# Patient Record
Sex: MALE | Race: ASIAN | Employment: OTHER | URBAN - METROPOLITAN AREA
[De-identification: names, ages, dates, MRNs, and addresses within clinical notes are randomized per-mention and may not be internally consistent; named-entity substitution may affect disease eponyms.]

---

## 2017-02-08 ENCOUNTER — HOSPITAL ENCOUNTER (OUTPATIENT)
Dept: LAB | Age: 67
Discharge: HOME | End: 2017-02-08
Payer: MEDICARE

## 2017-02-08 ENCOUNTER — OFFICE VISIT (OUTPATIENT)
Dept: INTERNAL MEDICINE CLINIC | Age: 67
End: 2017-02-08

## 2017-02-08 VITALS
WEIGHT: 207 LBS | HEART RATE: 75 BPM | DIASTOLIC BLOOD PRESSURE: 73 MMHG | TEMPERATURE: 98.4 F | OXYGEN SATURATION: 98 % | SYSTOLIC BLOOD PRESSURE: 132 MMHG | BODY MASS INDEX: 30.66 KG/M2 | RESPIRATION RATE: 18 BRPM | HEIGHT: 69 IN

## 2017-02-08 DIAGNOSIS — Z91.09 ENVIRONMENTAL ALLERGIES: ICD-10-CM

## 2017-02-08 DIAGNOSIS — E11.9 CONTROLLED TYPE 2 DIABETES MELLITUS WITHOUT COMPLICATION, WITHOUT LONG-TERM CURRENT USE OF INSULIN (HCC): Primary | ICD-10-CM

## 2017-02-08 DIAGNOSIS — G62.9 NEUROPATHY: ICD-10-CM

## 2017-02-08 DIAGNOSIS — R60.9 EDEMA, UNSPECIFIED TYPE: ICD-10-CM

## 2017-02-08 DIAGNOSIS — E11.9 CONTROLLED TYPE 2 DIABETES MELLITUS WITHOUT COMPLICATION, WITHOUT LONG-TERM CURRENT USE OF INSULIN (HCC): ICD-10-CM

## 2017-02-08 DIAGNOSIS — R23.8 SKIN IRRITATION: ICD-10-CM

## 2017-02-08 DIAGNOSIS — I10 ESSENTIAL HYPERTENSION: ICD-10-CM

## 2017-02-08 LAB
ALBUMIN SERPL BCP-MCNC: 3.9 G/DL (ref 3.4–5)
ALBUMIN/GLOB SERPL: 1.3 {RATIO} (ref 0.8–1.7)
ALP SERPL-CCNC: 87 U/L (ref 45–117)
ALT SERPL-CCNC: 31 U/L (ref 16–61)
ANION GAP BLD CALC-SCNC: 10 MMOL/L (ref 3–18)
AST SERPL W P-5'-P-CCNC: 18 U/L (ref 15–37)
BILIRUB SERPL-MCNC: 0.4 MG/DL (ref 0.2–1)
BUN SERPL-MCNC: 18 MG/DL (ref 7–18)
BUN/CREAT SERPL: 25 (ref 12–20)
CALCIUM SERPL-MCNC: 8.6 MG/DL (ref 8.5–10.1)
CHLORIDE SERPL-SCNC: 105 MMOL/L (ref 100–108)
CHOLEST SERPL-MCNC: 177 MG/DL
CO2 SERPL-SCNC: 26 MMOL/L (ref 21–32)
CREAT SERPL-MCNC: 0.72 MG/DL (ref 0.6–1.3)
EST. AVERAGE GLUCOSE BLD GHB EST-MCNC: 134 MG/DL
GLOBULIN SER CALC-MCNC: 3 G/DL (ref 2–4)
GLUCOSE SERPL-MCNC: 81 MG/DL (ref 74–99)
HBA1C MFR BLD: 6.3 % (ref 4.2–5.6)
HDLC SERPL-MCNC: 53 MG/DL (ref 40–60)
HDLC SERPL: 3.3 {RATIO} (ref 0–5)
LDLC SERPL CALC-MCNC: 92.6 MG/DL (ref 0–100)
LIPID PROFILE,FLP: ABNORMAL
POTASSIUM SERPL-SCNC: 3.6 MMOL/L (ref 3.5–5.5)
PROT SERPL-MCNC: 6.9 G/DL (ref 6.4–8.2)
SODIUM SERPL-SCNC: 141 MMOL/L (ref 136–145)
TRIGL SERPL-MCNC: 157 MG/DL (ref ?–150)
VLDLC SERPL CALC-MCNC: 31.4 MG/DL

## 2017-02-08 PROCEDURE — 80061 LIPID PANEL: CPT | Performed by: PHYSICIAN ASSISTANT

## 2017-02-08 PROCEDURE — 36415 COLL VENOUS BLD VENIPUNCTURE: CPT | Performed by: PHYSICIAN ASSISTANT

## 2017-02-08 PROCEDURE — 80053 COMPREHEN METABOLIC PANEL: CPT | Performed by: PHYSICIAN ASSISTANT

## 2017-02-08 PROCEDURE — 83036 HEMOGLOBIN GLYCOSYLATED A1C: CPT | Performed by: PHYSICIAN ASSISTANT

## 2017-02-08 RX ORDER — MINOXIDIL 50 MG/G
AEROSOL, FOAM TOPICAL
Qty: 3 CAN | Refills: 5 | Status: SHIPPED | OUTPATIENT
Start: 2017-02-08 | End: 2017-07-12

## 2017-02-08 RX ORDER — HYDROCHLOROTHIAZIDE 25 MG/1
25 TABLET ORAL DAILY
Qty: 90 TAB | Refills: 1 | Status: SHIPPED | OUTPATIENT
Start: 2017-02-08 | End: 2017-07-12

## 2017-02-08 RX ORDER — HYDROCORTISONE VALERATE 2 MG/G
CREAM TOPICAL 3 TIMES DAILY
Qty: 45 G | Refills: 2 | Status: SHIPPED | OUTPATIENT
Start: 2017-02-08 | End: 2018-01-17 | Stop reason: SDUPTHER

## 2017-02-08 RX ORDER — GABAPENTIN 100 MG/1
100 CAPSULE ORAL 3 TIMES DAILY
Qty: 270 CAP | Refills: 1 | Status: SHIPPED | OUTPATIENT
Start: 2017-02-08 | End: 2018-01-17 | Stop reason: SDUPTHER

## 2017-02-08 RX ORDER — METFORMIN HYDROCHLORIDE 750 MG/1
750 TABLET, EXTENDED RELEASE ORAL DAILY
Qty: 90 TAB | Refills: 1 | Status: SHIPPED | OUTPATIENT
Start: 2017-02-08 | End: 2017-10-12 | Stop reason: SDUPTHER

## 2017-02-08 RX ORDER — CETIRIZINE HCL 10 MG
10 TABLET ORAL DAILY
Qty: 90 TAB | Refills: 1 | Status: SHIPPED | OUTPATIENT
Start: 2017-02-08 | End: 2017-07-10 | Stop reason: SDUPTHER

## 2017-02-08 RX ORDER — NIFEDIPINE 60 MG/1
60 TABLET, EXTENDED RELEASE ORAL DAILY
Qty: 90 TAB | Refills: 1 | Status: SHIPPED | OUTPATIENT
Start: 2017-02-08 | End: 2017-10-12 | Stop reason: SDUPTHER

## 2017-02-08 NOTE — PATIENT INSTRUCTIONS
Noninsulin Medicines for Type 2 Diabetes: Care Instructions  Your Care Instructions  There are different types of noninsulin medicines for diabetes. Each works in a different way to help you control your blood sugar. Some types help your body make insulin to lower your blood sugar. Others lower how much insulin your body needs. Some can slow how quickly your body digests sugars. And some can remove extra glucose through your urine. Some of these medicines are:  · Alpha-glucosidase inhibitors. These keep starches from breaking down. This means that they lower the amount of glucose absorbed when you eat. They do not help your body make more insulin. So they will not cause low blood sugar unless they are used with other medicines for diabetes. They include acarbose and miglitol. · DPP-4 inhibitors. These help your body increase the level of insulin after eating. They also help your body make less of a hormone that raises blood sugar. They include linagliptin, saxagliptin, and sitagliptin. · Incretin hormones (GLP-1 receptor agonists). Your body makes a protein that can raise your insulin level, lower your blood sugar, and make you less hungry. You can inject hormone medicines that work the same way as this protein. They include exenatide and liraglutide. · Meglitinides. These help your body release insulin. They also help slow how your body digests sugars. In this way, they prevent your blood sugar from rising too fast after you eat. They include nateglinide and repaglinide. · Metformin. This lowers how much glucose your liver makes. And it helps you respond better to insulin. It also lowers the amount of stored sugar that your liver releases when you are not eating. · Sodium glucose co-transporter 2 inhibitors (SGLT2 inhibitors). These help to remove extra glucose through your urine. They may also help some people lose weight. They include canagliflozin, dapagliflozin, and empagliflozin. · Sulfonylureas. These help your body release more insulin. Some work for many hours and can cause low blood sugar if you don't eat as you expected. They include glipizide and glyburide. · Thiazolidinediones. These reduce the amount of blood glucose. They also help you respond better to insulin. They include pioglitazone and rosiglitazone. You may need to take more than one medicine for diabetes. Two or more medicines may work better to lower your blood sugar level than just one medicine. Follow-up care is a key part of your treatment and safety. Be sure to make and go to all appointments, and call your doctor if you are having problems. It's also a good idea to know your test results and keep a list of the medicines you take. How can you care for yourself at home? · Eat a healthy diet. Get some exercise each day. This may help you to reduce how much medicine you need. · Do not take other prescription or over-the-counter medicines, vitamins, herbal products, or supplements without talking to your doctor first. Some medicines for type 2 diabetes can cause problems with other medicines or supplements. · Tell your doctor if you plan to get pregnant. Some of these drugs are not safe for pregnant women. · Be safe with medicines. Take your medicines exactly as prescribed. Meglitinides or sulfonylureas can cause your blood sugar to drop very low. Call your doctor if you think you are having a problem with your medicine. · Check your blood sugar levels often. You can use a glucose monitor. Keeping track can help you know how certain foods, activities, and medicines affect your blood sugar. And it can help you keep your blood sugar from getting so low that it's not safe. When should you call for help? Call 911 anytime you think you may need emergency care. For example, call if:  · You passed out (lost consciousness), or you suddenly become very sleepy or confused.  (You may have very low blood sugar.)  · You have symptoms of high blood sugar, such as:  ¨ Blurred vision. ¨ Trouble staying awake or being woken up. ¨ Fast, deep breathing. ¨ Breath that smells fruity. ¨ Belly pain, not feeling hungry, and vomiting. ¨ Feeling confused. Call your doctor now or seek immediate medical care if:  · You are sick and cannot control your blood sugar. · You have been vomiting or have had diarrhea for more than 6 hours. · Your blood sugar stays higher than the level your doctor has set for you. · You have symptoms of low blood sugar, such as:  ¨ Sweating. ¨ Feeling nervous, shaky, and weak. ¨ Extreme hunger and slight nausea. ¨ Dizziness and headache. ¨ Blurred vision. ¨ Confusion. Watch closely for changes in your health, and be sure to contact your doctor if:  · You have a hard time knowing when your blood sugar is low. · You have trouble keeping your blood sugar in the target range. · You often have problems controlling your blood sugar. · You have symptoms of long-term diabetes problems, such as:  ¨ New vision changes. ¨ New pain, numbness, or tingling in your hands or feet. ¨ Skin problems. Where can you learn more? Go to http://shivani-taylor.info/. Enter H153 in the search box to learn more about \"Noninsulin Medicines for Type 2 Diabetes: Care Instructions. \"  Current as of: August 3, 2016  Content Version: 11.1  © 5511-3489 [employer]. Care instructions adapted under license by [employer] (which disclaims liability or warranty for this information). If you have questions about a medical condition or this instruction, always ask your healthcare professional. [place] [address] any warranty or liability for your use of this information.

## 2017-02-08 NOTE — PROGRESS NOTES
HISTORY OF PRESENT ILLNESS  Gretchen Avila is a 73 y.o. male. HPI Mr. Lee Morales is here for follow up on Diabetes and HTN. He feels well. His weight is up a few pounds. He is requesting a [place] for ro[place] He is noticing hair thinning on the top of his head. Current Outpatient Prescriptions   Medication Sig    gabapentin (NEURONTIN) 100 mg capsule Take 1 Cap by mouth three (3) times daily.  metFORMIN ER (GLUCOPHAGE XR) 750 mg tablet Take 1 Tab by mouth daily.  hydrochlorothiazide (HYDRODIURIL) 25 mg tablet Take 1 Tab by mouth daily. Prn swellling    cetirizine (ZYRTEC) 10 mg tablet Take 1 Tab by mouth daily.  tamsulosin (FLOMAX) 0.4 mg capsule Take 1 Cap by mouth daily.  NIFEdipine ER (ADALAT CC) 60 mg ER tablet Take 1 Tab by mouth daily. No current facility-administered medications for this visit. Review of Systems   Constitutional: Negative. HENT: Negative. Eyes: Negative. Respiratory: Negative. Cardiovascular: Positive for leg swelling (occasional). Negative for chest pain. Gastrointestinal: Negative. Neurological: Positive for tingling (secondary to back problems. Takes neurontin with control of sx. ). Negative for dizziness. Psychiatric/Behavioral: Negative. Physical Exam   Constitutional: He is oriented to person, place, and time. He appears well-developed and well-nourished. No distress. HENT:   Head: Normocephalic and atraumatic. Cardiovascular: Normal rate and regular rhythm. No murmur heard. Pulmonary/Chest: Effort normal and breath sounds normal.   Musculoskeletal: He exhibits no edema. Neurological: He is alert and oriented to person, place, and time. Psychiatric: He has a normal mood and affect.  His behavior is normal. Judgment and thought content normal.     Visit Vitals    /73 (BP 1 Location: Left arm, BP Patient Position: Sitting)    Pulse 75    Temp 98.4 °F (36.9 °C) (Oral)  Resp 18    Ht 5' 9\" (1.753 m)    Wt 207 lb (93.9 kg)    SpO2 98%    BMI 30.57 kg/m2        Wt Readings from Last 3 Encounters:   02/08/17 207 lb (93.9 kg)   09/29/16 204 lb (92.5 kg)   08/24/16 204 lb (92.5 kg)       ASSESSMENT and PLAN    ICD-10-CM ICD-9-CM    1. Controlled type 2 diabetes mellitus without complication, without long-term current use of insulin ([facility]) X77.7 417.91 METABOLIC PANEL, COMPREHENSIVE      LIPID PANEL      HEMOGLOBIN A1C WITH EAG      metFORMIN ER (GLUCOPHAGE XR) 750 mg tablet   2. Environmental allergies Z91.09 V15.09 cetirizine (ZYRTEC) 10 mg tablet   3. Skin irritation R23.8 709.9 hydrocortisone valerate (WESTCORT) 0.2 % topical cream   4. Edema, unspecified type R60.9 782.3 hydroCHLOROthiazide (HYDRODIURIL) 25 mg tablet   5. Neuropathy G62.9 355.9 gabapentin (NEURONTIN) 100 mg capsule   6. Essential hypertension I10 401.9 NIFEdipine ER (ADALAT CC) 60 mg ER tablet      hydroCHLOROthiazide (HYDRODIURIL) 25 mg tablet     Pt verbalized understanding of their condition and diagnoses, treatment plan,  as well as side effects of any new medications prescribed.

## 2017-02-08 NOTE — PROGRESS NOTES
Chief Complaint   Patient presents with    Diabetes    Hypertension     1. Have you been to the ER, urgent care clinic since your last visit? Hospitalized since your last visit? No    2. Have you seen or consulted any other health care providers outside of the [address] [place] [address] since your last visit? Include any pap smears or colon screening.  No

## 2017-02-16 RX ORDER — TAMSULOSIN HYDROCHLORIDE 0.4 MG/1
0.4 CAPSULE ORAL DAILY
Qty: 90 CAP | Refills: 1 | Status: SHIPPED | OUTPATIENT
Start: 2017-02-16 | End: 2017-07-10 | Stop reason: SDUPTHER

## 2017-07-12 ENCOUNTER — LAB ONLY (OUTPATIENT)
Dept: INTERNAL MEDICINE CLINIC | Age: 67
End: 2017-07-12

## 2017-07-12 ENCOUNTER — HOSPITAL ENCOUNTER (OUTPATIENT)
Dept: LAB | Age: 67
Discharge: HOME | End: 2017-07-12
Payer: MEDICARE

## 2017-07-12 ENCOUNTER — OFFICE VISIT (OUTPATIENT)
Dept: INTERNAL MEDICINE CLINIC | Age: 67
End: 2017-07-12

## 2017-07-12 VITALS
DIASTOLIC BLOOD PRESSURE: 66 MMHG | RESPIRATION RATE: 18 BRPM | OXYGEN SATURATION: 95 % | HEIGHT: 69 IN | BODY MASS INDEX: 30.81 KG/M2 | WEIGHT: 208 LBS | HEART RATE: 83 BPM | SYSTOLIC BLOOD PRESSURE: 134 MMHG | TEMPERATURE: 98.2 F

## 2017-07-12 DIAGNOSIS — E11.9 TYPE 2 DIABETES MELLITUS WITHOUT COMPLICATION, WITHOUT LONG-TERM CURRENT USE OF INSULIN (HCC): Primary | ICD-10-CM

## 2017-07-12 DIAGNOSIS — Z23 NEED FOR PNEUMOCOCCAL VACCINE: ICD-10-CM

## 2017-07-12 DIAGNOSIS — Z00.00 MEDICARE ANNUAL WELLNESS VISIT, INITIAL: ICD-10-CM

## 2017-07-12 DIAGNOSIS — M25.50 MULTIPLE JOINT PAIN: ICD-10-CM

## 2017-07-12 DIAGNOSIS — I10 ESSENTIAL HYPERTENSION: ICD-10-CM

## 2017-07-12 DIAGNOSIS — Z12.5 SCREENING FOR MALIGNANT NEOPLASM OF PROSTATE: ICD-10-CM

## 2017-07-12 DIAGNOSIS — Z12.5 SCREENING FOR MALIGNANT NEOPLASM OF PROSTATE: Primary | ICD-10-CM

## 2017-07-12 DIAGNOSIS — R60.9 EDEMA, UNSPECIFIED TYPE: ICD-10-CM

## 2017-07-12 DIAGNOSIS — E11.9 TYPE 2 DIABETES MELLITUS WITHOUT COMPLICATION, WITHOUT LONG-TERM CURRENT USE OF INSULIN (HCC): ICD-10-CM

## 2017-07-12 LAB
ALBUMIN SERPL BCP-MCNC: 3.8 G/DL (ref 3.4–5)
ALBUMIN/GLOB SERPL: 1.2 {RATIO} (ref 0.8–1.7)
ALP SERPL-CCNC: 88 U/L (ref 45–117)
ALT SERPL-CCNC: 44 U/L (ref 16–61)
ANION GAP BLD CALC-SCNC: 9 MMOL/L (ref 3–18)
AST SERPL W P-5'-P-CCNC: 25 U/L (ref 15–37)
BILIRUB SERPL-MCNC: 0.6 MG/DL (ref 0.2–1)
BUN SERPL-MCNC: 16 MG/DL (ref 7–18)
BUN/CREAT SERPL: 21 (ref 12–20)
CALCIUM SERPL-MCNC: 8.4 MG/DL (ref 8.5–10.1)
CHLORIDE SERPL-SCNC: 104 MMOL/L (ref 100–108)
CHOLEST SERPL-MCNC: 200 MG/DL
CO2 SERPL-SCNC: 27 MMOL/L (ref 21–32)
CREAT SERPL-MCNC: 0.77 MG/DL (ref 0.6–1.3)
EST. AVERAGE GLUCOSE BLD GHB EST-MCNC: 143 MG/DL
GLOBULIN SER CALC-MCNC: 3.3 G/DL (ref 2–4)
GLUCOSE SERPL-MCNC: 115 MG/DL (ref 74–99)
HBA1C MFR BLD: 6.6 % (ref 4.2–5.6)
HDLC SERPL-MCNC: 58 MG/DL (ref 40–60)
HDLC SERPL: 3.4 {RATIO} (ref 0–5)
LDLC SERPL CALC-MCNC: 120.4 MG/DL (ref 0–100)
LIPID PROFILE,FLP: ABNORMAL
POTASSIUM SERPL-SCNC: 4.1 MMOL/L (ref 3.5–5.5)
PROT SERPL-MCNC: 7.1 G/DL (ref 6.4–8.2)
SODIUM SERPL-SCNC: 140 MMOL/L (ref 136–145)
TRIGL SERPL-MCNC: 108 MG/DL (ref ?–150)
VLDLC SERPL CALC-MCNC: 21.6 MG/DL

## 2017-07-12 PROCEDURE — 83036 HEMOGLOBIN GLYCOSYLATED A1C: CPT | Performed by: PHYSICIAN ASSISTANT

## 2017-07-12 PROCEDURE — 80053 COMPREHEN METABOLIC PANEL: CPT | Performed by: PHYSICIAN ASSISTANT

## 2017-07-12 PROCEDURE — 84153 ASSAY OF PSA TOTAL: CPT | Performed by: PHYSICIAN ASSISTANT

## 2017-07-12 PROCEDURE — 80061 LIPID PANEL: CPT | Performed by: PHYSICIAN ASSISTANT

## 2017-07-12 RX ORDER — CELECOXIB 200 MG/1
200 CAPSULE ORAL DAILY
Qty: 90 CAP | Refills: 3 | Status: SHIPPED | OUTPATIENT
Start: 2017-07-12 | End: 2018-01-18

## 2017-07-12 RX ORDER — CELECOXIB 200 MG/1
200 CAPSULE ORAL DAILY
COMMUNITY
End: 2017-07-12 | Stop reason: SDUPTHER

## 2017-07-12 RX ORDER — HYDROCHLOROTHIAZIDE 25 MG/1
25 TABLET ORAL DAILY
Qty: 90 TAB | Refills: 1 | Status: SHIPPED | OUTPATIENT
Start: 2017-07-12 | End: 2018-01-18

## 2017-07-12 NOTE — PROGRESS NOTES
Chief Complaint   Patient presents with    Diabetes    Hypertension    Annual Wellness Visit     1. Have you been to the ER, urgent care clinic since your last visit? Hospitalized since your last visit? No    2. Have you seen or consulted any other health care providers outside of the [address] [place] [address] [place] since your last visit? Include any pap smears or colon screening.  No    ADL Assessment 7/12/2017   Feeding yourself No Help Needed   Getting from bed to chair No Help Needed   Getting dressed No Help Needed   Bathing or showering No Help Needed   Walk across the room (includes cane/walker) No Help Needed   Using the telphone No Help Needed   Taking your medications No Help Needed   Preparing meals No Help Needed   Managing money (expenses/bills) No Help Needed   Moderately strenuous housework (laundry) No Help Needed   Shopping for personal items (toiletries/medicines) No Help Needed   Shopping for groceries No Help Needed   Driving No Help Needed   Climbing a flight of stairs No Help Needed   Getting to places beyond walking distances No Help Needed

## 2017-07-12 NOTE — PROGRESS NOTES
HISTORY OF PRESENT ILLNESS  Ming eLmus is a 78 y.o. male. HPI Ming Lemus is here for follow up on diabetes, HTN and hypercholesterolemia. He has no new complaints, although he states his back and knees hurt him. He admits he doesn't check his sugar. He denies any sx of low or high blood sugar. He was in earlier and had labs drawn. Review of Systems   Constitutional: Negative. Eyes: Negative for blurred vision. Respiratory: Negative. Cardiovascular: Positive for leg swelling (has not been taking HCTZ). Neurological: Positive for tingling (neuropathy secondary to back issues). Negative for dizziness and headaches. Psychiatric/Behavioral: Negative. Physical Exam   Constitutional: He is oriented to person, place, and time. He appears well-developed and well-nourished. No distress. Cardiovascular: Normal rate and regular rhythm. No murmur heard. Pulmonary/Chest: Effort normal and breath sounds normal. He has no wheezes. Musculoskeletal: Normal range of motion. He exhibits edema (encouraged elevation of feet, compression stockings, use of HCTZ with proper hydration). Neurological: He is alert and oriented to person, place, and time. Psychiatric: He has a normal mood and affect. His behavior is normal. Judgment and thought content normal.     Visit Vitals    /66 (BP 1 Location: Left arm, BP Patient Position: Sitting)    Pulse 83    Temp 98.2 °F (36.8 °C) (Oral)    Resp 18    Ht 5' 9\" (1.753 m)    Wt 208 lb (94.3 kg)    SpO2 95%    BMI 30.72 kg/m2       Marvin was seen today for diabetes, hypertension and annual wellness visit. Diagnoses and all orders for this visit:    Type 2 diabetes mellitus without complication, without long-term current use of insulin (HCC)    Multiple joint pain  -     celecoxib (CELEBREX) 200 mg capsule; Take 1 Cap by mouth daily. Edema, unspecified type  -     hydroCHLOROthiazide (HYDRODIURIL) 25 mg tablet;  Take 1 Tab by mouth daily. Prn swellling   Some swelling may be due to the calcium channel blocker and neurontin also. Essential hypertension  -     hydroCHLOROthiazide (HYDRODIURIL) 25 mg tablet; Take 1 Tab by mouth daily. Prn swellling    Need for pneumococcal vaccine  -     pneumococcal 23-valent (PNEUMOVAX 23) 25 mcg/0.5 mL injection; 0.5 mL by IntraMUSCular route once for 1 dose. [place] Pt verbalized understanding of their condition and diagnoses, treatment plan,  as well as side effects of any new medications prescribed.

## 2017-07-12 NOTE — PATIENT INSTRUCTIONS
Schedule of Personalized Health Plan  (Provide Copy to Patient)  The best way to stay healthy is to live a healthy lifestyle. A healthy lifestyle includes regular exercise, eating a well-balanced diet, keeping a healthy weight and not smoking. Regular physical exams and screening tests are another important way to take care of yourself. Preventive exams provided by health care providers can find health problems early when treatment works best and can keep you from getting certain diseases or illnesses. Preventive services include exams, lab tests, screenings, shots, monitoring and information to help you take care of your own health. All people over 65 should have a pneumonia shot. Pneumonia shots are usually only needed once in a lifetime unless your doctor decides differently. All people over 65 should have a yearly flu shot. People over 65 are at medium to high risk for Hepatitis B. Three shots are needed for complete protection. In addition to your physical exam, some screening tests are recommended:    Bone mass measurement (dexa scan) is recommended every two years if you have certain risk factors, such as personal history of vertebral fracture or chronic steroid medication use    Diabetes Mellitus screening is recommended every year. Glaucoma is an eye disease caused by high pressure in the eye. An eye exam is recommended every year. Cardiovascular screening tests that check your cholesterol and other blood fat (lipid) levels are recommended every five years. Colorectal Cancer screening tests help to find pre-cancerous polyps (growths in the colon) so they can be removed before they turn into cancer. Tests ordered for screening depend on your personal and family history risk factors.     Screening for Prostate Cancer is recommended yearly with a digital rectal exam and/or a PSA test    Here is a list of your current Health Maintenance items with a due date:  Health Maintenance Topic Date Due    EYE EXAM RETINAL OR DILATED Q1  08/17/2017 (Originally 6/6/1960)    INFLUENZA AGE 9 TO ADULT  08/01/2017    Pneumococcal 65+ Low/Medium Risk (2 of 2 - PPSV23) 08/24/2017    MICROALBUMIN Q1  08/24/2017    HEMOGLOBIN A1C Q6M  01/12/2018    FOOT EXAM Q1  02/08/2018    LIPID PANEL Q1  02/08/2018    GLAUCOMA SCREENING Q2Y  07/12/2018    MEDICARE YEARLY EXAM  07/13/2018    COLONOSCOPY  08/27/2025    DTaP/Tdap/Td series (3 - Td) 08/24/2026    Hepatitis C Screening  Completed    ZOSTER VACCINE AGE 60>  Completed

## 2017-07-12 NOTE — PROGRESS NOTES
Ernie Avalos is a 78 y.o. male and presents for annual Medicare Wellness Visit. Problem List: Reviewed with patient and discussed risk factors. Patient Active Problem List   Diagnosis Code    Advance directive discussed with patient Z70.80    Type 2 diabetes mellitus without complication, without long-term current use of insulin ([facility] [address] E11.9       Current medical providers:  Patient Care Team:  Laurina Alpers, PA-C as PCP - General (Family Practice)  Adriano Navarrete MD as Physician (Colon and Rectal Surgery)    PSH: Reviewed with patient  Past Surgical History:   Procedure Laterality Date    COLONOSCOPY  10/13/2015 Return 10/14/2025    Dr. Simeon Blair; screening    HX KNEE ARTHROSCOPY      right knee        SH: Reviewed with patient  Social History   Substance Use Topics    Smoking status: Never Smoker    Smokeless tobacco: Never Used    Alcohol use 1.0 oz/week     2 Cans of beer per week       FH: Reviewed with patient  Family History   Problem Relation Age of Onset    No Known Problems Mother     No Known Problems Father        Medications/Allergies: Reviewed with patient  Current Outpatient Prescriptions on File Prior to Visit   Medication Sig Dispense Refill    cetirizine (ZYRTEC) 10 mg tablet Take 1 Tab by mouth daily. 90 Tab 1    tamsulosin (FLOMAX) 0.4 mg capsule Take 1 Cap by mouth daily. 90 Cap 1    NIFEdipine ER (ADALAT CC) 60 mg ER tablet Take 1 Tab by mouth daily. 90 Tab 1    hydrocortisone valerate (WESTCORT) 0.2 % topical cream Apply  to affected area three (3) times daily. use thin layer 45 g 2    gabapentin (NEURONTIN) 100 mg capsule Take 1 Cap by mouth three (3) times daily. 270 Cap 1    metFORMIN ER (GLUCOPHAGE XR) 750 mg tablet Take 1 Tab by mouth daily. 90 Tab 1     No current facility-administered medications on file prior to visit.        No Known Allergies    Objective:  Visit Vitals    /66 (BP 1 Location: Left arm, BP Patient Position: Sitting)    Pulse 83    Temp 98.2 °F (36.8 °C) (Oral)    Resp 18    Ht 5' 9\" (1.753 m)    Wt 208 lb (94.3 kg)    SpO2 95%    BMI 30.72 kg/m2    Body mass index is 30.72 kg/(m^2). Assessment of cognitive impairment: Alert and oriented x 3    Depression Screen:   PHQ over the last two weeks 7/12/2017   Little interest or pleasure in doing things Not at all   Feeling down, depressed or hopeless Not at all   Total Score PHQ 2 0       Fall Risk Assessment:    Fall Risk Assessment, last 12 mths 7/12/2017   Able to walk? Yes   Fall in past 12 months? No       Functional Ability:   Does the patient exhibit a steady gait? yes   How long did it take the patient to get up and walk from a sitting position? <10 seconds   Is the patient self reliant?  (ie can do own laundry, meals, household chores)  yes     Does the patient handle his/her own medications? yes     Does the patient handle his/her own money? yes     Is the patients home safe (ie good lighting, handrails on stairs and bath, etc.)? yes     Did you notice or did patient express any hearing difficulties? no     Did you notice or did patient express any vision difficulties?   no     Were distance and reading eye charts used? yes       Advance Care Planning:   Patient was offered the opportunity to discuss advance care planning:  yes     Does patient have an Advance Directive:  no   If no, did you provide information on Caring Connections?   yes       Plan:      Orders Placed This Encounter    DISCONTD: celecoxib (CELEBREX) 200 mg capsule    celecoxib (CELEBREX) 200 mg capsule    hydroCHLOROthiazide (HYDRODIURIL) 25 mg tablet    pneumococcal 23-valent (PNEUMOVAX 23) 25 mcg/0.5 mL injection       Health Maintenance   Topic Date Due    EYE EXAM RETINAL OR DILATED Q1  08/17/2017 (Originally 6/6/1960)    INFLUENZA AGE 9 TO ADULT  08/01/2017    Pneumococcal 65+ Low/Medium Risk (2 of 2 - PPSV23) 08/24/2017    MICROALBUMIN Q1  08/24/2017    HEMOGLOBIN A1C Q6M  01/12/2018    FOOT EXAM Q1  02/08/2018    LIPID PANEL Q1  02/08/2018    GLAUCOMA SCREENING Q2Y  07/12/2018    MEDICARE YEARLY EXAM  07/13/2018    COLONOSCOPY  08/27/2025    DTaP/Tdap/Td series (3 - Td) 08/24/2026    Hepatitis C Screening  Completed    ZOSTER VACCINE AGE 60>  Completed       *Patient verbalized understanding and agreement with the plan. A copy of the After Visit Summary with personalized health plan was given to the patient today.

## 2017-07-13 LAB — PSA SERPL-MCNC: 1 NG/ML (ref 0–4)

## 2017-08-17 ENCOUNTER — OFFICE VISIT (OUTPATIENT)
Dept: INTERNAL MEDICINE CLINIC | Age: 67
End: 2017-08-17

## 2017-08-17 ENCOUNTER — TELEPHONE (OUTPATIENT)
Dept: INTERNAL MEDICINE CLINIC | Age: 67
End: 2017-08-17

## 2017-08-17 VITALS
OXYGEN SATURATION: 95 % | TEMPERATURE: 98.7 F | RESPIRATION RATE: 18 BRPM | SYSTOLIC BLOOD PRESSURE: 127 MMHG | HEIGHT: 69 IN | WEIGHT: 201 LBS | HEART RATE: 72 BPM | BODY MASS INDEX: 29.77 KG/M2 | DIASTOLIC BLOOD PRESSURE: 80 MMHG

## 2017-08-17 DIAGNOSIS — L21.9 SEBORRHEIC DERMATITIS OF SCALP: Primary | ICD-10-CM

## 2017-08-17 DIAGNOSIS — R05.9 COUGH: ICD-10-CM

## 2017-08-17 RX ORDER — ALBUTEROL SULFATE 90 UG/1
2 AEROSOL, METERED RESPIRATORY (INHALATION)
Qty: 3 INHALER | Refills: 2 | Status: SHIPPED | OUTPATIENT
Start: 2017-08-17 | End: 2018-01-17 | Stop reason: SDUPTHER

## 2017-08-17 RX ORDER — HYDROCODONE POLISTIREX AND CHLORPHENIRAMINE POLISTIREX 10; 8 MG/5ML; MG/5ML
1 SUSPENSION, EXTENDED RELEASE ORAL
Qty: 115 ML | Refills: 0 | Status: SHIPPED | OUTPATIENT
Start: 2017-08-17 | End: 2017-09-05

## 2017-08-17 RX ORDER — LEVOFLOXACIN 500 MG/1
500 TABLET, FILM COATED ORAL DAILY
Qty: 10 TAB | Refills: 0 | Status: SHIPPED | OUTPATIENT
Start: 2017-08-17 | End: 2017-08-27

## 2017-08-17 RX ORDER — PREDNISONE 20 MG/1
20 TABLET ORAL 2 TIMES DAILY
Qty: 10 TAB | Refills: 0 | Status: SHIPPED | OUTPATIENT
Start: 2017-08-17 | End: 2017-08-22

## 2017-08-17 RX ORDER — KETOCONAZOLE 20 MG/ML
SHAMPOO TOPICAL
Qty: 120 ML | Refills: 2 | Status: SHIPPED | OUTPATIENT
Start: 2017-08-17 | End: 2018-01-17 | Stop reason: SDUPTHER

## 2017-08-17 NOTE — TELEPHONE ENCOUNTER
His cxr shows possibility of reactive airway disease, bronchitis or viral infection. The medication I gave today, particularly the steroids should help him. He should follow up if he doesn't get better.

## 2017-08-17 NOTE — PATIENT INSTRUCTIONS
Cough: Care Instructions  Your Care Instructions  A cough is your body's response to something that bothers your throat or airways. Many things can cause a cough. You might cough because of a cold or the flu, bronchitis, or asthma. Smoking, postnasal drip, allergies, and stomach acid that backs up into your throat also can cause coughs. A cough is a symptom, not a disease. Most coughs stop when the cause, such as a cold, goes away. You can take a few steps at home to cough less and feel better. Follow-up care is a key part of your treatment and safety. Be sure to make and go to all appointments, and call your doctor if you are having problems. It's also a good idea to know your test results and keep a list of the medicines you take. How can you care for yourself at home? · Drink lots of water and other fluids. This helps thin the mucus and soothes a dry or sore throat. Honey or lemon juice in hot water or tea may ease a dry cough. · Take cough medicine as directed by your doctor. · Prop up your head on pillows to help you breathe and ease a dry cough. · Try cough drops to soothe a dry or sore throat. Cough drops don't stop a cough. Medicine-flavored cough drops are no better than candy-flavored drops or hard candy. · Do not smoke. Avoid secondhand smoke. If you need help quitting, talk to your doctor about stop-smoking programs and medicines. These can increase your chances of quitting for good. When should you call for help? Call 911 anytime you think you may need emergency care. For example, call if:  · You have severe trouble breathing. Call your doctor now or seek immediate medical care if:  · You cough up blood. · You have new or worse trouble breathing. · You have a new or higher fever. · You have a new rash.   Watch closely for changes in your health, and be sure to contact your doctor if:  · You cough more deeply or more often, especially if you notice more mucus or a change in the color of your mucus. · You have new symptoms, such as a sore throat, an earache, or sinus pain. · You do not get better as expected. Where can you learn more? Go to http://shivani-taylor.info/. Enter D279 in the search box to learn more about \"Cough: Care Instructions. \"  Current as of: March 25, 2017  Content Version: 11.3  © 8665-2029 [employer]. Care instructions adapted under license by [employer] (which disclaims liability or warranty for this information). If you have questions about a medical condition or this instruction, always ask your healthcare professional. [address] any warranty or liability for your use of this information.

## 2017-08-17 NOTE — PROGRESS NOTES
HISTORY OF PRESENT ILLNESS  Jayant Marrero is a 78 y.o. male. HPI  Mr. Marilia Butler is here after completing a course of zithromax from an urgent care 5 days ago. He c/o wheezing and cough. He was also given albuterol inhaler and tessalon. He did not feel any improvement with the antibiotics. He denies fever, body aches, headache, nausea or vomiting. Review of Systems   HENT: Positive for congestion. Negative for sore throat. Respiratory: Positive for cough, sputum production and wheezing. Neurological: Negative for dizziness. Physical Exam   Constitutional: He is oriented to person, place, and time. He appears well-developed and well-nourished. No distress. HENT:   Head: Normocephalic and atraumatic. Mouth/Throat: No posterior oropharyngeal erythema. Eyes: Conjunctivae are normal.   Cardiovascular: Normal rate and regular rhythm. Pulmonary/Chest: Effort normal. He has wheezes (few). Neurological: He is alert and oriented to person, place, and time. Visit Vitals    /80 (BP 1 Location: Left arm, BP Patient Position: Sitting)    Pulse 72    Temp 98.7 °F (37.1 °C) (Oral)    Resp 18    Ht 5' 9\" (1.753 m)    Wt 201 lb (91.2 kg)    SpO2 95%    BMI 29.68 kg/m2       ASSESSMENT and PLAN    ICD-10-CM ICD-9-CM    1. Seborrheic dermatitis of scalp L21.9 690.18 predniSONE (DELTASONE) 20 mg tablet   2. Cough R05 786.2 XR CHEST PA LAT      levoFLOXacin (LEVAQUIN) 500 mg tablet      chlorpheniramine-HYDROcodone (TUSSIONEX) 10-8 mg/5 mL suspension      albuterol (PROVENTIL HFA, VENTOLIN HFA, PROAIR HFA) 90 mcg/actuation inhaler     Pt verbalized understanding of their condition and diagnoses, treatment plan,  as well as side effects of any new medications prescribed.

## 2017-08-17 NOTE — PROGRESS NOTES
Chief Complaint   Patient presents with    Cough     Pt has also been to [facility] and was prescribed anx which has not worked. Completed zpack, inhaler and cough meds x 4 days. 1. Have you been to the ER, urgent care clinic since your last visit? Hospitalized since your last visit? Yes Where: [facility]    2. Have you seen or consulted any other health care providers outside of the [address] [place] [address] since your last visit? Include any pap smears or colon screening.  No

## 2017-08-21 NOTE — TELEPHONE ENCOUNTER
Pt aware of below message aware to call and schedule another appt if he is not feeling any better or the cough does not go away.         Closing encounter

## 2017-09-05 ENCOUNTER — OFFICE VISIT (OUTPATIENT)
Dept: INTERNAL MEDICINE CLINIC | Age: 67
End: 2017-09-05

## 2017-09-05 ENCOUNTER — TELEPHONE (OUTPATIENT)
Dept: INTERNAL MEDICINE CLINIC | Age: 67
End: 2017-09-05

## 2017-09-05 VITALS
TEMPERATURE: 97.7 F | HEIGHT: 69 IN | SYSTOLIC BLOOD PRESSURE: 123 MMHG | BODY MASS INDEX: 29.62 KG/M2 | DIASTOLIC BLOOD PRESSURE: 75 MMHG | RESPIRATION RATE: 18 BRPM | WEIGHT: 200 LBS | HEART RATE: 77 BPM | OXYGEN SATURATION: 95 %

## 2017-09-05 DIAGNOSIS — R05.9 COUGH: Primary | ICD-10-CM

## 2017-09-05 DIAGNOSIS — R07.9 CHEST PAIN, UNSPECIFIED TYPE: ICD-10-CM

## 2017-09-05 DIAGNOSIS — J45.909 REACTIVE AIRWAY DISEASE, UNSPECIFIED ASTHMA SEVERITY, UNCOMPLICATED: ICD-10-CM

## 2017-09-05 DIAGNOSIS — R06.09 DOE (DYSPNEA ON EXERTION): ICD-10-CM

## 2017-09-05 DIAGNOSIS — Z23 ENCOUNTER FOR IMMUNIZATION: ICD-10-CM

## 2017-09-05 DIAGNOSIS — J40 BRONCHITIS: ICD-10-CM

## 2017-09-05 RX ORDER — FLUTICASONE PROPIONATE AND SALMETEROL 250; 50 UG/1; UG/1
1 POWDER RESPIRATORY (INHALATION) EVERY 12 HOURS
Qty: 1 INHALER | Refills: 2 | Status: SHIPPED | OUTPATIENT
Start: 2017-09-05 | End: 2018-04-12 | Stop reason: SDUPTHER

## 2017-09-05 RX ORDER — PREDNISONE 20 MG/1
TABLET ORAL
Qty: 10 TAB | Refills: 0 | Status: SHIPPED | OUTPATIENT
Start: 2017-09-05 | End: 2018-01-18

## 2017-09-05 RX ORDER — AMOXICILLIN AND CLAVULANATE POTASSIUM 875; 125 MG/1; MG/1
1 TABLET, FILM COATED ORAL 2 TIMES DAILY
Qty: 20 TAB | Refills: 0 | Status: SHIPPED | OUTPATIENT
Start: 2017-09-05 | End: 2017-09-15

## 2017-09-05 NOTE — MR AVS SNAPSHOT
Visit Information Date & Time Provider Department Dept. Phone Encounter #  
 9/5/2017 11:15 AM Luis Alberto Powell PA-C [facility] [phone] Upcoming Health Maintenance Date Due  
 EYE EXAM RETINAL OR DILATED Q1 2/28/2018* HEMOGLOBIN A1C Q6M 1/12/2018 FOOT EXAM Q1 2/8/2018 GLAUCOMA SCREENING Q2Y 7/12/2018 LIPID PANEL Q1 7/12/2018 MEDICARE YEARLY EXAM 7/13/2018 MICROALBUMIN Q1 9/5/2018 COLONOSCOPY 8/27/2025 DTaP/Tdap/Td series (3 - Td) 8/24/2026 *Topic was postponed. The date shown is not the original due date. Allergies as of 9/5/2017  Review Complete On: 9/5/2017 By: Palak Barker LPN No Known Allergies Current Immunizations  Never Reviewed Name Date Influenza High Dose Vaccine PF  Incomplete Influenza Vaccine (Quad) PF 9/14/2016 Pneumococcal Conjugate (PCV-13) 8/24/2016 Tdap 2/18/2013 Not reviewed this visit You Were Diagnosed With   
  
 Codes Comments Cough    -  Primary ICD-10-CM: K49 ICD-9-CM: 786.2 REARDON (dyspnea on exertion)     ICD-10-CM: R06.09 
ICD-9-CM: 786.09 Encounter for immunization     ICD-10-CM: J26 ICD-9-CM: V03.89 Chest pain, unspecified type     ICD-10-CM: R07.9 ICD-9-CM: 786.50 Bronchitis     ICD-10-CM: J40 ICD-9-CM: 187 Reactive airway disease, unspecified asthma severity, uncomplicated     H-73-ZX: J45.909 ICD-9-CM: 493.90 Vitals BP Pulse Temp Resp Height(growth percentile) Weight(growth percentile) 123/75 (BP 1 Location: Left arm, BP Patient Position: Sitting) 77 97.7 °F (36.5 °C) (Oral) 18 5' 9\" (1.753 m) 200 lb (90.7 kg) SpO2 BMI Smoking Status 95% 29.53 kg/m2 Never Smoker BMI and BSA Data Body Mass Index Body Surface Area  
 29.53 kg/m 2 2.1 m 2 Preferred Pharmacy Pharmacy Name Phone [address], [place] [phone] Your Updated Medication List  
  
   
This list is accurate as of: 9/5/17  1:00 PM.  Always use your most recent med list.  
  
  
  
  
 albuterol 90 mcg/actuation inhaler Commonly known as:  PROVENTIL HFA, VENTOLIN HFA, PROAIR HFA Take 2 Puffs by inhalation every six (6) hours as needed for Wheezing. amoxicillin-clavulanate 875-125 mg per tablet Commonly known as:  AUGMENTIN Take 1 Tab by mouth two (2) times a day for 10 days. celecoxib 200 mg capsule Commonly known as:  CeleBREX Take 1 Cap by mouth daily. cetirizine 10 mg tablet Commonly known as:  ZYRTEC Take 1 Tab by mouth daily. chlorpheniramine-HYDROcodone 10-8 mg/5 mL suspension Commonly known as:  Marijane Shady Take 5 mL by mouth every twelve (12) hours as needed for Cough. Max Daily Amount: 10 mL. fluticasone-salmeterol 250-50 mcg/dose diskus inhaler Commonly known as:  ADVAIR Take 1 Puff by inhalation every twelve (12) hours. gabapentin 100 mg capsule Commonly known as:  NEURONTIN Take 1 Cap by mouth three (3) times daily. hydroCHLOROthiazide 25 mg tablet Commonly known as:  HYDRODIURIL Take 1 Tab by mouth daily. Prn swellling  
  
 hydrocortisone valerate 0.2 % topical cream  
Commonly known as:  [employer] Apply  to affected area three (3) times daily. use thin layer  
  
 ketoconazole 2 % shampoo Commonly known as:  NIZORAL Shampoo daily, lather and let sit for 5 minutes, then rinse  
  
 metFORMIN  mg tablet Commonly known as:  GLUCOPHAGE XR Take 1 Tab by mouth daily. NIFEdipine ER 60 mg ER tablet Commonly known as:  ADALAT CC Take 1 Tab by mouth daily. predniSONE 20 mg tablet Commonly known as:  Fany Look Take 2 tabs by mouth once daily  
  
 tamsulosin 0.4 mg capsule Commonly known as:  FLOMAX Take 1 Cap by mouth daily. Prescriptions Sent to Pharmacy  Refills  
 amoxicillin-clavulanate (AUGMENTIN) 875-125 mg per tablet 0  
 Sig: Take 1 Tab by mouth two (2) times a day for 10 days. Class: Normal  
 Pharmacy: [address], [place] Ph #: [phone] Route: Oral  
 predniSONE (DELTASONE) 20 mg tablet 0 Sig: Take 2 tabs by mouth once daily Class: Normal  
 Pharmacy: [facility] [address] [address], [place] Ph #: [phone]  
 fluticasone-salmeterol (ADVAIR) 250-50 mcg/dose diskus inhaler 2 Sig: Take 1 Puff by inhalation every twelve (12) hours. Class: Normal  
 Pharmacy: [address], [place] Ph #: [phone] Route: Inhalation We Performed the Following ADMIN INFLUENZA VIRUS VAC [ HCPCS] AMB POC EKG ROUTINE W/ 12 LEADS, INTER & REP [48048 CPT(R)] INFLUENZA VIRUS VACCINE, HIGH DOSE SEASONAL, PRESERVATIVE FREE [31077 CPT(R)] To-Do List   
 09/05/2017 ECHO:  ECHO TTE STRESS EXERCISE TREADMILL COMP   
  
 09/05/2017 ECHO:  ECHO TTE STRESS EXRCSE COMP W OR WO CONTR   
  
 09/05/2017 Imaging:  XR CHEST PA LAT Patient Instructions Influenza Virus Vaccine (By injection) Influenza Virus Vaccine (in-floo-EN-za VYE-bushra VAX-een) Helps prevent infection with influenza (flu) virus. Brand Name(s): Afluria 4772-2801 Formula, Valerie Paige 7732-0713 Formula, FluLaval Quadrivalent 1234-7088 Formula, Fluad 5676-2385 Formula, Fluarix Quadrivalent 5238-5698 Formula, Flublok 9173-1804 Formula, Flucelvax Quadrivalent 9789-4452 Formula, Fluvirin 0405-8426 Formula, Fluzone High-Dose 0432-8587 Formula, Fluzone Intradermal Quadrivalent 0794-3240 Formula, Fluzone Quadrivalent 6625-8460 Formula, Medical Provider Single Use EZ Flu Shot 8692-7903, Medical Provider Single Use EZ Flu Shot 2442-3253 There may be other brand names for this medicine. When This Medicine Should Not Be Used: This vaccine is not right for everyone. You should not receive it if you had an allergic reaction to flu vaccine. If you are allergic to eggs, tell the caregiver who is going to give you the injection. Some brands of this vaccine contain egg proteins and could cause an allergic reaction. How to Use This Medicine:  
Injectable · The vaccine is given as a shot into a muscle or into your skin, usually in the shoulder area. The shot could be given in the thigh for babies and young children. · A nurse or other health provider will give you this medicine. · Read and follow the patient instructions that come with this medicine. Talk to your doctor or pharmacist if you have any questions. · A child who is younger than 5years old and who has not had a flu shot before may need 2 shots. The second shot should be given about 1 month after the first. 
· Missed dose: Most people need only 1 dose of the vaccine. If your child needs a second dose, it is important for the vaccine to be given on schedule. If you must cancel an appointment, make a new one right away. Drugs and Foods to Avoid: Ask your doctor or pharmacist before using any other medicine, including over-the-counter medicines, vitamins, and herbal products. · Tell your doctor if you are using a medicine or treatment that weakens your immune system, such as a steroid, radiation, or cancer treatment. This vaccine may not work as well if you are also using these medicines. However, your doctor may still want you to get the vaccine because it can give you some protection. Warnings While Using This Medicine: · Tell your doctor if you are pregnant or breastfeeding or if you have a weak immune system. · Tell your doctor if you ever had an unusual reaction to a flu shot, such as Guillain-Barré syndrome, or if you are allergic to latex. · The flu vaccine may not protect everyone who receives it.  This vaccine will not treat flu symptoms if you have already been infected with the virus. Possible Side Effects While Using This Medicine:  
Call your doctor right away if you notice any of these side effects: · Allergic reaction: Itching or hives, swelling in your face or hands, swelling or tingling in your mouth or throat, chest tightness, trouble breathing · Fainting, dizziness, or lightheadedness · Fever over 103 degrees F 
· Seizures · Severe muscle weakness If you notice these less serious side effects, talk with your doctor:  
· Headache, muscle pain, tiredness · Irritability or crying (in a child) · Redness, pain, swelling, soreness, or a lump where the shot was given If you notice other side effects that you think are caused by this medicine, tell your doctor. Call your doctor for medical advice about side effects. You may report side effects to FDA at 2-606-FDA-5857 © 2017 [facility] Information is for End User's use only and may not be sold, redistributed or otherwise used for commercial purposes. The above information is an  only. It is not intended as medical advice for individual conditions or treatments. Talk to your doctor, nurse or pharmacist before following any medical regimen to see if it is safe and effective for you. Introducing [facility]! Dear Talon Engle: Thank you for requesting a [employer] account. Our records indicate that you already have an active [employer] account. You can access your account anytime at [email or web address] Did you know that you can access your hospital and ER discharge instructions at any time in [employer]? You can also review all of your test results from your hospital stay or ER visit. Additional Information If you have questions, please visit the Frequently Asked Questions section of the [employer] website at [email or web address] Remember, MyChart is NOT to be used for urgent needs. For medical emergencies, dial 911. Now available from your iPhone and Android! Please provide this summary of care documentation to your next provider. Your primary care clinician is listed as Clari Walter. If you have any questions after today's visit, please call [phone].

## 2017-09-05 NOTE — PATIENT INSTRUCTIONS
Influenza Virus Vaccine (By injection)   Influenza Virus Vaccine (in-floo-EN-za VYE-bushra VAX-een)  Helps prevent infection with influenza (flu) virus. Brand Name(s): Afluria 7660-1560 Formula, Elvin Toribio 8856-3172 Formula, FluLaval Quadrivalent 7305-2453 Formula, Fluad 3712-5769 Formula, Fluarix Quadrivalent 0915-9881 Formula, Flublok 0934-8270 Formula, Flucelvax Quadrivalent 7616-5633 Formula, Fluvirin 2135-0774 Formula, Fluzone High-Dose 7141-1727 Formula, Fluzone Intradermal Quadrivalent 3198-4471 Formula, Fluzone Quadrivalent 8900-0076 Formula, Medical Provider Single Use EZ Flu Shot 8762-1025, Medical Provider Single Use EZ Flu Shot 7394-3578   There may be other brand names for this medicine. When This Medicine Should Not Be Used: This vaccine is not right for everyone. You should not receive it if you had an allergic reaction to flu vaccine. If you are allergic to eggs, tell the caregiver who is going to give you the injection. Some brands of this vaccine contain egg proteins and could cause an allergic reaction. How to Use This Medicine:   Injectable  · The vaccine is given as a shot into a muscle or into your skin, usually in the shoulder area. The shot could be given in the thigh for babies and young children. · A nurse or other health provider will give you this medicine. · Read and follow the patient instructions that come with this medicine. Talk to your doctor or pharmacist if you have any questions. · A child who is younger than 5years old and who has not had a flu shot before may need 2 shots. The second shot should be given about 1 month after the first.  · Missed dose: Most people need only 1 dose of the vaccine. If your child needs a second dose, it is important for the vaccine to be given on schedule. If you must cancel an appointment, make a new one right away.   Drugs and Foods to Avoid:   Ask your doctor or pharmacist before using any other medicine, including over-the-counter medicines, vitamins, and herbal products. · Tell your doctor if you are using a medicine or treatment that weakens your immune system, such as a steroid, radiation, or cancer treatment. This vaccine may not work as well if you are also using these medicines. However, your doctor may still want you to get the vaccine because it can give you some protection. Warnings While Using This Medicine:   · Tell your doctor if you are pregnant or breastfeeding or if you have a weak immune system. · Tell your doctor if you ever had an unusual reaction to a flu shot, such as Guillain-Barré syndrome, or if you are allergic to latex. · The flu vaccine may not protect everyone who receives it. This vaccine will not treat flu symptoms if you have already been infected with the virus. Possible Side Effects While Using This Medicine:   Call your doctor right away if you notice any of these side effects:  · Allergic reaction: Itching or hives, swelling in your face or hands, swelling or tingling in your mouth or throat, chest tightness, trouble breathing  · Fainting, dizziness, or lightheadedness  · Fever over 103 degrees F  · Seizures  · Severe muscle weakness  If you notice these less serious side effects, talk with your doctor:   · Headache, muscle pain, tiredness  · Irritability or crying (in a child)  · Redness, pain, swelling, soreness, or a lump where the shot was given  If you notice other side effects that you think are caused by this medicine, tell your doctor. Call your doctor for medical advice about side effects. You may report side effects to FDA at 3-603-FDA-1545  © 2017 [employer] [employer] Information is for End User's use only and may not be sold, redistributed or otherwise used for commercial purposes. The above information is an  only. It is not intended as medical advice for individual conditions or treatments.  Talk to your doctor, nurse or pharmacist before following any medical regimen to see if it is safe and effective for you.

## 2017-09-05 NOTE — PROGRESS NOTES
HISTORY OF PRESENT ILLNESS  Ursula Mendez is a 78 y.o. male. HPI   Pt is here for cough, SOB, and wheezing. Pt was seen last month for similar symptoms and was given levaquin and prednisone. Pt states that when he lays down his chest feels heavy and he hurts. He feels SOB nicole w exertion. Denies fever, chills, ST, edema, ST, otalgia, dizziness, N/V/D, palpitations, and  HA. Pt also would like his flu shot today. No Known Allergies    Current Outpatient Prescriptions   Medication Sig    amoxicillin-clavulanate (AUGMENTIN) 875-125 mg per tablet Take 1 Tab by mouth two (2) times a day for 10 days.  predniSONE (DELTASONE) 20 mg tablet Take 2 tabs by mouth once daily    fluticasone-salmeterol (ADVAIR) 250-50 mcg/dose diskus inhaler Take 1 Puff by inhalation every twelve (12) hours.  ketoconazole (NIZORAL) 2 % shampoo Shampoo daily, lather and let sit for 5 minutes, then rinse    albuterol (PROVENTIL HFA, VENTOLIN HFA, PROAIR HFA) 90 mcg/actuation inhaler Take 2 Puffs by inhalation every six (6) hours as needed for Wheezing.  cetirizine (ZYRTEC) 10 mg tablet Take 1 Tab by mouth daily.  tamsulosin (FLOMAX) 0.4 mg capsule Take 1 Cap by mouth daily.  celecoxib (CELEBREX) 200 mg capsule Take 1 Cap by mouth daily.  hydroCHLOROthiazide (HYDRODIURIL) 25 mg tablet Take 1 Tab by mouth daily. Prn swellling    NIFEdipine ER (ADALAT CC) 60 mg ER tablet Take 1 Tab by mouth daily.  hydrocortisone valerate (WESTCORT) 0.2 % topical cream Apply  to affected area three (3) times daily. use thin layer    gabapentin (NEURONTIN) 100 mg capsule Take 1 Cap by mouth three (3) times daily.  metFORMIN ER (GLUCOPHAGE XR) 750 mg tablet Take 1 Tab by mouth daily. No current facility-administered medications for this visit. Review of Systems   Constitutional: Positive for malaise/fatigue. Negative for chills and fever. HENT: Negative.   Negative for congestion, ear pain, sinus pain, sore throat and tinnitus. Eyes: Negative. Negative for blurred vision, double vision and photophobia. Respiratory: Positive for cough, sputum production, shortness of breath and wheezing. Negative for hemoptysis. Cardiovascular: Positive for chest pain (when lying down at night). Negative for palpitations and leg swelling. Gastrointestinal: Negative. Negative for abdominal pain, heartburn, nausea and vomiting. Genitourinary: Negative. Negative for dysuria, frequency, hematuria and urgency. Musculoskeletal: Negative. Negative for back pain, joint pain, myalgias and neck pain. Skin: Negative. Negative for itching and rash. Neurological: Negative. Negative for dizziness, tingling, tremors and headaches. Psychiatric/Behavioral: Negative. Negative for depression and memory loss. The patient is not nervous/anxious and does not have insomnia. Visit Vitals    /75 (BP 1 Location: Left arm, BP Patient Position: Sitting)    Pulse 77    Temp 97.7 °F (36.5 °C) (Oral)    Resp 18    Ht 5' 9\" (1.753 m)    Wt 200 lb (90.7 kg)    SpO2 95%    BMI 29.53 kg/m2       Physical Exam   Constitutional: He is oriented to person, place, and time. He appears well-developed and well-nourished. No distress. HENT:   Head: Normocephalic and atraumatic. Right Ear: Tympanic membrane, external ear and ear canal normal.   Left Ear: Tympanic membrane, external ear and ear canal normal.   Nose: Mucosal edema present. No rhinorrhea. Right sinus exhibits no maxillary sinus tenderness and no frontal sinus tenderness. Left sinus exhibits no maxillary sinus tenderness and no frontal sinus tenderness. Mouth/Throat: Uvula is midline, oropharynx is clear and moist and mucous membranes are normal.   Cardiovascular: Normal rate, regular rhythm and normal heart sounds. Pulmonary/Chest: Effort normal. No respiratory distress. He has wheezes. He has no rales. Neurological: He is alert and oriented to person, place, and time. Skin: Skin is warm and dry. He is not diaphoretic. Psychiatric: He has a normal mood and affect. His behavior is normal.       ASSESSMENT and PLAN    ICD-10-CM ICD-9-CM    1. Cough R05 786.2 AMB POC EKG ROUTINE W/ 12 LEADS, INTER & REP      XR CHEST PA LAT      amoxicillin-clavulanate (AUGMENTIN) 875-125 mg per tablet      predniSONE (DELTASONE) 20 mg tablet   2. REARDON (dyspnea on exertion) R06.09 786.09 AMB POC EKG ROUTINE W/ 12 LEADS, INTER & REP      XR CHEST PA LAT      ECHO TTE STRESS EXERCISE TREADMILL COMP      ECHO TTE STRESS EXRCSE COMP W OR WO CONTR      amoxicillin-clavulanate (AUGMENTIN) 875-125 mg per tablet      predniSONE (DELTASONE) 20 mg tablet   3. Encounter for immunization Z23 V03.89 ADMIN INFLUENZA VIRUS VAC      INFLUENZA VIRUS VACCINE, HIGH DOSE SEASONAL, PRESERVATIVE FREE   4. Chest pain, unspecified type R07.9 786.50 ECHO TTE STRESS EXERCISE TREADMILL COMP      ECHO TTE STRESS EXRCSE COMP W OR WO CONTR   5. Bronchitis J40 490 amoxicillin-clavulanate (AUGMENTIN) 875-125 mg per tablet      predniSONE (DELTASONE) 20 mg tablet   6. Reactive airway disease, unspecified asthma severity, uncomplicated H08.963 087.15 fluticasone-salmeterol (ADVAIR) 250-50 mcg/dose diskus inhaler     Follow-up Disposition:  Return if symptoms worsen or fail to improve. Pt expressed understanding of visit summary and plans for any follow ups or referrals as well as any medications prescribed.

## 2017-09-05 NOTE — PROGRESS NOTES
Yulisa Cantu presents today at the clinic for      Chief Complaint   Patient presents with    Cough    Shortness of Breath        Wt Readings from Last 3 Encounters:   09/05/17 200 lb (90.7 kg)   08/17/17 201 lb (91.2 kg)   07/12/17 208 lb (94.3 kg)     Temp Readings from Last 3 Encounters:   09/05/17 97.7 °F (36.5 °C) (Oral)   08/17/17 98.7 °F (37.1 °C) (Oral)   07/12/17 98.2 °F (36.8 °C) (Oral)     BP Readings from Last 3 Encounters:   09/05/17 123/75   08/17/17 127/80   07/12/17 134/66     Pulse Readings from Last 3 Encounters:   09/05/17 77   08/17/17 72   07/12/17 83       There are no preventive care reminders to display for this patient. Coordination of Care:    1. Have you been to the ER, urgent care clinic since your last visit? Hospitalized since your last visit? No    2. Have you seen or consulted any other health care providers outside of the [address] [place] [address] since your last visit? Include any pap smears or colon screening.  No

## 2017-09-07 ENCOUNTER — HOSPITAL ENCOUNTER (OUTPATIENT)
Dept: LAB | Age: 67
Discharge: HOME | End: 2017-09-07
Payer: MEDICARE

## 2017-09-07 LAB
AMPHET UR QL SCN: NEGATIVE
BARBITURATES UR QL SCN: NEGATIVE
BENZODIAZ UR QL: NEGATIVE
CANNABINOIDS UR QL SCN: NEGATIVE
COCAINE UR QL SCN: NEGATIVE
HDSCOM,HDSCOM: NORMAL
METHADONE UR QL: NEGATIVE
OPIATES UR QL: NEGATIVE
PCP UR QL: NEGATIVE

## 2017-09-07 PROCEDURE — 36415 COLL VENOUS BLD VENIPUNCTURE: CPT

## 2017-09-07 PROCEDURE — 80307 DRUG TEST PRSMV CHEM ANLYZR: CPT

## 2017-09-19 DIAGNOSIS — R06.09 DOE (DYSPNEA ON EXERTION): ICD-10-CM

## 2017-09-19 DIAGNOSIS — R07.9 CHEST PAIN, UNSPECIFIED TYPE: ICD-10-CM

## 2017-09-20 ENCOUNTER — TELEPHONE (OUTPATIENT)
Dept: INTERNAL MEDICINE CLINIC | Age: 67
End: 2017-09-20

## 2017-09-20 NOTE — TELEPHONE ENCOUNTER
The itinerary for MrRae Patricia from the hospital states to not take any medications the day of his appt but to bring them all with him

## 2017-09-20 NOTE — TELEPHONE ENCOUNTER
Mr Zo Howe called to advise that he is having a stress test tomorrow, 9/21.   He wants to know if he needs to stop taking any of his medication prior to this test?

## 2017-10-02 ENCOUNTER — TELEPHONE (OUTPATIENT)
Dept: INTERNAL MEDICINE CLINIC | Age: 67
End: 2017-10-02

## 2017-10-12 DIAGNOSIS — E11.9 CONTROLLED TYPE 2 DIABETES MELLITUS WITHOUT COMPLICATION, WITHOUT LONG-TERM CURRENT USE OF INSULIN (HCC): ICD-10-CM

## 2017-10-12 DIAGNOSIS — I10 ESSENTIAL HYPERTENSION: ICD-10-CM

## 2017-10-13 RX ORDER — NIFEDIPINE 60 MG/1
60 TABLET, EXTENDED RELEASE ORAL DAILY
Qty: 90 TAB | Refills: 0 | Status: SHIPPED | OUTPATIENT
Start: 2017-10-13 | End: 2018-01-18 | Stop reason: ALTCHOICE

## 2017-10-13 RX ORDER — METFORMIN HYDROCHLORIDE 750 MG/1
750 TABLET, EXTENDED RELEASE ORAL DAILY
Qty: 90 TAB | Refills: 0 | Status: SHIPPED | OUTPATIENT
Start: 2017-10-13 | End: 2018-01-17 | Stop reason: SDUPTHER

## 2018-01-17 DIAGNOSIS — G62.9 NEUROPATHY: ICD-10-CM

## 2018-01-17 DIAGNOSIS — E11.9 CONTROLLED TYPE 2 DIABETES MELLITUS WITHOUT COMPLICATION, WITHOUT LONG-TERM CURRENT USE OF INSULIN (HCC): ICD-10-CM

## 2018-01-17 DIAGNOSIS — R05.9 COUGH: ICD-10-CM

## 2018-01-17 DIAGNOSIS — E78.00 HYPERCHOLESTEREMIA: Primary | ICD-10-CM

## 2018-01-17 DIAGNOSIS — R35.1 NOCTURIA: ICD-10-CM

## 2018-01-17 DIAGNOSIS — R23.8 SKIN IRRITATION: ICD-10-CM

## 2018-01-17 DIAGNOSIS — L21.0 DANDRUFF: ICD-10-CM

## 2018-01-17 DIAGNOSIS — I10 ESSENTIAL HYPERTENSION: ICD-10-CM

## 2018-01-18 ENCOUNTER — OFFICE VISIT (OUTPATIENT)
Dept: INTERNAL MEDICINE CLINIC | Age: 68
End: 2018-01-18

## 2018-01-18 VITALS
TEMPERATURE: 98.2 F | HEIGHT: 69 IN | BODY MASS INDEX: 29.77 KG/M2 | HEART RATE: 73 BPM | RESPIRATION RATE: 18 BRPM | WEIGHT: 201 LBS | OXYGEN SATURATION: 95 % | DIASTOLIC BLOOD PRESSURE: 82 MMHG | SYSTOLIC BLOOD PRESSURE: 136 MMHG

## 2018-01-18 DIAGNOSIS — E78.5 HYPERLIPIDEMIA, UNSPECIFIED HYPERLIPIDEMIA TYPE: ICD-10-CM

## 2018-01-18 DIAGNOSIS — R05.9 COUGH: ICD-10-CM

## 2018-01-18 DIAGNOSIS — L21.0 DANDRUFF: ICD-10-CM

## 2018-01-18 DIAGNOSIS — E11.9 TYPE 2 DIABETES MELLITUS WITHOUT COMPLICATION, WITHOUT LONG-TERM CURRENT USE OF INSULIN (HCC): Primary | ICD-10-CM

## 2018-01-18 DIAGNOSIS — I10 HYPERTENSION, UNSPECIFIED TYPE: ICD-10-CM

## 2018-01-18 RX ORDER — ALBUTEROL SULFATE 90 UG/1
2 AEROSOL, METERED RESPIRATORY (INHALATION)
Qty: 3 INHALER | Refills: 2 | Status: SHIPPED | OUTPATIENT
Start: 2018-01-18 | End: 2019-01-20 | Stop reason: SDUPTHER

## 2018-01-18 RX ORDER — GABAPENTIN 100 MG/1
100 CAPSULE ORAL 3 TIMES DAILY
Qty: 270 CAP | Refills: 1 | Status: SHIPPED | OUTPATIENT
Start: 2018-01-18 | End: 2018-04-12

## 2018-01-18 RX ORDER — FLUTICASONE PROPIONATE AND SALMETEROL 100; 50 UG/1; UG/1
1 POWDER RESPIRATORY (INHALATION) EVERY 12 HOURS
Qty: 3 INHALER | Refills: 3 | Status: SHIPPED | OUTPATIENT
Start: 2018-01-18 | End: 2019-06-25 | Stop reason: SDUPTHER

## 2018-01-18 RX ORDER — LOSARTAN POTASSIUM 100 MG/1
100 TABLET ORAL DAILY
Qty: 90 TAB | Refills: 1 | Status: SHIPPED | OUTPATIENT
Start: 2018-01-18 | End: 2018-02-21 | Stop reason: ALTCHOICE

## 2018-01-18 RX ORDER — METFORMIN HYDROCHLORIDE 750 MG/1
750 TABLET, EXTENDED RELEASE ORAL DAILY
Qty: 90 TAB | Refills: 0 | Status: SHIPPED | OUTPATIENT
Start: 2018-01-18 | End: 2018-04-12 | Stop reason: ALTCHOICE

## 2018-01-18 RX ORDER — ATORVASTATIN CALCIUM 10 MG/1
10 TABLET, FILM COATED ORAL DAILY
Qty: 90 TAB | Refills: 1 | Status: SHIPPED | OUTPATIENT
Start: 2018-01-18 | End: 2018-07-13 | Stop reason: SDUPTHER

## 2018-01-18 RX ORDER — NIFEDIPINE 60 MG/1
60 TABLET, EXTENDED RELEASE ORAL DAILY
Qty: 90 TAB | Refills: 0 | Status: SHIPPED | OUTPATIENT
Start: 2018-01-18 | End: 2018-02-21

## 2018-01-18 RX ORDER — TAMSULOSIN HYDROCHLORIDE 0.4 MG/1
0.4 CAPSULE ORAL DAILY
Qty: 90 CAP | Refills: 1 | Status: SHIPPED | OUTPATIENT
Start: 2018-01-18 | End: 2018-06-29 | Stop reason: SDUPTHER

## 2018-01-18 RX ORDER — HYDROCORTISONE VALERATE 2 MG/G
CREAM TOPICAL 3 TIMES DAILY
Qty: 45 G | Refills: 5 | Status: SHIPPED | OUTPATIENT
Start: 2018-01-18 | End: 2018-04-12

## 2018-01-18 RX ORDER — KETOCONAZOLE 20 MG/ML
SHAMPOO TOPICAL
Qty: 3 BOTTLE | Refills: 11 | Status: SHIPPED | OUTPATIENT
Start: 2018-01-18 | End: 2018-06-05 | Stop reason: SDUPTHER

## 2018-01-18 NOTE — PROGRESS NOTES
Chief Complaint   Patient presents with    Diabetes    Hypertension     1. Have you been to the ER, urgent care clinic since your last visit? Hospitalized since your last visit? No    2. Have you seen or consulted any other health care providers outside of the [address] [place] [address] since your last visit? Include any pap smears or colon screening.  No

## 2018-01-18 NOTE — PROGRESS NOTES
HISTORY OF PRESENT ILLNESS  Toni Bagley is a 78 y.o. male. HPI Toni Bagley is here for follow up on diabetes, HTN and hypercholesterolemia. He also has started having a slight cough again. He had a bout with a persistent cough in the fall. He had 2 CXRs and states his sx finally improved with antibiotic treatment. He is not using his advair, but is taking a daily allergy medication (zyrtec). He has been to ortho for his back and has received injections. He states they placed him on voltaren and possibly norco. He was dc'd off Celebrex. He is requesting a refill on gabapentin. He is ok with the 100mg dose. I reviewed with him that it would be beneficial to be on an ACE or ARB. His wife believes he had been on an ACE before but was stopped b/c of cough. She said this was years ago. It was then he was put on Adalat. I suggested for the benefit of renal protection, will switch him to Cozaar. Also, advised that recommendations are to also be on a statin with the diabetes diagnoses. His LDL was mildly elevated also. Review of Systems   Constitutional: Negative. HENT: Positive for congestion (c/o occasional sinus congestion. Suggested he try a nasal spray such as flonase. His wife said they have some at home that he can try. ). Eyes: Negative for blurred vision. Respiratory: Positive for cough. Negative for shortness of breath and wheezing. Cardiovascular: Negative. Gastrointestinal: Negative. Musculoskeletal: Positive for back pain and joint pain. Skin: Positive for itching (requesting refill on westcort cream - itching of feet). Neurological: Negative for dizziness and headaches. Psychiatric/Behavioral: Negative. Physical Exam   Constitutional: He is oriented to person, place, and time. He appears well-developed and well-nourished. No distress. HENT:   Head: Normocephalic and atraumatic. Cardiovascular: Normal rate and regular rhythm.     No murmur heard.  Pulmonary/Chest: Effort normal and breath sounds normal. He has no wheezes. Musculoskeletal: He exhibits no edema. Neurological: He is alert and oriented to person, place, and time. Psychiatric: He has a normal mood and affect. His behavior is normal. Judgment and thought content normal.     Visit Vitals    /82 (BP 1 Location: Left arm, BP Patient Position: Sitting)    Pulse 73    Temp 98.2 °F (36.8 °C) (Oral)    Resp 18    Ht 5' 9\" (1.753 m)    Wt 201 lb (91.2 kg)    SpO2 95%    BMI 29.68 kg/m2       ASSESSMENT and PLAN    ICD-10-CM ICD-9-CM    1. Type 2 diabetes mellitus without complication, without long-term current use of insulin (HCC) E11.9 250.00 SC COLLECTION VENOUS BLOOD,VENIPUNCTURE      LIPID PANEL      HEMOGLOBIN A1C WITH EAG      METABOLIC PANEL, COMPREHENSIVE   2. Cough R05 786.2 meds refilled below:   3. Hypertension, unspecified type I10 401.9    4. Hyperlipidemia, unspecified hyperlipidemia type E78.5 272.4    5. Dandruff L21.0 690.18      Current Outpatient Prescriptions   Medication Sig    fluticasone-salmeterol (ADVAIR) 250-50 mcg/dose diskus inhaler Take 1 Puff by inhalation every twelve (12) hours.  cetirizine (ZYRTEC) 10 mg tablet Take 1 Tab by mouth daily.  hydrocortisone valerate (WESTCORT) 0.2 % topical cream Apply  to affected area three (3) times daily. use thin layer    gabapentin (NEURONTIN) 100 mg capsule Take 1 Cap by mouth three (3) times daily.  tamsulosin (FLOMAX) 0.4 mg capsule Take 1 Cap by mouth daily.  ketoconazole (NIZORAL) 2 % shampoo Shampoo daily, lather and let sit for 5 minutes, then rinse  Indications: Dandruff    albuterol (PROVENTIL HFA, VENTOLIN HFA, PROAIR HFA) 90 mcg/actuation inhaler Take 2 Puffs by inhalation every six (6) hours as needed for Wheezing.  NIFEdipine ER (ADALAT CC) 60 mg ER tablet Take 1 Tab by mouth daily.  metFORMIN ER (GLUCOPHAGE XR) 750 mg tablet Take 1 Tab by mouth daily.     losartan (COZAAR) 100 mg tablet Take 1 Tab by mouth daily.  atorvastatin (LIPITOR) 10 mg tablet Take 1 Tab by mouth daily. Pt verbalized understanding of their condition and diagnoses, treatment plan,  as well as side effects of any new medications prescribed.

## 2018-01-18 NOTE — PATIENT INSTRUCTIONS
Noninsulin Medicines for Type 2 Diabetes: Care Instructions  Your Care Instructions    There are different types of noninsulin medicines for diabetes. Each works in a different way. But they all help you control your blood sugar. Some types help your body make insulin to lower your blood sugar. Others lower how much insulin your body needs. Some can slow how fast your body digests sugars. And some can remove extra glucose through your urine. · Alpha-glucosidase inhibitors. These keep starches from breaking down. This means that they lower the amount of glucose absorbed when you eat. They don't help your body make more insulin. So they will not cause low blood sugar unless you use them with other medicines for diabetes. They include acarbose and miglitol. · DPP-4 inhibitors. These help your body raise the level of insulin after you eat. They also help your body make less of a hormone that raises blood sugar. They include linagliptin, saxagliptin, and sitagliptin. · Incretin hormones (GLP-1 receptor agonists). Your body makes a protein that can raise your insulin level. It also can lower your blood sugar and make you less hungry. You can get shots of hormones that work the same way. They include exenatide and liraglutide. · Meglitinides. These help your body release insulin. They also help slow how your body digests sugars. So they can keep your blood sugar from rising too fast after you eat. They include nateglinide and repaglinide. · Metformin. This lowers how much glucose your liver makes. And it helps you respond better to insulin. It also lowers the amount of stored sugar that your liver releases when you are not eating. · SGLT2 inhibitors. These help to remove extra glucose through your urine. They may also help some people lose weight. They include canagliflozin, dapagliflozin, and empagliflozin. · Sulfonylureas. These help your body release more insulin. Some work for many hours.  They can cause low blood sugar if you don't eat as you planned. They include glipizide and glyburide. · Thiazolidinediones. These reduce the amount of blood glucose. They also help you respond better to insulin. They include pioglitazone and rosiglitazone. You may need to take more than one medicine for diabetes. Two or more medicines may work better to lower your blood sugar level than just one does. Follow-up care is a key part of your treatment and safety. Be sure to make and go to all appointments, and call your doctor if you are having problems. It's also a good idea to know your test results and keep a list of the medicines you take. How can you care for yourself at home? · Eat a healthy diet. Get some exercise each day. This may help you to reduce how much medicine you need. · Do not take other prescription or over-the-counter medicines, vitamins, herbal products, or supplements without talking to your doctor first. Some medicines for type 2 diabetes can cause problems with other medicines or supplements. · Tell your doctor if you plan to get pregnant. Some of these drugs are not safe for pregnant women. · Be safe with medicines. Take your medicines exactly as prescribed. Meglitinides and sulfonylureas can cause your blood sugar to drop very low. Call your doctor if you think you are having a problem with your medicine. · Check your blood sugar often. You can use a glucose monitor. Keeping track can help you know how certain foods, activities, and medicines affect your blood sugar. And it can help you keep your blood sugar from getting so low that it's not safe. When should you call for help? Call 911 anytime you think you may need emergency care. For example, call if:  ? · You passed out (lost consciousness). ? · You are confused or cannot think clearly. ? · Your blood sugar is very high or very low. ? Watch closely for changes in your health, and be sure to contact your doctor if:  ? · Your blood sugar stays outside the level your doctor set for you. ? · You have any problems. Where can you learn more? Go to http://shivani-taylor.info/. Enter H153 in the search box to learn more about \"Noninsulin Medicines for Type 2 Diabetes: Care Instructions. \"  Current as of: March 13, 2017  Content Version: 11.4  © 4443-7160 [employer]. Care instructions adapted under license by [employer] (which disclaims liability or warranty for this information). If you have questions about a medical condition or this instruction, always ask your healthcare professional. [address] any warranty or liability for your use of this information.

## 2018-01-19 LAB
ALBUMIN SERPL-MCNC: 4.2 G/DL (ref 3.6–4.8)
ALBUMIN/GLOB SERPL: 1.5 {RATIO} (ref 1.2–2.2)
ALP SERPL-CCNC: 87 IU/L (ref 39–117)
ALT SERPL-CCNC: 118 IU/L (ref 0–44)
AST SERPL-CCNC: 48 IU/L (ref 0–40)
BILIRUB SERPL-MCNC: 0.3 MG/DL (ref 0–1.2)
BUN SERPL-MCNC: 20 MG/DL (ref 8–27)
BUN/CREAT SERPL: 26 (ref 10–24)
CALCIUM SERPL-MCNC: 9.2 MG/DL (ref 8.6–10.2)
CHLORIDE SERPL-SCNC: 102 MMOL/L (ref 96–106)
CHOLEST SERPL-MCNC: 167 MG/DL (ref 100–199)
CO2 SERPL-SCNC: 22 MMOL/L (ref 18–29)
CREAT SERPL-MCNC: 0.78 MG/DL (ref 0.76–1.27)
EST. AVERAGE GLUCOSE BLD GHB EST-MCNC: 126 MG/DL
GLOBULIN SER CALC-MCNC: 2.8 G/DL (ref 1.5–4.5)
GLUCOSE SERPL-MCNC: 165 MG/DL (ref 65–99)
HBA1C MFR BLD: 6 % (ref 4.8–5.6)
HDLC SERPL-MCNC: 44 MG/DL
LDLC SERPL CALC-MCNC: 93 MG/DL (ref 0–99)
POTASSIUM SERPL-SCNC: 4.3 MMOL/L (ref 3.5–5.2)
PROT SERPL-MCNC: 7 G/DL (ref 6–8.5)
SODIUM SERPL-SCNC: 143 MMOL/L (ref 134–144)
TRIGL SERPL-MCNC: 151 MG/DL (ref 0–149)
VLDLC SERPL CALC-MCNC: 30 MG/DL (ref 5–40)

## 2018-02-08 ENCOUNTER — OFFICE VISIT (OUTPATIENT)
Dept: INTERNAL MEDICINE CLINIC | Age: 68
End: 2018-02-08

## 2018-02-08 VITALS
HEART RATE: 68 BPM | SYSTOLIC BLOOD PRESSURE: 144 MMHG | RESPIRATION RATE: 18 BRPM | TEMPERATURE: 97.8 F | WEIGHT: 201 LBS | HEIGHT: 69 IN | DIASTOLIC BLOOD PRESSURE: 70 MMHG | BODY MASS INDEX: 29.77 KG/M2 | OXYGEN SATURATION: 95 %

## 2018-02-08 DIAGNOSIS — L84 FOOT CALLUS: ICD-10-CM

## 2018-02-08 DIAGNOSIS — M20.41 HAMMER TOES OF BOTH FEET: ICD-10-CM

## 2018-02-08 DIAGNOSIS — E11.9 TYPE 2 DIABETES MELLITUS WITHOUT COMPLICATION, WITHOUT LONG-TERM CURRENT USE OF INSULIN (HCC): Primary | ICD-10-CM

## 2018-02-08 DIAGNOSIS — M20.42 HAMMER TOES OF BOTH FEET: ICD-10-CM

## 2018-02-08 DIAGNOSIS — M20.21 HALLUX RIGIDUS OF BOTH FEET: ICD-10-CM

## 2018-02-08 DIAGNOSIS — M20.22 HALLUX RIGIDUS OF BOTH FEET: ICD-10-CM

## 2018-02-08 NOTE — MR AVS SNAPSHOT
[address] [place] [address] [facility] [address] [address] 
[phone] Patient: Avis Beth MRN: [id number] Visit Information Date & Time Provider Department Dept. Phone Encounter #  
 2/8/2018  3:00 PM Hubert James MD [facility] [id number] Follow-up Instructions Return if symptoms worsen or fail to improve. Upcoming Health Maintenance Date Due  
 FOOT EXAM Q1 2/8/2018 MEDICARE YEARLY EXAM 7/13/2018 HEMOGLOBIN A1C Q6M 7/18/2018 EYE EXAM RETINAL OR DILATED Q1 8/30/2018 MICROALBUMIN Q1 9/5/2018 LIPID PANEL Q1 1/18/2019 GLAUCOMA SCREENING Q2Y 8/30/2019 COLONOSCOPY 8/27/2025 DTaP/Tdap/Td series (3 - Td) 8/24/2026 Allergies as of 2/8/2018  Review Complete On: 2/8/2018 By: Hubert James MD  
 No Known Allergies Current Immunizations  Never Reviewed Name Date Influenza High Dose Vaccine PF 9/5/2017 Influenza Vaccine (Quad) PF 9/14/2016 Pneumococcal Conjugate (PCV-13) 8/24/2016 Pneumococcal Polysaccharide (PPSV-23) 8/30/2017 Tdap 2/18/2013 Not reviewed this visit You Were Diagnosed With   
  
 Codes Comments Type 2 diabetes mellitus without complication, without long-term current use of insulin (HCC)    -  Primary ICD-10-CM: E11.9 ICD-9-CM: 250.00 Hallux rigidus of both feet     ICD-10-CM: M20.21, M20.22 ICD-9-CM: 735.2 Hammer toes of both feet     ICD-10-CM: M20.41, M20.42 
ICD-9-CM: 735.4 Foot callus     ICD-10-CM: L84 
ICD-9-CM: 851 Vitals BP Pulse Temp Resp Height(growth percentile) Weight(growth percentile) 144/70 68 97.8 °F (36.6 °C) (Oral) 18 5' 9\" (1.753 m) 201 lb (91.2 kg) SpO2 BMI Smoking Status 95% 29.68 kg/m2 Never Smoker Vitals History BMI and BSA Data Body Mass Index Body Surface Area  
 29.68 kg/m 2 2.11 m 2 Preferred Pharmacy Pharmacy Name Phone [address], [place] [phone] Your Updated Medication List  
  
   
This list is accurate as of: 2/8/18  3:37 PM.  Always use your most recent med list.  
  
  
  
  
 albuterol 90 mcg/actuation inhaler Commonly known as:  PROVENTIL HFA, VENTOLIN HFA, PROAIR HFA Take 2 Puffs by inhalation every six (6) hours as needed for Wheezing. atorvastatin 10 mg tablet Commonly known as:  LIPITOR Take 1 Tab by mouth daily. cetirizine 10 mg tablet Commonly known as:  ZYRTEC Take 1 Tab by mouth daily. * fluticasone-salmeterol 250-50 mcg/dose diskus inhaler Commonly known as:  ADVAIR Take 1 Puff by inhalation every twelve (12) hours. * fluticasone-salmeterol 100-50 mcg/dose diskus inhaler Commonly known as:  ADVAIR Take 1 Puff by inhalation every twelve (12) hours. gabapentin 100 mg capsule Commonly known as:  NEURONTIN Take 1 Cap by mouth three (3) times daily. hydrocortisone valerate 0.2 % topical cream  
Commonly known as:  [employer] Apply  to affected area three (3) times daily. use thin layer  
  
 ketoconazole 2 % shampoo Commonly known as:  NIZORAL Shampoo daily, lather and let sit for 5 minutes, then rinse  Indications: Dandruff  
  
 losartan 100 mg tablet Commonly known as:  COZAAR Take 1 Tab by mouth daily. metFORMIN  mg tablet Commonly known as:  GLUCOPHAGE XR Take 1 Tab by mouth daily. NIFEdipine ER 60 mg ER tablet Commonly known as:  ADALAT CC Take 1 Tab by mouth daily. tamsulosin 0.4 mg capsule Commonly known as:  FLOMAX Take 1 Cap by mouth daily. * Notice: This list has 2 medication(s) that are the same as other medications prescribed for you. Read the directions carefully, and ask your doctor or other care provider to review them with you. Follow-up Instructions Return if symptoms worsen or fail to improve. Introducing [facility]! Dear Benedicto Love: Thank you for requesting a [employer] account. Our records indicate that you already have an active [employer] account. You can access your account anytime at [email or web address] Did you know that you can access your hospital and ER discharge instructions at any time in [employer]? You can also review all of your test results from your hospital stay or ER visit. Additional Information If you have questions, please visit the Frequently Asked Questions section of the [employer] website at [email or web address] Remember, [employer] is NOT to be used for urgent needs. For medical emergencies, dial 911. Now available from your iPhone and Android! Please provide this summary of care documentation to your next provider. Your primary care clinician is listed as Erroll Perks. If you have any questions after today's visit, please call [phone].

## 2018-02-08 NOTE — PROGRESS NOTES
Chief Complaint   Patient presents with    Other     papers for ortho shoes     1. Have you been to the ER, urgent care clinic since your last visit? Hospitalized since your last visit? No    2. Have you seen or consulted any other health care providers outside of the [address] [place] [address] since your last visit? Include any pap smears or colon screening.  No

## 2018-02-08 NOTE — PROGRESS NOTES
Subjective:   Patient is a 78y.o. year old male who presents for Other (papers for ortho shoes)  1. Diabetes:  Controlled with Metformin. Also on Cozaar to protect kidneys and Lipitor for cholesterol. He is here today for diabetic shoes and has paperwork to be filled out. Has already seen Dr. Shani Espinoza and has script in hand. Review of Systems   Constitutional: Negative. Neurological: Positive for tingling. Current Outpatient Prescriptions on File Prior to Visit   Medication Sig Dispense Refill    hydrocortisone valerate (WESTCORT) 0.2 % topical cream Apply  to affected area three (3) times daily. use thin layer 45 g 5    gabapentin (NEURONTIN) 100 mg capsule Take 1 Cap by mouth three (3) times daily. 270 Cap 1    tamsulosin (FLOMAX) 0.4 mg capsule Take 1 Cap by mouth daily. 90 Cap 1    ketoconazole (NIZORAL) 2 % shampoo Shampoo daily, lather and let sit for 5 minutes, then rinse  Indications: Dandruff 3 Bottle 11    albuterol (PROVENTIL HFA, VENTOLIN HFA, PROAIR HFA) 90 mcg/actuation inhaler Take 2 Puffs by inhalation every six (6) hours as needed for Wheezing. 3 Inhaler 2    NIFEdipine ER (ADALAT CC) 60 mg ER tablet Take 1 Tab by mouth daily. 90 Tab 0    metFORMIN ER (GLUCOPHAGE XR) 750 mg tablet Take 1 Tab by mouth daily. 90 Tab 0    losartan (COZAAR) 100 mg tablet Take 1 Tab by mouth daily. 90 Tab 1    atorvastatin (LIPITOR) 10 mg tablet Take 1 Tab by mouth daily. 90 Tab 1    fluticasone-salmeterol (ADVAIR) 100-50 mcg/dose diskus inhaler Take 1 Puff by inhalation every twelve (12) hours. 3 Inhaler 3    fluticasone-salmeterol (ADVAIR) 250-50 mcg/dose diskus inhaler Take 1 Puff by inhalation every twelve (12) hours. 1 Inhaler 2    cetirizine (ZYRTEC) 10 mg tablet Take 1 Tab by mouth daily. 90 Tab 1     No current facility-administered medications on file prior to visit. Reviewed PmHx, RxHx, FmHx, SocHx, AllgHx and updated and dated in the chart.     Nurse notes were reviewed and are correct    Objective:     Vitals:    02/08/18 1458 02/08/18 1459   BP: 149/74 144/70   Pulse: 68    Resp: 18    Temp: 97.8 °F (36.6 °C)    TempSrc: Oral    SpO2: 95%    Weight: 201 lb (91.2 kg)    Height: 5' 9\" (1.753 m)      Physical Exam   Constitutional: He appears well-developed and well-nourished. No distress. HENT:   Head: Normocephalic and atraumatic. Eyes: Conjunctivae and EOM are normal.   Neck: Normal range of motion. Neck supple. Skin: Skin is warm and dry. Psychiatric: He has a normal mood and affect. His behavior is normal. Judgment and thought content normal.   Nursing note and vitals reviewed. Diabetic foot exam:     Left: Reflexes 2+     Filament test 5/6   Pulse DP: 2+ (normal)   Pulse PT: 2+ (normal)   Deformities: calluses, hammer toes, hallux rigidus    Right: Reflexes 2+   Filament test 6/6   Pulse DP: 2+ (normal)   Pulse PT: 2+ (normal)   Deformities: calluses, hammer toes, hallux rigidus    Assessment/ Plan:     Diagnoses and all orders for this visit:    1. Type 2 diabetes mellitus without complication, without long-term current use of insulin [facility]):  He has active diabetes and is on medication for this. 2. Hallux rigidus of both feet    3. Hammer toes of both feet    4. Foot callus  He is in need of new diabetic shoes. I filled out the paperwork and am sending it with him. I have discussed the diagnosis with the patient and the intended plan as seen in the above orders. The patient verbalized understanding and agrees with the plan. Follow-up Disposition:  Return if symptoms worsen or fail to improve.     Stevo uRbio MD

## 2018-02-21 ENCOUNTER — OFFICE VISIT (OUTPATIENT)
Dept: INTERNAL MEDICINE CLINIC | Age: 68
End: 2018-02-21

## 2018-02-21 VITALS
WEIGHT: 200 LBS | DIASTOLIC BLOOD PRESSURE: 83 MMHG | SYSTOLIC BLOOD PRESSURE: 149 MMHG | RESPIRATION RATE: 18 BRPM | BODY MASS INDEX: 29.62 KG/M2 | HEIGHT: 69 IN | TEMPERATURE: 97.9 F | HEART RATE: 65 BPM | OXYGEN SATURATION: 98 %

## 2018-02-21 DIAGNOSIS — R79.89 ELEVATED LFTS: ICD-10-CM

## 2018-02-21 DIAGNOSIS — I10 ESSENTIAL HYPERTENSION: Primary | ICD-10-CM

## 2018-02-21 DIAGNOSIS — Z91.09 ENVIRONMENTAL ALLERGIES: ICD-10-CM

## 2018-02-21 RX ORDER — VALSARTAN 160 MG/1
160 TABLET ORAL DAILY
Qty: 90 TAB | Refills: 1 | Status: SHIPPED | OUTPATIENT
Start: 2018-02-21 | End: 2018-04-12 | Stop reason: ALTCHOICE

## 2018-02-21 RX ORDER — CETIRIZINE HCL 10 MG
10 TABLET ORAL DAILY
Qty: 90 TAB | Refills: 1 | Status: SHIPPED | OUTPATIENT
Start: 2018-02-21 | End: 2018-10-07 | Stop reason: SDUPTHER

## 2018-02-21 NOTE — PROGRESS NOTES
HISTORY OF PRESENT ILLNESS  Homero Felix is a 78 y.o. male. HPI Mr. Amparo Calderon is here for follow up on his BP. It has been high when he checked it several times since switching it from Adalat to Cozaar 100. He is also following up on elevated LFTS - will repeat today. Hep C testing was negative. He believes he had hepatitis at some point in his life, but does not know what type. Review of Systems   Constitutional: Negative. Cardiovascular: Negative for chest pain and leg swelling. Neurological: Negative for dizziness. Physical Exam   Constitutional: He is oriented to person, place, and time. He appears well-developed and well-nourished. No distress. HENT:   Head: Normocephalic and atraumatic. Eyes: Conjunctivae are normal.   Cardiovascular: Normal rate and regular rhythm. Pulmonary/Chest: Effort normal and breath sounds normal.   Neurological: He is alert and oriented to person, place, and time. Psychiatric: He has a normal mood and affect. His behavior is normal. Judgment and thought content normal.     Visit Vitals    /83 (BP 1 Location: Left arm, BP Patient Position: Sitting)    Pulse 65    Temp 97.9 °F (36.6 °C) (Oral)    Resp 18    Ht 5' 9\" (1.753 m)    Wt 200 lb (90.7 kg)    SpO2 98%    BMI 29.53 kg/m2     Wt Readings from Last 3 Encounters:   02/21/18 200 lb (90.7 kg)   02/08/18 201 lb (91.2 kg)   01/18/18 201 lb (91.2 kg)       ASSESSMENT and PLAN    ICD-10-CM ICD-9-CM    1. Essential hypertension I10 401.9 valsartan (DIOVAN) 160 mg tablet   2. Environmental allergies Z91.09 V15.09 cetirizine (ZYRTEC) 10 mg tablet   3. Elevated LFTs R79.89 790.6 COLLECTION VENOUS BLOOD,VENIPUNCTURE      HEPATIC FUNCTION PANEL     Pt verbalized understanding of their condition and diagnoses, treatment plan,  as well as side effects of any new medications prescribed.

## 2018-02-21 NOTE — PATIENT INSTRUCTIONS
High Blood Pressure: Care Instructions  Your Care Instructions    If your blood pressure is usually above 140/90, you have high blood pressure, or hypertension. That means the top number is 140 or higher or the bottom number is 90 or higher, or both. Despite what a lot of people think, high blood pressure usually doesn't cause headaches or make you feel dizzy or lightheaded. It usually has no symptoms. But it does increase your risk for heart attack, stroke, and kidney or eye damage. The higher your blood pressure, the more your risk increases. Your doctor will give you a goal for your blood pressure. Your goal will be based on your health and your age. An example of a goal is to keep your blood pressure below 140/90. Lifestyle changes, such as eating healthy and being active, are always important to help lower blood pressure. You might also take medicine to reach your blood pressure goal.  Follow-up care is a key part of your treatment and safety. Be sure to make and go to all appointments, and call your doctor if you are having problems. It's also a good idea to know your test results and keep a list of the medicines you take. How can you care for yourself at home? Medical treatment  · If you stop taking your medicine, your blood pressure will go back up. You may take one or more types of medicine to lower your blood pressure. Be safe with medicines. Take your medicine exactly as prescribed. Call your doctor if you think you are having a problem with your medicine. · Talk to your doctor before you start taking aspirin every day. Aspirin can help certain people lower their risk of a heart attack or stroke. But taking aspirin isn't right for everyone, because it can cause serious bleeding. · See your doctor regularly. You may need to see the doctor more often at first or until your blood pressure comes down.   · If you are taking blood pressure medicine, talk to your doctor before you take decongestants or anti-inflammatory medicine, such as ibuprofen. Some of these medicines can raise blood pressure. · Learn how to check your blood pressure at home. Lifestyle changes  · Stay at a healthy weight. This is especially important if you put on weight around the waist. Losing even 10 pounds can help you lower your blood pressure. · If your doctor recommends it, get more exercise. Walking is a good choice. Bit by bit, increase the amount you walk every day. Try for at least 30 minutes on most days of the week. You also may want to swim, bike, or do other activities. · Avoid or limit alcohol. Talk to your doctor about whether you can drink any alcohol. · Try to limit how much sodium you eat to less than 2,300 milligrams (mg) a day. Your doctor may ask you to try to eat less than 1,500 mg a day. · Eat plenty of fruits (such as bananas and oranges), vegetables, legumes, whole grains, and low-fat dairy products. · Lower the amount of saturated fat in your diet. Saturated fat is found in animal products such as milk, cheese, and meat. Limiting these foods may help you lose weight and also lower your risk for heart disease. · Do not smoke. Smoking increases your risk for heart attack and stroke. If you need help quitting, talk to your doctor about stop-smoking programs and medicines. These can increase your chances of quitting for good. When should you call for help? Call 911 anytime you think you may need emergency care. This may mean having symptoms that suggest that your blood pressure is causing a serious heart or blood vessel problem. Your blood pressure may be over 180/110. ? For example, call 911 if:  ? · You have symptoms of a heart attack. These may include:  ¨ Chest pain or pressure, or a strange feeling in the chest.  ¨ Sweating. ¨ Shortness of breath. ¨ Nausea or vomiting.   ¨ Pain, pressure, or a strange feeling in the back, neck, jaw, or upper belly or in one or both shoulders or arms.  ¨ Lightheadedness or sudden weakness. ¨ A fast or irregular heartbeat. ? · You have symptoms of a stroke. These may include:  ¨ Sudden numbness, tingling, weakness, or loss of movement in your face, arm, or leg, especially on only one side of your body. ¨ Sudden vision changes. ¨ Sudden trouble speaking. ¨ Sudden confusion or trouble understanding simple statements. ¨ Sudden problems with walking or balance. ¨ A sudden, severe headache that is different from past headaches. ? · You have severe back or belly pain. ?Do not wait until your blood pressure comes down on its own. Get help right away. ?Call your doctor now or seek immediate care if:  ? · Your blood pressure is much higher than normal (such as 180/110 or higher), but you don't have symptoms. ? · You think high blood pressure is causing symptoms, such as:  ¨ Severe headache. ¨ Blurry vision. ? Watch closely for changes in your health, and be sure to contact your doctor if:  ? · Your blood pressure measures 140/90 or higher at least 2 times. That means the top number is 140 or higher or the bottom number is 90 or higher, or both. ? · You think you may be having side effects from your blood pressure medicine. ? · Your blood pressure is usually normal, but it goes above normal at least 2 times. Where can you learn more? Go to http://shivani-taylor.info/. Enter P060 in the search box to learn more about \"High Blood Pressure: Care Instructions. \"  Current as of: September 21, 2016  Content Version: 11.4  © 0638-5003 [employer]. Care instructions adapted under license by [employer] (which disclaims liability or warranty for this information). If you have questions about a medical condition or this instruction, always ask your healthcare professional. [address] any warranty or liability for your use of this information.

## 2018-02-21 NOTE — PROGRESS NOTES
Chief Complaint   Patient presents with    Hypertension    Diabetes    Cholesterol Problem     1. Have you been to the ER, urgent care clinic since your last visit? Hospitalized since your last visit? No    2. Have you seen or consulted any other health care providers outside of [facility] since your last visit? Include any pap smears or colon screening.  No

## 2018-02-22 LAB
ALBUMIN SERPL-MCNC: 4.2 G/DL (ref 3.6–4.8)
ALP SERPL-CCNC: 81 IU/L (ref 39–117)
ALT SERPL-CCNC: 138 IU/L (ref 0–44)
AST SERPL-CCNC: 62 IU/L (ref 0–40)
BILIRUB DIRECT SERPL-MCNC: 0.17 MG/DL (ref 0–0.4)
BILIRUB SERPL-MCNC: 0.4 MG/DL (ref 0–1.2)
PROT SERPL-MCNC: 6.7 G/DL (ref 6–8.5)

## 2018-03-15 ENCOUNTER — TELEPHONE (OUTPATIENT)
Dept: INTERNAL MEDICINE CLINIC | Age: 68
End: 2018-03-15

## 2018-03-15 DIAGNOSIS — R74.8 ELEVATED LIVER ENZYMES: Primary | ICD-10-CM

## 2018-03-15 NOTE — TELEPHONE ENCOUNTER
Mr Leomolly Reyes called requesting the [place] [facility] that he has been discussing with Brooks Higginbotham. He would like to go to [facility]. 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

## 2018-03-21 DIAGNOSIS — R74.8 ELEVATED LIVER ENZYMES: ICD-10-CM

## 2018-04-12 ENCOUNTER — OFFICE VISIT (OUTPATIENT)
Dept: INTERNAL MEDICINE CLINIC | Age: 68
End: 2018-04-12

## 2018-04-12 VITALS
DIASTOLIC BLOOD PRESSURE: 59 MMHG | OXYGEN SATURATION: 95 % | SYSTOLIC BLOOD PRESSURE: 95 MMHG | RESPIRATION RATE: 16 BRPM | HEART RATE: 67 BPM | WEIGHT: 190 LBS | TEMPERATURE: 98.5 F | HEIGHT: 69 IN | BODY MASS INDEX: 28.14 KG/M2

## 2018-04-12 DIAGNOSIS — I61.4 LEFT-SIDED NONTRAUMATIC INTRACEREBRAL HEMORRHAGE OF CEREBELLUM (HCC): Primary | ICD-10-CM

## 2018-04-12 DIAGNOSIS — G47.9 DIFFICULTY SLEEPING: ICD-10-CM

## 2018-04-12 DIAGNOSIS — E11.9 TYPE 2 DIABETES MELLITUS WITHOUT COMPLICATION, WITHOUT LONG-TERM CURRENT USE OF INSULIN (HCC): ICD-10-CM

## 2018-04-12 DIAGNOSIS — I10 ESSENTIAL HYPERTENSION: ICD-10-CM

## 2018-04-12 DIAGNOSIS — R42 DIZZINESS: ICD-10-CM

## 2018-04-12 DIAGNOSIS — R79.89 ELEVATED LFTS: ICD-10-CM

## 2018-04-12 DIAGNOSIS — K59.00 CONSTIPATION, UNSPECIFIED CONSTIPATION TYPE: ICD-10-CM

## 2018-04-12 DIAGNOSIS — Z91.89 AT RISK FOR STRESS ULCER: ICD-10-CM

## 2018-04-12 DIAGNOSIS — R52 GENERALIZED PAIN: ICD-10-CM

## 2018-04-12 RX ORDER — THERA TABS 400 MCG
1 TAB ORAL DAILY
COMMUNITY
End: 2018-04-12 | Stop reason: SDUPTHER

## 2018-04-12 RX ORDER — LOSARTAN POTASSIUM 50 MG/1
50 TABLET ORAL DAILY
Qty: 90 TAB | Refills: 1 | Status: SHIPPED | OUTPATIENT
Start: 2018-04-12 | End: 2018-07-13 | Stop reason: SDUPTHER

## 2018-04-12 RX ORDER — SCOLOPAMINE TRANSDERMAL SYSTEM 1 MG/1
1 PATCH, EXTENDED RELEASE TRANSDERMAL
COMMUNITY
End: 2018-07-13

## 2018-04-12 RX ORDER — FAMOTIDINE 20 MG/1
20 TABLET, FILM COATED ORAL 2 TIMES DAILY
COMMUNITY
End: 2020-01-16 | Stop reason: ALTCHOICE

## 2018-04-12 RX ORDER — DOCUSATE SODIUM 100 MG/1
100 CAPSULE, LIQUID FILLED ORAL 2 TIMES DAILY
COMMUNITY
End: 2020-01-16 | Stop reason: ALTCHOICE

## 2018-04-12 RX ORDER — AMLODIPINE BESYLATE 5 MG/1
5 TABLET ORAL DAILY
Qty: 90 TAB | Refills: 1 | Status: SHIPPED | OUTPATIENT
Start: 2018-04-12 | End: 2018-07-13 | Stop reason: SDUPTHER

## 2018-04-12 RX ORDER — ACETAMINOPHEN 325 MG/1
TABLET ORAL
COMMUNITY
End: 2018-07-13

## 2018-04-12 RX ORDER — METFORMIN HYDROCHLORIDE 500 MG/1
500 TABLET, EXTENDED RELEASE ORAL
Qty: 180 TAB | Refills: 1 | Status: SHIPPED | OUTPATIENT
Start: 2018-04-12 | End: 2018-07-13 | Stop reason: SDUPTHER

## 2018-04-12 RX ORDER — METOPROLOL TARTRATE 25 MG/1
25 TABLET, FILM COATED ORAL 2 TIMES DAILY
COMMUNITY
End: 2018-04-12 | Stop reason: SDUPTHER

## 2018-04-12 RX ORDER — LOSARTAN POTASSIUM 50 MG/1
TABLET ORAL DAILY
COMMUNITY
End: 2018-04-12 | Stop reason: SDUPTHER

## 2018-04-12 RX ORDER — AMLODIPINE BESYLATE 5 MG/1
5 TABLET ORAL DAILY
COMMUNITY
End: 2018-04-12 | Stop reason: SDUPTHER

## 2018-04-12 RX ORDER — METFORMIN HYDROCHLORIDE 500 MG/1
1000 TABLET, FILM COATED, EXTENDED RELEASE ORAL DAILY
COMMUNITY
End: 2018-04-12

## 2018-04-12 RX ORDER — THERA TABS 400 MCG
1 TAB ORAL DAILY
Qty: 100 TAB | Refills: 2 | Status: SHIPPED | OUTPATIENT
Start: 2018-04-12 | End: 2018-07-13 | Stop reason: SDUPTHER

## 2018-04-12 RX ORDER — CHOLECALCIFEROL (VITAMIN D3) 125 MCG
5 CAPSULE ORAL
COMMUNITY
End: 2022-02-28

## 2018-04-12 RX ORDER — METOPROLOL TARTRATE 25 MG/1
25 TABLET, FILM COATED ORAL 2 TIMES DAILY
Qty: 180 TAB | Refills: 1 | Status: SHIPPED | OUTPATIENT
Start: 2018-04-12 | End: 2018-07-13 | Stop reason: SDUPTHER

## 2018-04-12 RX ORDER — MECLIZINE HCL 12.5 MG 12.5 MG/1
TABLET ORAL
COMMUNITY
End: 2018-07-13

## 2018-04-12 NOTE — PATIENT INSTRUCTIONS
Lightheadedness or Faintness: Care Instructions  Your Care Instructions  Lightheadedness is a feeling that you are about to faint or \"pass out. \" You do not feel as if you or your surroundings are moving. It is different from vertigo, which is the feeling that you or things around you are spinning or tilting. Lightheadedness usually goes away or gets better when you lie down. If lightheadedness gets worse, it can lead to a fainting spell. It is common to feel lightheaded from time to time. Lightheadedness usually is not caused by a serious problem. It often is caused by a short-lasting drop in blood pressure and blood flow to your head that occurs when you get up too quickly from a seated or lying position. Follow-up care is a key part of your treatment and safety. Be sure to make and go to all appointments, and call your doctor if you are having problems. It's also a good idea to know your test results and keep a list of the medicines you take. How can you care for yourself at home? · Lie down for 1 or 2 minutes when you feel lightheaded. After lying down, sit up slowly and remain sitting for 1 to 2 minutes before slowly standing up. · Avoid movements, positions, or activities that have made you lightheaded in the past.  · Get plenty of rest, especially if you have a cold or flu, which can cause lightheadedness. · Make sure you drink plenty of fluids, especially if you have a fever or have been sweating. · Do not drive or put yourself and others in danger while you feel lightheaded. When should you call for help? Call 911 anytime you think you may need emergency care. For example, call if:  ? · You have symptoms of a stroke. These may include:  ¨ Sudden numbness, tingling, weakness, or loss of movement in your face, arm, or leg, especially on only one side of your body. ¨ Sudden vision changes. ¨ Sudden trouble speaking. ¨ Sudden confusion or trouble understanding simple statements.   ¨ Sudden problems with walking or balance. ¨ A sudden, severe headache that is different from past headaches. ? · You have symptoms of a heart attack. These may include:  ¨ Chest pain or pressure, or a strange feeling in the chest.  ¨ Sweating. ¨ Shortness of breath. ¨ Nausea or vomiting. ¨ Pain, pressure, or a strange feeling in the back, neck, jaw, or upper belly or in one or both shoulders or arms. ¨ Lightheadedness or sudden weakness. ¨ A fast or irregular heartbeat. After you call 911, the  may tell you to chew 1 adult-strength or 2 to 4 low-dose aspirin. Wait for an ambulance. Do not try to drive yourself. ? Watch closely for changes in your health, and be sure to contact your doctor if:  ? · Your lightheadedness gets worse or does not get better with home care. Where can you learn more? Go to http://shivani-taylor.info/. Enter Q144 in the search box to learn more about \"Lightheadedness or Faintness: Care Instructions. \"  Current as of: March 20, 2017  Content Version: 11.4  © 6748-3213 [employer]. Care instructions adapted under license by [employer] (which disclaims liability or warranty for this information). If you have questions about a medical condition or this instruction, always ask your healthcare professional. [address] any warranty or liability for your use of this information.

## 2018-04-12 NOTE — PROGRESS NOTES
HISTORY OF PRESENT ILLNESS  Brenda Yeh is a 78 y.o. male. HPI MrRae Cardenas is here to follow up after hospitalization for cerebellar hemorrhage. He had onset of dizziness, headache and nausea and vomiting at work several weeks ago and felt he was going to black out. EMS was called and he was taken to the ER and admitted. He is still c/o dizziness and headache. He states neurosurgery advised him that these sx may take a while to resolve. We did not low BP readings here in the office today with 95/59 being the higher reading. At home, his BP has been somewhat higher. It seems to have dropped after he took his second lopressor today. I suggested he decrease it from tid to bid and continue to monitor his BP. He is going to PT. He has not regained his balance. He is supposed to be using a walker or cane, but does not have it with him today. MR HEAD W/O CONTRAST (03/29/2018 7:16 AM)  MR HEAD W/O CONTRAST (03/29/2018 7:16 AM)   Specimen Performing Laboratory     RADIOLOGY       MR HEAD W/O CONTRAST (03/29/2018 7:16 AM)   Impressions   Impression:        1. Subacute appearing 19 mm diameter intraparenchymal hematoma medial left cerebellum in the region of the dentate nucleus. No evidence of associated infarct or vascular malformation. No definite associated mass although limited evaluation for associated mass without gadolinium. Differential diagnosis most likely involves hypertensive hemorrhage, especially given history.        2. Tiny focus of chronic microbleed left parietal occipital white matter likely related to hypertension.        3. Very small likely chronic cortical infarct right parietal perisylvian region with minimal deep white matter signal changes nonspecific, possible chronic microvascular ischemic changes.       Hospital Course   Chief Complaint/History of Present Illness:    Pt presented to [facility] with dizziness, N/V and unsteady gait.  Pt was found to have  cerebellar bleed with extension into fourth ventricle. Pt was admitted [facility] ICU.   79 yo male with small hypertensive L cerebellar hemorrhage without hydrocephalus.       1. Left cerebellar intraparenchymal hemorrhage, 2 x 2 x 1.2 cm with extension into the fourth ventricle and some surrounding edema no midline shift or mass-effect. Cerebellar bleed with extension into fourth ventricle  SLP recommends nectar and regular. No straws. Medications should be crushed or whole in applesauce or pudding. MRI head done today and showed Subacute appearing 19 mm diameter intraparenchymal hematoma medial left cerebellum in the region of the dentate nucleus. No evidence of associated infarct or vascular malformation. No definite associated mass although limited evaluation for associated mass without gadolinium. Differential diagnosis most likely involves hypertensive hemorrhage, especially given history. ICH was due to hypertensive emergency  Cleared by Neurosurgery for discharge, f/u with Dr Ilir Valadez in 2 weeks     2. Dizziness, persistent, related to above  Cont Meclizine     3. HTN - continue losartan 100 mg daily, metoprolol, norvasc  4. DM 2 - metformin  5. ARRON not yet set up for CPAP  6. Asthma, not exacerbation  7. Chronic Pain, on chronic opoid therapy      He does not believe he ever took the diovan that I prescribed. He's thinking he never got it from the pharmacy. He has a follow up with Dr. Saadia Lombardo on 4/18. His current meds now include:  Current Outpatient Prescriptions   Medication Sig    acetaminophen (TYLENOL) 325 mg tablet Take  by mouth every four (4) hours as needed for Pain.  amLODIPine (NORVASC) 5 mg tablet Take 5 mg by mouth daily.  docusate sodium (COLACE) 100 mg capsule Take 100 mg by mouth two (2) times a day.  famotidine (PEPCID) 20 mg tablet Take 20 mg by mouth two (2) times a day.  losartan (COZAAR) 50 mg tablet Take  by mouth daily.     meclizine (ANTIVERT) 12.5 mg tablet Take  by mouth three (3) times daily as needed.  melatonin 3 mg tablet Take  by mouth.  metFORMIN (GLUMETZA ER) 500 mg TG24 24 hour tablet Take 1,000 mg by mouth daily.  metoprolol tartrate (LOPRESSOR) 25 mg tablet Take 25 mg by mouth two (2) times a day.  scopolamine (TRANSDERM-SCOP) 1 mg over 3 days pt3d 1 Patch by TransDERmal route every seventy-two (72) hours.  SITagliptin (JANUVIA) 50 mg tablet Take 50 mg by mouth daily.  therapeutic multivitamin (THERAGRAN) tablet Take 1 Tab by mouth daily.  tamsulosin (FLOMAX) 0.4 mg capsule Take 1 Cap by mouth daily.  ketoconazole (NIZORAL) 2 % shampoo Shampoo daily, lather and let sit for 5 minutes, then rinse  Indications: Dandruff    albuterol (PROVENTIL HFA, VENTOLIN HFA, PROAIR HFA) 90 mcg/actuation inhaler Take 2 Puffs by inhalation every six (6) hours as needed for Wheezing.  atorvastatin (LIPITOR) 10 mg tablet Take 1 Tab by mouth daily.  fluticasone-salmeterol (ADVAIR) 100-50 mcg/dose diskus inhaler Take 1 Puff by inhalation every twelve (12) hours.  cetirizine (ZYRTEC) 10 mg tablet Take 1 Tab by mouth daily. No current facility-administered medications for this visit. I reviewed with him his liver US that was done recently that I had ordered:showing increased echogenicity, likely fatty liver vs hepatocellular disease. Will refer to GI. Review of Systems   Constitutional: Negative. Eyes: Negative for double vision. Respiratory: Negative. Cardiovascular: Negative. Neurological: Positive for dizziness and headaches. Physical Exam   Constitutional: He is oriented to person, place, and time. He appears well-developed and well-nourished. No distress. Neurological: He is alert and oriented to person, place, and time. He has normal strength.  Gait (unsteady) abnormal.   Psychiatric: His speech is normal and behavior is normal. Judgment and thought content normal. Cognition and memory are normal.   He is quiet, not talkative, but this is not atypical. His wife helps to answer most questions. Visit Vitals    BP 95/59 (BP 1 Location: Left arm, BP Patient Position: Sitting)    Pulse 67    Temp 98.5 °F (36.9 °C) (Oral)    Resp 16    Ht 5' 9\" (1.753 m)    Wt 190 lb (86.2 kg)    SpO2 95%    BMI 28.06 kg/m2     Diagnoses and all orders for this visit:    1. 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     2. Elevated LFTs  -     REFERRAL TO GASTROENTEROLOGY    3. Type 2 diabetes mellitus without complication, without long-term current use of insulin (HCC)  -     SITagliptin (JANUVIA) 50 mg tablet; Take 1 Tab by mouth daily. - added by hospital  -     metFORMIN ER (GLUCOPHAGE XR) 500 mg tablet; Take 1 Tab by mouth daily (with dinner). 4. Essential hypertension  -     amLODIPine (NORVASC) 5 mg tablet; Take 1 Tab by mouth daily. -     losartan (COZAAR) 50 mg tablet; Take 1 Tab by mouth daily. -     metoprolol tartrate (LOPRESSOR) 25 mg tablet; Take 1 Tab by mouth two (2) times a day. - decreased from tid due to drops in BP    5. At risk for stress ulcer -pepcid    6. Dizziness - antivert    7. Generalized pain - tylenol - advised to use caution and limit use due to elevated LFTs. 8. Constipation, unspecified constipation type - colace    9. Difficulty sleeping - melatonin    Other orders  -     therapeutic multivitamin (THERAGRAN) tablet; Take 1 Tab by mouth daily. Pt verbalized understanding of their condition and diagnoses, treatment plan,  as well as side effects of any new medications prescribed.

## 2018-04-12 NOTE — PROGRESS NOTES
Chief Complaint   Patient presents with   [facility] Follow Up     1. Have you been to the ER, urgent care clinic since your last visit? Hospitalized since your last visit? Yes [facility]: [facility]    2. Have you seen or consulted any other health care providers outside of the [address] [place] [address] since your last visit? Include any pap smears or colon screening.  No

## 2018-06-05 ENCOUNTER — OFFICE VISIT (OUTPATIENT)
Dept: INTERNAL MEDICINE CLINIC | Age: 68
End: 2018-06-05

## 2018-06-05 VITALS
HEART RATE: 63 BPM | WEIGHT: 188 LBS | OXYGEN SATURATION: 98 % | TEMPERATURE: 97.8 F | SYSTOLIC BLOOD PRESSURE: 126 MMHG | HEIGHT: 69 IN | RESPIRATION RATE: 18 BRPM | BODY MASS INDEX: 27.85 KG/M2 | DIASTOLIC BLOOD PRESSURE: 73 MMHG

## 2018-06-05 DIAGNOSIS — L21.0 DANDRUFF: ICD-10-CM

## 2018-06-05 DIAGNOSIS — N52.9 ERECTILE DYSFUNCTION, UNSPECIFIED ERECTILE DYSFUNCTION TYPE: ICD-10-CM

## 2018-06-05 DIAGNOSIS — Z92.89 HISTORY OF POSITIVE PPD: Primary | ICD-10-CM

## 2018-06-05 RX ORDER — SILDENAFIL 100 MG/1
100 TABLET, FILM COATED ORAL AS NEEDED
Qty: 12 TAB | Refills: 0 | Status: SHIPPED | OUTPATIENT
Start: 2018-06-05 | End: 2018-07-13 | Stop reason: SDUPTHER

## 2018-06-05 RX ORDER — VARDENAFIL HYDROCHLORIDE 20 MG/1
20 TABLET ORAL AS NEEDED
Qty: 12 TAB | Refills: 0 | Status: SHIPPED | OUTPATIENT
Start: 2018-06-05 | End: 2018-07-13

## 2018-06-05 NOTE — PATIENT INSTRUCTIONS
Learning About Tuberculosis (TB)  What is tuberculosis (TB)? Tuberculosis (TB) is a serious disease caused by a type of bacteria that is spread through the air. TB is easily spread from person to person through coughs or sneezes. It usually occurs in the lungs, but it can spread to other parts of the body. What is latent TB? Latent TB means that you have bacteria in your body that could cause active TB, but your body's defenses (immune system) are keeping it from turning into active TB. You don't have any symptoms, so you don't feel sick. And you can't spread it to others. While this means that you don't have active TB, you can develop the disease at some point if you don't have treatment. What is active TB? You get active TB (TB disease) when the bacteria that cause the disease overcome your body's defenses. Some people get active TB right away. Others may develop it later if the immune system gets weak. When you have active TB, you can spread the disease to others. What are the symptoms of active TB? People with active TB may:  · Cough. · Lose their appetite and lose weight. · Have night sweats. · Have a fever. · Have chills. · Feel tired and weak. How is it diagnosed? Latent TB  You may have a skin or blood test to find out if you have latent TB. Skin tests are used most often. For a skin test, a small needle places testing material under your skin. In a few days, you will go back to your testing place to be checked for a skin reaction. For a blood test, your blood is taken and tested. Active TB  You will start with a skin or blood test. If that test shows that you have been exposed to TB, you will have an X-ray of your chest. The X-ray will show if there is damage to your lungs. And you may be asked to cough up some mucus that is tested in a lab. This test shows if TB is in your lungs. If it is, you can spread it to others by coughing or even just breathing out.   How is it treated? Latent TB  If you have latent TB, you may be treated with one or more antibiotics for 3 to 9 months. A health professional may watch you take your medicine. This is called directly observed therapy (DOT). DOT is done to help make sure that you don't miss a dose and that all the bacteria are killed. Active TB  The first phase of treatment for active TB lasts 2 months. During this phase, you take several different medicines. The second phase of treatment can last 4 to 7 months or longer. During this phase, the number of medicines you take may be reduced. Treatment for TB takes a long time because the bacteria die very slowly. You will likely start to feel better after a few weeks of treatment. But don't stop your treatment until your doctor says all of the TB bacteria are dead. A health professional may watch you take your medicine to make sure that you don't miss a dose. Missing doses or stopping medicine too soon can make you sick again. Then you may have to start treatment over again. And the infection can be harder to treat if you have to start over. Follow-up care is a key part of your treatment and safety. Be sure to make and go to all appointments, and call your doctor if you are having problems. It's also a good idea to know your test results and keep a list of the medicines you take. Where can you learn more? Go to http://shivani-taylor.info/. Enter 0356 [id number] in the search box to learn more about \"Learning About Tuberculosis (TB). \"  Current as of: March 3, 2017  Content Version: 11.4  © 7915-1632 [employer]. Care instructions adapted under license by [employer] (which disclaims liability or warranty for this information). If you have questions about a medical condition or this instruction, always ask your healthcare professional. [address] any warranty or liability for your use of this information.

## 2018-06-05 NOTE — PROGRESS NOTES
HISTORY OF PRESENT ILLNESS  Zeeshan Quevedo is a 78 y.o. male. HPI Mr. Giovanni Garcia is here with his wife to discuss his medical history of having a positive tb test. He states he had been tested several times throughout his [employer] career and the tests were positive, but the CXRs were normal. His concern seems to be that he was never treated for t.b. I questioned if he had the bcg vaccine in the [facility] and he can't say for sure. I advised him that the bcg vaccine would explain his positive tests in the past. He is a citizen of the [address] [place] [address] and I also discussed he would have needed to be checked for these diseases upon immigration. Recent CXRs showed no evidence of t.b. I offered to send him for a new CXR specifically for a TB screen due to h/o positive test and he would like to do that. He is also wanting some specific medical records to turn in to the [place]. Advised him he would need to sign a medical release and our medical records dept will handle that. He is requesting to re-try,re-start viagra. I am not certain which E.[place] medication [place], so I wrote him an RX for Levitra and Viagra. Patient Active Problem List   Diagnosis Code    Advance directive discussed with patient Z70.80    Type 2 diabetes mellitus without complication, without long-term current use of insulin ([facility] [address] E11.9    Essential hypertension I10       Review of Systems   Constitutional: Negative. HENT: Negative. Eyes: Negative. Respiratory: Negative for cough, shortness of breath and wheezing. Cardiovascular: Negative. Physical Exam   Constitutional: He is oriented to person, place, and time. He appears well-developed and well-nourished. HENT:   Head: Normocephalic and atraumatic. Cardiovascular: Normal rate. Pulmonary/Chest: Effort normal and breath sounds normal.   Neurological: He is alert and oriented to person, place, and time.      Visit Vitals    /73    Pulse 63    Temp 97.8 °F (36.6 °C) (Oral)  Resp 18    Ht 5' 9\" (1.753 m)    Wt 188 lb (85.3 kg)    SpO2 98%    BMI 27.76 kg/m2       ASSESSMENT and PLAN    ICD-10-CM ICD-9-CM    1. History of positive PPD R76.11 795.51 XR CHEST PA LAT   2. Erectile dysfunction, unspecified erectile dysfunction type N52.9 607.84 sildenafil citrate (VIAGRA) 100 mg tablet      vardenafil (LEVITRA) 20 mg tablet     Pt verbalized understanding of their condition and diagnoses, treatment plan,  as well as side effects of any new medications prescribed. Will follow up with pt when CXR results are available    Total time spent with pt 30min, greater than 50% which was spent counseling.

## 2018-06-06 RX ORDER — KETOCONAZOLE 20 MG/ML
SHAMPOO TOPICAL
Qty: 3 BOTTLE | Refills: 11 | Status: SHIPPED | OUTPATIENT
Start: 2018-06-06 | End: 2019-03-24 | Stop reason: SDUPTHER

## 2018-06-06 NOTE — TELEPHONE ENCOUNTER
From: Alvaro Salazar  To: Santy Tran PA-C  Sent: 6/5/2018 4:59 PM EDT  Subject: Medication Renewal Request    Original authorizing provider: DAFNE Stevenson would like a refill of the following medications:  ketoconazole (NIZORAL) 2 % shampoo Santy Tran PA-C]    Preferred pharmacy: [facility] [address]    Comment:

## 2018-06-29 DIAGNOSIS — E11.9 TYPE 2 DIABETES MELLITUS WITHOUT COMPLICATION, WITHOUT LONG-TERM CURRENT USE OF INSULIN (HCC): ICD-10-CM

## 2018-06-29 DIAGNOSIS — N52.9 ERECTILE DYSFUNCTION, UNSPECIFIED ERECTILE DYSFUNCTION TYPE: ICD-10-CM

## 2018-06-29 DIAGNOSIS — R35.1 NOCTURIA: ICD-10-CM

## 2018-06-29 DIAGNOSIS — I10 ESSENTIAL HYPERTENSION: ICD-10-CM

## 2018-06-29 DIAGNOSIS — L21.0 DANDRUFF: ICD-10-CM

## 2018-06-29 NOTE — TELEPHONE ENCOUNTER
From: Lucy Young  To: Britt Lynne PA-C  Sent: 6/29/2018 1:14 PM EDT  Subject: Medication Renewal Request    Original authorizing provider: DAFNE Montesinos would like a refill of the following medications:  tamsulosin (FLOMAX) 0.4 mg capsule Astrid Lewis PA-C]  losartan (COZAAR) 50 mg tablet [Nena Lewis PA-C]  metFORMIN ER (GLUCOPHAGE XR) 500 mg tablet Astrid Lewis PA-C]  sildenafil citrate (VIAGRA) 100 mg tablet [Nena Lewis PA-C]  ketoconazole (NIZORAL) 2 % shampoo Britt Lynne PA-C]    Preferred pharmacy: [facility] [address]    Comment:

## 2018-07-02 RX ORDER — TAMSULOSIN HYDROCHLORIDE 0.4 MG/1
0.4 CAPSULE ORAL DAILY
Qty: 90 CAP | Refills: 1 | Status: SHIPPED | OUTPATIENT
Start: 2018-07-02 | End: 2018-07-13 | Stop reason: SDUPTHER

## 2018-07-02 RX ORDER — KETOCONAZOLE 20 MG/ML
SHAMPOO TOPICAL
Qty: 3 BOTTLE | Refills: 11 | Status: CANCELLED | OUTPATIENT
Start: 2018-07-02

## 2018-07-02 RX ORDER — METFORMIN HYDROCHLORIDE 500 MG/1
500 TABLET, EXTENDED RELEASE ORAL
Qty: 180 TAB | Refills: 1 | Status: CANCELLED | OUTPATIENT
Start: 2018-07-02

## 2018-07-02 RX ORDER — SILDENAFIL 100 MG/1
100 TABLET, FILM COATED ORAL AS NEEDED
Qty: 12 TAB | Refills: 0 | Status: CANCELLED | OUTPATIENT
Start: 2018-07-02

## 2018-07-02 RX ORDER — LOSARTAN POTASSIUM 50 MG/1
50 TABLET ORAL DAILY
Qty: 90 TAB | Refills: 1 | Status: CANCELLED | OUTPATIENT
Start: 2018-07-02

## 2018-07-02 NOTE — TELEPHONE ENCOUNTER
I think I refilled most if not all of these meds? I gave a 90 day supply with a refill in April and then I just did the viagra and nizoral in June. Can you call him and let him know.

## 2018-07-13 ENCOUNTER — OFFICE VISIT (OUTPATIENT)
Dept: INTERNAL MEDICINE CLINIC | Age: 68
End: 2018-07-13

## 2018-07-13 ENCOUNTER — HOSPITAL ENCOUNTER (OUTPATIENT)
Dept: LAB | Age: 68
Discharge: HOME | End: 2018-07-13
Payer: MEDICARE

## 2018-07-13 VITALS
OXYGEN SATURATION: 97 % | BODY MASS INDEX: 27.4 KG/M2 | HEART RATE: 60 BPM | SYSTOLIC BLOOD PRESSURE: 104 MMHG | RESPIRATION RATE: 18 BRPM | WEIGHT: 185 LBS | TEMPERATURE: 98.5 F | DIASTOLIC BLOOD PRESSURE: 65 MMHG | HEIGHT: 69 IN

## 2018-07-13 DIAGNOSIS — E83.19 IRON EXCESS: ICD-10-CM

## 2018-07-13 DIAGNOSIS — Z00.00 MEDICARE ANNUAL WELLNESS VISIT, SUBSEQUENT: ICD-10-CM

## 2018-07-13 DIAGNOSIS — E78.00 HYPERCHOLESTEREMIA: ICD-10-CM

## 2018-07-13 DIAGNOSIS — I10 ESSENTIAL HYPERTENSION: ICD-10-CM

## 2018-07-13 DIAGNOSIS — R35.1 NOCTURIA: ICD-10-CM

## 2018-07-13 DIAGNOSIS — E11.9 TYPE 2 DIABETES MELLITUS WITHOUT COMPLICATION, WITHOUT LONG-TERM CURRENT USE OF INSULIN (HCC): Primary | ICD-10-CM

## 2018-07-13 DIAGNOSIS — N52.9 ERECTILE DYSFUNCTION, UNSPECIFIED ERECTILE DYSFUNCTION TYPE: ICD-10-CM

## 2018-07-13 DIAGNOSIS — E66.3 OVERWEIGHT (BMI 25.0-29.9): ICD-10-CM

## 2018-07-13 DIAGNOSIS — E11.9 TYPE 2 DIABETES MELLITUS WITHOUT COMPLICATION, WITHOUT LONG-TERM CURRENT USE OF INSULIN (HCC): ICD-10-CM

## 2018-07-13 LAB
ALBUMIN SERPL-MCNC: 3.9 G/DL (ref 3.4–5)
ALBUMIN/GLOB SERPL: 1.3 {RATIO} (ref 0.8–1.7)
ALP SERPL-CCNC: 62 U/L (ref 45–117)
ALT SERPL-CCNC: 29 U/L (ref 16–61)
ANION GAP SERPL CALC-SCNC: 9 MMOL/L (ref 3–18)
AST SERPL-CCNC: 17 U/L (ref 15–37)
BASOPHILS # BLD: 0.1 K/UL (ref 0–0.1)
BASOPHILS NFR BLD: 1 % (ref 0–2)
BILIRUB SERPL-MCNC: 0.3 MG/DL (ref 0.2–1)
BUN SERPL-MCNC: 21 MG/DL (ref 7–18)
BUN/CREAT SERPL: 32 (ref 12–20)
CALCIUM SERPL-MCNC: 8.5 MG/DL (ref 8.5–10.1)
CHLORIDE SERPL-SCNC: 106 MMOL/L (ref 100–108)
CHOLEST SERPL-MCNC: 168 MG/DL
CO2 SERPL-SCNC: 27 MMOL/L (ref 21–32)
CREAT SERPL-MCNC: 0.65 MG/DL (ref 0.6–1.3)
DIFFERENTIAL METHOD BLD: NORMAL
EOSINOPHIL # BLD: 0.2 K/UL (ref 0–0.4)
EOSINOPHIL NFR BLD: 3 % (ref 0–5)
ERYTHROCYTE [DISTWIDTH] IN BLOOD BY AUTOMATED COUNT: 12.6 % (ref 11.6–14.5)
GLOBULIN SER CALC-MCNC: 3.1 G/DL (ref 2–4)
GLUCOSE SERPL-MCNC: 103 MG/DL (ref 74–99)
HCT VFR BLD AUTO: 44.3 % (ref 36–48)
HDLC SERPL-MCNC: 60 MG/DL (ref 40–60)
HDLC SERPL: 2.8 {RATIO} (ref 0–5)
HGB BLD-MCNC: 14.7 G/DL (ref 13–16)
IRON SATN MFR SERPL: 22 %
IRON SERPL-MCNC: 70 UG/DL (ref 50–175)
LDLC SERPL CALC-MCNC: 92.8 MG/DL (ref 0–100)
LIPID PROFILE,FLP: NORMAL
LYMPHOCYTES # BLD: 1.5 K/UL (ref 0.9–3.6)
LYMPHOCYTES NFR BLD: 22 % (ref 21–52)
MCH RBC QN AUTO: 30.4 PG (ref 24–34)
MCHC RBC AUTO-ENTMCNC: 33.2 G/DL (ref 31–37)
MCV RBC AUTO: 91.7 FL (ref 74–97)
MONOCYTES # BLD: 0.6 K/UL (ref 0.05–1.2)
MONOCYTES NFR BLD: 8 % (ref 3–10)
NEUTS SEG # BLD: 4.5 K/UL (ref 1.8–8)
NEUTS SEG NFR BLD: 66 % (ref 40–73)
PLATELET # BLD AUTO: 257 K/UL (ref 135–420)
PMV BLD AUTO: 9.4 FL (ref 9.2–11.8)
POTASSIUM SERPL-SCNC: 4.2 MMOL/L (ref 3.5–5.5)
PROT SERPL-MCNC: 7 G/DL (ref 6.4–8.2)
RBC # BLD AUTO: 4.83 M/UL (ref 4.7–5.5)
SODIUM SERPL-SCNC: 142 MMOL/L (ref 136–145)
TIBC SERPL-MCNC: 317 UG/DL (ref 250–450)
TRIGL SERPL-MCNC: 76 MG/DL (ref ?–150)
VLDLC SERPL CALC-MCNC: 15.2 MG/DL
WBC # BLD AUTO: 6.9 K/UL (ref 4.6–13.2)

## 2018-07-13 PROCEDURE — 80061 LIPID PANEL: CPT | Performed by: PHYSICIAN ASSISTANT

## 2018-07-13 PROCEDURE — 80053 COMPREHEN METABOLIC PANEL: CPT | Performed by: PHYSICIAN ASSISTANT

## 2018-07-13 PROCEDURE — 83036 HEMOGLOBIN GLYCOSYLATED A1C: CPT | Performed by: PHYSICIAN ASSISTANT

## 2018-07-13 PROCEDURE — 83540 ASSAY OF IRON: CPT | Performed by: PHYSICIAN ASSISTANT

## 2018-07-13 PROCEDURE — 85025 COMPLETE CBC W/AUTO DIFF WBC: CPT | Performed by: PHYSICIAN ASSISTANT

## 2018-07-13 RX ORDER — THERA TABS 400 MCG
1 TAB ORAL DAILY
Qty: 100 TAB | Refills: 2 | Status: SHIPPED | OUTPATIENT
Start: 2018-07-13 | End: 2019-03-24 | Stop reason: SDUPTHER

## 2018-07-13 RX ORDER — LOSARTAN POTASSIUM 50 MG/1
50 TABLET ORAL DAILY
Qty: 90 TAB | Refills: 1 | Status: SHIPPED | OUTPATIENT
Start: 2018-07-13 | End: 2019-04-20 | Stop reason: SDUPTHER

## 2018-07-13 RX ORDER — METFORMIN HYDROCHLORIDE 500 MG/1
500 TABLET, EXTENDED RELEASE ORAL
Qty: 180 TAB | Refills: 1 | Status: SHIPPED | OUTPATIENT
Start: 2018-07-13 | End: 2018-12-10 | Stop reason: SDUPTHER

## 2018-07-13 RX ORDER — ATORVASTATIN CALCIUM 10 MG/1
10 TABLET, FILM COATED ORAL DAILY
Qty: 90 TAB | Refills: 1 | Status: SHIPPED | OUTPATIENT
Start: 2018-07-13 | End: 2019-05-02 | Stop reason: SDUPTHER

## 2018-07-13 RX ORDER — TAMSULOSIN HYDROCHLORIDE 0.4 MG/1
0.4 CAPSULE ORAL DAILY
Qty: 90 CAP | Refills: 1 | Status: SHIPPED | OUTPATIENT
Start: 2018-07-13 | End: 2019-02-03 | Stop reason: SDUPTHER

## 2018-07-13 RX ORDER — SILDENAFIL 100 MG/1
100 TABLET, FILM COATED ORAL AS NEEDED
Qty: 12 TAB | Refills: 2 | Status: SHIPPED | OUTPATIENT
Start: 2018-07-13 | End: 2018-10-07 | Stop reason: SDUPTHER

## 2018-07-13 RX ORDER — VARDENAFIL HYDROCHLORIDE 20 MG/1
20 TABLET ORAL AS NEEDED
Qty: 12 TAB | Refills: 0 | Status: CANCELLED | OUTPATIENT
Start: 2018-07-13

## 2018-07-13 RX ORDER — METOPROLOL TARTRATE 25 MG/1
25 TABLET, FILM COATED ORAL 2 TIMES DAILY
Qty: 180 TAB | Refills: 1 | Status: SHIPPED | OUTPATIENT
Start: 2018-07-13 | End: 2018-12-10 | Stop reason: SDUPTHER

## 2018-07-13 RX ORDER — AMLODIPINE BESYLATE 5 MG/1
5 TABLET ORAL DAILY
Qty: 90 TAB | Refills: 1 | Status: SHIPPED | OUTPATIENT
Start: 2018-07-13 | End: 2019-05-02 | Stop reason: SDUPTHER

## 2018-07-13 NOTE — PROGRESS NOTES
Chief Complaint   Patient presents with    Diabetes    Hypertension    Cholesterol Problem    Annual Wellness Visit     1. Have you been to the ER, urgent care clinic since your last visit? Hospitalized since your last visit? No    2. Have you seen or consulted any other health care providers outside of the [address] [place] [address] [place] since your last visit? Include any pap smears or colon screening.  Yes Where: Dr. Ndiaye      ADL Assessment 7/13/2018   Feeding yourself No Help Needed   Getting from bed to chair No Help Needed   Getting dressed No Help Needed   Bathing or showering No Help Needed   Walk across the room (includes cane/walker) Help Needed   Using the telphone No Help Needed   Taking your medications No Help Needed   Preparing meals Help Needed   Managing money (expenses/bills) No Help Needed   Moderately strenuous housework (laundry) No Help Needed   Shopping for personal items (toiletries/medicines) No Help Needed   Shopping for groceries No Help Needed   Driving No Help Needed   Climbing a flight of stairs Help Needed   Getting to places beyond walking distances No Help Needed       ACP given to pt today

## 2018-07-13 NOTE — PROGRESS NOTES
HISTORY OF PRESENT ILLNESS  Ray Metz is a 80 y.o. male. HPI Ray Metz is here for follow up on diabetes, HTN and hypercholesterolemia. He has been seeing GI for elevated liver enzymes. He does have fatty liver and is working on weight loss. His wife states he has been told his iron is elevated. His wife is concerned about his memory. She is un-sure if he underwent memory testing or evaluation during his work-up for the intracranial hemorrhage he sustained earlier this year. I suggested she call neurology and inquire about this. A mini-mental status exam was administered today in the office. He scored a 28/30. He missed 2/3 recall words. Review of Systems   Constitutional: Negative. Respiratory: Negative. Cardiovascular: Negative. Gastrointestinal: Negative. Musculoskeletal: Positive for joint pain (knees and back. ). Neurological: Negative for dizziness and headaches. Psychiatric/Behavioral: Positive for memory loss (per wife). Physical Exam   Constitutional: He is oriented to person, place, and time. He appears well-developed and well-nourished. No distress. HENT:   Head: Normocephalic and atraumatic. Eyes: Conjunctivae are normal.   Cardiovascular: Normal rate and regular rhythm. Pulmonary/Chest: Effort normal and breath sounds normal. He has no wheezes. Neurological: He is alert and oriented to person, place, and time. Psychiatric: He has a normal mood and affect. His behavior is normal. Judgment and thought content normal. His speech is delayed (he hesitates with answering questions, but this is not significantly different than how he has been in the past).  Cognition and memory are normal.      Visit Vitals    /65 (BP 1 Location: Left arm, BP Patient Position: Sitting)    Pulse 60    Temp 98.5 °F (36.9 °C) (Oral)    Resp 18    Ht 5' 9\" (1.753 m)    Wt 185 lb (83.9 kg)    SpO2 97%    BMI 27.32 kg/m2     Wt Readings from Last 3 Encounters:   07/13/18 185 lb (83.9 kg)   06/05/18 188 lb (85.3 kg)   04/12/18 190 lb (86.2 kg)          ASSESSMENT and PLAN    ICD-10-CM ICD-9-CM    1. Type 2 diabetes mellitus without complication, without long-term current use of insulin ([facility]) N94.2 284.38 METABOLIC PANEL, COMPREHENSIVE      LIPID PANEL      CBC WITH AUTOMATED DIFF      HEMOGLOBIN A1C WITH EAG      COLLECTION VENOUS BLOOD,VENIPUNCTURE      SITagliptin (JANUVIA) 50 mg tablet      metFORMIN ER (GLUCOPHAGE XR) 500 mg tablet   2. Nocturia R35.1 788.43 tamsulosin (FLOMAX) 0.4 mg capsule   3. Essential hypertension I10 401.9 amLODIPine (NORVASC) 5 mg tablet      losartan (COZAAR) 50 mg tablet      metoprolol tartrate (LOPRESSOR) 25 mg tablet   4. Hypercholesteremia E78.00 272.0 LIPID PANEL      atorvastatin (LIPITOR) 10 mg tablet   5. Erectile dysfunction, unspecified erectile dysfunction type N52.9 607.84 sildenafil citrate (VIAGRA) 100 mg tablet   6. Iron excess E83.19 275.09 IRON PROFILE   7. Overweight (BMI 25.0-29. 9) E66.3 278.02 Has lost 5 lbs since April. Advised to continue to work on diet, weight loss. Pt verbalized understanding of their condition and diagnoses, treatment plan,  as well as side effects of any new medications prescribed.

## 2018-07-13 NOTE — PATIENT INSTRUCTIONS
Schedule of Personalized Health Plan  (Provide Copy to Patient)  The best way to stay healthy is to live a healthy lifestyle. A healthy lifestyle includes regular exercise, eating a well-balanced diet, keeping a healthy weight and not smoking. Regular physical exams and screening tests are another important way to take care of yourself. Preventive exams provided by health care providers can find health problems early when treatment works best and can keep you from getting certain diseases or illnesses. Preventive services include exams, lab tests, screenings, shots, monitoring and information to help you take care of your own health. All people over 65 should have a pneumonia shot. Pneumonia shots are usually only needed once in a lifetime unless your doctor decides differently. All people over 65 should have a yearly flu shot. People over 65 are at medium to high risk for Hepatitis B. Three shots are needed for complete protection. In addition to your physical exam, some screening tests are recommended:    Bone mass measurement (dexa scan) is recommended every two years if you have certain risk factors, such as personal history of vertebral fracture or chronic steroid medication use    Diabetes Mellitus screening is recommended every year. Glaucoma is an eye disease caused by high pressure in the eye. An eye exam is recommended every year. Cardiovascular screening tests that check your cholesterol and other blood fat (lipid) levels are recommended every five years. Colorectal Cancer screening tests help to find pre-cancerous polyps (growths in the colon) so they can be removed before they turn into cancer. Tests ordered for screening depend on your personal and family history risk factors.     Screening for Prostate Cancer is recommended yearly with a digital rectal exam and/or a PSA test    Here is a list of your current Health Maintenance items with a due date:  Health Maintenance Topic Date Due    Influenza Age 5 to Adult  08/01/2018    EYE EXAM RETINAL OR DILATED Q1  08/30/2018    MICROALBUMIN Q1  09/05/2018    HEMOGLOBIN A1C Q6M  01/13/2019    LIPID PANEL Q1  01/18/2019    FOOT EXAM Q1  02/21/2019    MEDICARE YEARLY EXAM  07/14/2019    GLAUCOMA SCREENING Q2Y  08/30/2019    COLONOSCOPY  08/27/2025    DTaP/Tdap/Td series (3 - Td) 08/24/2026    Hepatitis C Screening  Completed    ZOSTER VACCINE AGE 60>  Completed    Pneumococcal 65+ Low/Medium Risk  Addressed

## 2018-07-13 NOTE — PROGRESS NOTES
Saad Wilson is a 80 y.o. male and presents for annual Medicare Wellness Visit. Problem List: Reviewed with patient and discussed risk factors. Patient Active Problem List   Diagnosis Code    Advance directive discussed with patient Z70.80    Type 2 diabetes mellitus without complication, without long-term current use of insulin ([facility] [address] E11.9    Essential hypertension I10       Current medical providers:  Patient Care Team:  Rene Reddy PA-C as PCP - General (Family Practice)  Dinesh Hinojosa MD as Physician (Colon and Rectal Surgery)    PSH: Reviewed with patient  Past Surgical History:   Procedure Laterality Date    COLONOSCOPY  10/13/2015 Return 10/14/2025    Dr. Jass Sanchez; screening    HX COLONOSCOPY      HX KNEE ARTHROSCOPY      right knee        SH: Reviewed with patient  Social History   Substance Use Topics    Smoking status: Never Smoker    Smokeless tobacco: Never Used    Alcohol use 1.0 oz/week     2 Cans of beer per week       FH: Reviewed with patient  Family History   Problem Relation Age of Onset    No Known Problems Mother     No Known Problems Father        Medications/Allergies: Reviewed with patient  Current Outpatient Prescriptions on File Prior to Visit   Medication Sig Dispense Refill    ketoconazole (NIZORAL) 2 % shampoo Shampoo daily, lather and let sit for 5 minutes, then rinse  Indications: Dandruff 3 Bottle 11    docusate sodium (COLACE) 100 mg capsule Take 100 mg by mouth two (2) times a day.  famotidine (PEPCID) 20 mg tablet Take 20 mg by mouth two (2) times a day.  melatonin 3 mg tablet Take  by mouth.  cetirizine (ZYRTEC) 10 mg tablet Take 1 Tab by mouth daily. 90 Tab 1    albuterol (PROVENTIL HFA, VENTOLIN HFA, PROAIR HFA) 90 mcg/actuation inhaler Take 2 Puffs by inhalation every six (6) hours as needed for Wheezing.  3 Inhaler 2    fluticasone-salmeterol (ADVAIR) 100-50 mcg/dose diskus inhaler Take 1 Puff by inhalation every twelve (12) hours. 3 Inhaler 3     No current facility-administered medications on file prior to visit. No Known Allergies    Objective:  Visit Vitals    /65 (BP 1 Location: Left arm, BP Patient Position: Sitting)    Pulse 60    Temp 98.5 °F (36.9 °C) (Oral)    Resp 18    Ht 5' 9\" (1.753 m)    Wt 185 lb (83.9 kg)    SpO2 97%    BMI 27.32 kg/m2    Body mass index is 27.32 kg/(m^2). Assessment of cognitive impairment: Alert and oriented x 3    Depression Screen:   PHQ over the last two weeks 7/13/2018   Little interest or pleasure in doing things Not at all   Feeling down, depressed or hopeless Not at all   Total Score PHQ 2 0       Fall Risk Assessment:    Fall Risk Assessment, last 12 mths 7/13/2018   Able to walk? Yes   Fall in past 12 months? No       Functional Ability:   Does the patient exhibit a steady gait? yes   How long did it take the patient to get up and walk from a sitting position? <10 seconds   Is the patient self reliant?  (ie can do own laundry, meals, household chores)  Requires or depends on his wife for some daily chores - meal prep     Does the patient handle his/her own medications? yes     Does the patient handle his/her own money? yes     Is the patients home safe (ie good lighting, handrails on stairs and bath, etc.)? yes     Did you notice or did patient express any hearing difficulties? Yes - has been referred to ENT     Did you notice or did patient express any vision difficulties?   no     Were distance and reading eye charts used? yes       Advance Care Planning:   Patient was offered the opportunity to discuss advance care planning:  yes     Does patient have an Advance Directive:  no   If no, did you provide information on Caring Connections?   yes       Plan:      Orders Placed This Encounter    METABOLIC PANEL, COMPREHENSIVE    LIPID PANEL    CBC WITH AUTOMATED DIFF    HEMOGLOBIN A1C WITH EAG    IRON PROFILE    COLLECTION VENOUS BLOOD,VENIPUNCTURE    tamsulosin (FLOMAX) 0.4 mg capsule    amLODIPine (NORVASC) 5 mg tablet    losartan (COZAAR) 50 mg tablet    metoprolol tartrate (LOPRESSOR) 25 mg tablet    SITagliptin (JANUVIA) 50 mg tablet    therapeutic multivitamin (THERAGRAN) tablet    metFORMIN ER (GLUCOPHAGE XR) 500 mg tablet    atorvastatin (LIPITOR) 10 mg tablet    sildenafil citrate (VIAGRA) 100 mg tablet       Health Maintenance   Topic Date Due    Influenza Age 5 to Adult  08/01/2018    EYE EXAM RETINAL OR DILATED Q1  08/30/2018    MICROALBUMIN Q1  09/05/2018    HEMOGLOBIN A1C Q6M  01/13/2019    LIPID PANEL Q1  01/18/2019    FOOT EXAM Q1  02/21/2019    MEDICARE YEARLY EXAM  07/14/2019    GLAUCOMA SCREENING Q2Y  08/30/2019    COLONOSCOPY  08/27/2025    DTaP/Tdap/Td series (3 - Td) 08/24/2026    Hepatitis C Screening  Completed    ZOSTER VACCINE AGE 60>  Completed    Pneumococcal 65+ Low/Medium Risk  Addressed       *Patient verbalized understanding and agreement with the plan. A copy of the After Visit Summary with personalized health plan was given to the patient today.

## 2018-07-14 LAB
EST. AVERAGE GLUCOSE BLD GHB EST-MCNC: 103 MG/DL
HBA1C MFR BLD: 5.2 % (ref 4.2–5.6)

## 2018-10-07 DIAGNOSIS — Z91.09 ENVIRONMENTAL ALLERGIES: ICD-10-CM

## 2018-10-07 DIAGNOSIS — N52.9 ERECTILE DYSFUNCTION, UNSPECIFIED ERECTILE DYSFUNCTION TYPE: ICD-10-CM

## 2018-10-08 RX ORDER — SILDENAFIL 100 MG/1
100 TABLET, FILM COATED ORAL AS NEEDED
Qty: 12 TAB | Refills: 2 | Status: SHIPPED | OUTPATIENT
Start: 2018-10-08 | End: 2019-04-20 | Stop reason: SDUPTHER

## 2018-10-08 RX ORDER — CETIRIZINE HCL 10 MG
10 TABLET ORAL DAILY
Qty: 90 TAB | Refills: 1 | Status: SHIPPED | OUTPATIENT
Start: 2018-10-08 | End: 2019-04-20 | Stop reason: SDUPTHER

## 2018-10-08 NOTE — TELEPHONE ENCOUNTER
From: Roselia Shannon  To: Alden Ann PA-C  Sent: 10/7/2018 8:32 PM EDT  Subject: Medication Renewal Request    Original authorizing provider: DAFNE Acosta would like a refill of the following medications:  cetirizine (ZYRTEC) 10 mg tablet TEJAS Pettit  sildenafil citrate (VIAGRA) 100 mg tablet Alden Ann PA-C]    Preferred pharmacy: [facility]. [address]

## 2018-12-10 DIAGNOSIS — E11.9 TYPE 2 DIABETES MELLITUS WITHOUT COMPLICATION, WITHOUT LONG-TERM CURRENT USE OF INSULIN (HCC): ICD-10-CM

## 2018-12-10 DIAGNOSIS — I10 ESSENTIAL HYPERTENSION: ICD-10-CM

## 2018-12-12 DIAGNOSIS — I10 ESSENTIAL HYPERTENSION: ICD-10-CM

## 2018-12-12 DIAGNOSIS — E11.9 TYPE 2 DIABETES MELLITUS WITHOUT COMPLICATION, WITHOUT LONG-TERM CURRENT USE OF INSULIN (HCC): ICD-10-CM

## 2018-12-12 RX ORDER — METFORMIN HYDROCHLORIDE 500 MG/1
500 TABLET, EXTENDED RELEASE ORAL
Qty: 180 TAB | Refills: 1 | Status: CANCELLED | OUTPATIENT
Start: 2018-12-12

## 2018-12-12 RX ORDER — METFORMIN HYDROCHLORIDE 500 MG/1
500 TABLET, EXTENDED RELEASE ORAL
Qty: 180 TAB | Refills: 0 | Status: SHIPPED | OUTPATIENT
Start: 2018-12-12 | End: 2019-01-25 | Stop reason: SDUPTHER

## 2018-12-12 RX ORDER — METOPROLOL TARTRATE 25 MG/1
25 TABLET, FILM COATED ORAL 2 TIMES DAILY
Qty: 180 TAB | Refills: 1 | Status: CANCELLED | OUTPATIENT
Start: 2018-12-12

## 2018-12-12 RX ORDER — METOPROLOL TARTRATE 25 MG/1
25 TABLET, FILM COATED ORAL 2 TIMES DAILY
Qty: 180 TAB | Refills: 0 | Status: SHIPPED | OUTPATIENT
Start: 2018-12-12 | End: 2019-03-24 | Stop reason: SDUPTHER

## 2019-01-20 DIAGNOSIS — R05.9 COUGH: ICD-10-CM

## 2019-01-21 RX ORDER — ALBUTEROL SULFATE 90 UG/1
2 AEROSOL, METERED RESPIRATORY (INHALATION)
Qty: 3 INHALER | Refills: 2 | Status: SHIPPED | OUTPATIENT
Start: 2019-01-21 | End: 2019-06-25 | Stop reason: SDUPTHER

## 2019-01-25 DIAGNOSIS — E11.9 TYPE 2 DIABETES MELLITUS WITHOUT COMPLICATION, WITHOUT LONG-TERM CURRENT USE OF INSULIN (HCC): ICD-10-CM

## 2019-01-25 RX ORDER — METFORMIN HYDROCHLORIDE 500 MG/1
TABLET, EXTENDED RELEASE ORAL
Qty: 180 TAB | Refills: 1 | Status: SHIPPED | OUTPATIENT
Start: 2019-01-25 | End: 2019-06-25 | Stop reason: SDUPTHER

## 2019-01-28 ENCOUNTER — OFFICE VISIT (OUTPATIENT)
Dept: INTERNAL MEDICINE CLINIC | Age: 69
End: 2019-01-28

## 2019-01-28 VITALS
HEIGHT: 69 IN | TEMPERATURE: 97.9 F | WEIGHT: 207 LBS | HEART RATE: 78 BPM | BODY MASS INDEX: 30.66 KG/M2 | SYSTOLIC BLOOD PRESSURE: 138 MMHG | OXYGEN SATURATION: 97 % | DIASTOLIC BLOOD PRESSURE: 64 MMHG | RESPIRATION RATE: 18 BRPM

## 2019-01-28 DIAGNOSIS — M20.42 HAMMER TOES OF BOTH FEET: ICD-10-CM

## 2019-01-28 DIAGNOSIS — M20.22 HALLUX RIGIDUS OF BOTH FEET: ICD-10-CM

## 2019-01-28 DIAGNOSIS — M20.21 HALLUX RIGIDUS OF BOTH FEET: ICD-10-CM

## 2019-01-28 DIAGNOSIS — M20.41 HAMMER TOES OF BOTH FEET: ICD-10-CM

## 2019-01-28 DIAGNOSIS — L84 FOOT CALLUS: ICD-10-CM

## 2019-01-28 DIAGNOSIS — E11.9 TYPE 2 DIABETES MELLITUS WITHOUT COMPLICATION, WITHOUT LONG-TERM CURRENT USE OF INSULIN (HCC): Primary | ICD-10-CM

## 2019-01-28 NOTE — PROGRESS NOTES
Chief Complaint Patient presents with  Diabetes Diabetic shoe paperwork 1. Have you been to the ER, urgent care clinic since your last visit? Hospitalized since your last visit? No 
 
2. Have you seen or consulted any other health care providers outside of the [facility] since your last visit? Include any pap smears or colon screening.  No

## 2019-03-24 DIAGNOSIS — L21.0 DANDRUFF: ICD-10-CM

## 2019-03-24 DIAGNOSIS — I10 ESSENTIAL HYPERTENSION: ICD-10-CM

## 2019-03-26 RX ORDER — KETOCONAZOLE 20 MG/ML
SHAMPOO TOPICAL
Qty: 3 BOTTLE | Refills: 11 | Status: SHIPPED | OUTPATIENT
Start: 2019-03-26 | End: 2019-06-25 | Stop reason: SDUPTHER

## 2019-03-26 RX ORDER — THERA TABS 400 MCG
1 TAB ORAL DAILY
Qty: 100 TAB | Refills: 2 | Status: SHIPPED | OUTPATIENT
Start: 2019-03-26 | End: 2020-01-16 | Stop reason: ALTCHOICE

## 2019-03-26 RX ORDER — METOPROLOL TARTRATE 25 MG/1
25 TABLET, FILM COATED ORAL 2 TIMES DAILY
Qty: 180 TAB | Refills: 0 | Status: SHIPPED | OUTPATIENT
Start: 2019-03-26 | End: 2019-06-25 | Stop reason: SDUPTHER

## 2019-06-25 ENCOUNTER — HOSPITAL ENCOUNTER (OUTPATIENT)
Dept: LAB | Age: 69
Discharge: HOME | End: 2019-06-25
Payer: MEDICARE

## 2019-06-25 ENCOUNTER — OFFICE VISIT (OUTPATIENT)
Dept: INTERNAL MEDICINE CLINIC | Age: 69
End: 2019-06-25

## 2019-06-25 VITALS
BODY MASS INDEX: 31.4 KG/M2 | HEART RATE: 58 BPM | OXYGEN SATURATION: 97 % | WEIGHT: 212 LBS | RESPIRATION RATE: 18 BRPM | SYSTOLIC BLOOD PRESSURE: 130 MMHG | HEIGHT: 69 IN | TEMPERATURE: 98.6 F | DIASTOLIC BLOOD PRESSURE: 67 MMHG

## 2019-06-25 DIAGNOSIS — E11.9 TYPE 2 DIABETES MELLITUS WITHOUT COMPLICATION, WITHOUT LONG-TERM CURRENT USE OF INSULIN (HCC): Primary | ICD-10-CM

## 2019-06-25 DIAGNOSIS — H91.93 BILATERAL HEARING LOSS, UNSPECIFIED HEARING LOSS TYPE: ICD-10-CM

## 2019-06-25 DIAGNOSIS — Z12.5 PROSTATE CANCER SCREENING: ICD-10-CM

## 2019-06-25 DIAGNOSIS — E11.9 TYPE 2 DIABETES MELLITUS WITHOUT COMPLICATION, WITHOUT LONG-TERM CURRENT USE OF INSULIN (HCC): ICD-10-CM

## 2019-06-25 DIAGNOSIS — R35.1 NOCTURIA: ICD-10-CM

## 2019-06-25 DIAGNOSIS — H61.23 BILATERAL IMPACTED CERUMEN: ICD-10-CM

## 2019-06-25 DIAGNOSIS — E78.00 HYPERCHOLESTEREMIA: ICD-10-CM

## 2019-06-25 DIAGNOSIS — L21.0 DANDRUFF: ICD-10-CM

## 2019-06-25 DIAGNOSIS — N52.9 ERECTILE DYSFUNCTION, UNSPECIFIED ERECTILE DYSFUNCTION TYPE: ICD-10-CM

## 2019-06-25 DIAGNOSIS — I10 ESSENTIAL HYPERTENSION: ICD-10-CM

## 2019-06-25 DIAGNOSIS — R60.9 EDEMA, UNSPECIFIED TYPE: ICD-10-CM

## 2019-06-25 DIAGNOSIS — Z91.09 ENVIRONMENTAL ALLERGIES: ICD-10-CM

## 2019-06-25 DIAGNOSIS — R05.9 COUGH: ICD-10-CM

## 2019-06-25 LAB
EST. AVERAGE GLUCOSE BLD GHB EST-MCNC: 126 MG/DL
HBA1C MFR BLD: 6 % (ref 4.2–5.6)

## 2019-06-25 PROCEDURE — 80053 COMPREHEN METABOLIC PANEL: CPT

## 2019-06-25 PROCEDURE — 80061 LIPID PANEL: CPT

## 2019-06-25 PROCEDURE — 82043 UR ALBUMIN QUANTITATIVE: CPT

## 2019-06-25 PROCEDURE — 83036 HEMOGLOBIN GLYCOSYLATED A1C: CPT

## 2019-06-25 PROCEDURE — 84153 ASSAY OF PSA TOTAL: CPT

## 2019-06-25 PROCEDURE — 36415 COLL VENOUS BLD VENIPUNCTURE: CPT

## 2019-06-25 RX ORDER — FLUTICASONE PROPIONATE AND SALMETEROL 100; 50 UG/1; UG/1
1 POWDER RESPIRATORY (INHALATION) EVERY 12 HOURS
Qty: 3 INHALER | Refills: 1 | Status: SHIPPED | OUTPATIENT
Start: 2019-06-25 | End: 2020-01-16 | Stop reason: ALTCHOICE

## 2019-06-25 RX ORDER — LOSARTAN POTASSIUM 50 MG/1
50 TABLET ORAL DAILY
Qty: 90 TAB | Refills: 1 | Status: SHIPPED | OUTPATIENT
Start: 2019-06-25 | End: 2019-12-30 | Stop reason: SDUPTHER

## 2019-06-25 RX ORDER — METOPROLOL TARTRATE 25 MG/1
25 TABLET, FILM COATED ORAL 2 TIMES DAILY
Qty: 180 TAB | Refills: 1 | Status: SHIPPED | OUTPATIENT
Start: 2019-06-25 | End: 2019-12-30 | Stop reason: SDUPTHER

## 2019-06-25 RX ORDER — KETOCONAZOLE 20 MG/ML
SHAMPOO TOPICAL
Qty: 3 BOTTLE | Refills: 1 | Status: SHIPPED | OUTPATIENT
Start: 2019-06-25 | End: 2020-07-20 | Stop reason: ALTCHOICE

## 2019-06-25 RX ORDER — SILDENAFIL 100 MG/1
100 TABLET, FILM COATED ORAL AS NEEDED
Qty: 12 TAB | Refills: 1 | Status: SHIPPED | OUTPATIENT
Start: 2019-06-25

## 2019-06-25 RX ORDER — CETIRIZINE HCL 10 MG
10 TABLET ORAL DAILY
Qty: 90 TAB | Refills: 1 | Status: SHIPPED | OUTPATIENT
Start: 2019-06-25 | End: 2019-12-30 | Stop reason: SDUPTHER

## 2019-06-25 RX ORDER — AMLODIPINE BESYLATE 5 MG/1
5 TABLET ORAL DAILY
Qty: 90 TAB | Refills: 1 | Status: SHIPPED | OUTPATIENT
Start: 2019-06-25 | End: 2019-12-30 | Stop reason: SDUPTHER

## 2019-06-25 RX ORDER — ALBUTEROL SULFATE 90 UG/1
2 AEROSOL, METERED RESPIRATORY (INHALATION)
Qty: 3 INHALER | Refills: 1 | Status: SHIPPED | OUTPATIENT
Start: 2019-06-25 | End: 2020-07-20 | Stop reason: ALTCHOICE

## 2019-06-25 RX ORDER — ATORVASTATIN CALCIUM 10 MG/1
10 TABLET, FILM COATED ORAL DAILY
Qty: 90 TAB | Refills: 1 | Status: SHIPPED | OUTPATIENT
Start: 2019-06-25 | End: 2019-12-30 | Stop reason: SDUPTHER

## 2019-06-25 RX ORDER — HYDROCHLOROTHIAZIDE 25 MG/1
25 TABLET ORAL DAILY
Qty: 90 TAB | Refills: 1 | Status: SHIPPED | OUTPATIENT
Start: 2019-06-25 | End: 2020-01-16 | Stop reason: ALTCHOICE

## 2019-06-25 RX ORDER — TAMSULOSIN HYDROCHLORIDE 0.4 MG/1
0.4 CAPSULE ORAL DAILY
Qty: 90 CAP | Refills: 1 | Status: SHIPPED | OUTPATIENT
Start: 2019-06-25 | End: 2022-03-23

## 2019-06-25 RX ORDER — METFORMIN HYDROCHLORIDE 500 MG/1
TABLET, EXTENDED RELEASE ORAL
Qty: 180 TAB | Refills: 1 | Status: SHIPPED | OUTPATIENT
Start: 2019-06-25 | End: 2019-12-30 | Stop reason: SDUPTHER

## 2019-06-25 NOTE — PROGRESS NOTES
HISTORY OF PRESENT ILLNESS  Bernard Og is a 77 y.o. male. HPI Bernard Og is here for follow up on diabetes, HTN and hypercholesterolemia. He rarely checks his sugar, but when his wife pushes him to check it, it was 121. He has followed up with pulmonology for sleep apnea. He sees GI for fatty liver/elevated liver enzymes. He sees ortho for back pain. He has decreased hearing, but feels it has worsened. Review of Systems   Constitutional: Negative. HENT: Positive for hearing loss. Eyes: Negative. Respiratory: Negative. Cardiovascular: Positive for leg swelling (he has had some increase in leg swelling. He had previously been on hctz). Gastrointestinal: Negative. Musculoskeletal: Positive for back pain. Skin:        +dandruff of scalp. Requesting refill on nizoral shampoo   Neurological: Negative. Psychiatric/Behavioral: Negative. Physical Exam   Constitutional: He is oriented to person, place, and time. He appears well-developed and well-nourished. No distress. HENT:   Head: Normocephalic and atraumatic. Bilateral cerumen impaction. Ears irrigated with removal of cerumen and improvement of hearing   Eyes: Conjunctivae are normal.   Cardiovascular: Normal rate and regular rhythm. Pulmonary/Chest: Effort normal and breath sounds normal.   Musculoskeletal: He exhibits edema (will restart hctz. He can take prn. ). Neurological: He is alert and oriented to person, place, and time. Psychiatric: He has a normal mood and affect.  His behavior is normal. Judgment and thought content normal.     Visit Vitals  /67 (BP 1 Location: Left arm, BP Patient Position: Sitting)   Pulse (!) 58   Temp 98.6 °F (37 °C) (Oral)   Resp 18   Ht 5' 9\" (1.753 m)   Wt 212 lb (96.2 kg)   SpO2 97%   BMI 31.31 kg/m²     Wt Readings from Last 3 Encounters:   06/25/19 212 lb (96.2 kg)   01/28/19 207 lb (93.9 kg)   07/13/18 185 lb (83.9 kg)       ASSESSMENT and PLAN ICD-10-CM ICD-9-CM    1. Type 2 diabetes mellitus without complication, without long-term current use of insulin ([facility]) E11.9 250.00 metFORMIN ER (GLUCOPHAGE XR) 500 mg tablet      SITagliptin (JANUVIA) 50 mg tablet      METABOLIC PANEL, COMPREHENSIVE      LIPID PANEL      HEMOGLOBIN A1C WITH EAG      MICROALBUMIN, UR, RAND W/ MICROALB/CREAT RATIO      HM DIABETES FOOT EXAM   2. Essential hypertension I10 401.9 amLODIPine (NORVASC) 5 mg tablet      losartan (COZAAR) 50 mg tablet      metoprolol tartrate (LOPRESSOR) 25 mg tablet   3. Hypercholesteremia E78.00 272.0 atorvastatin (LIPITOR) 10 mg tablet   4. Environmental allergies Z91.09 V15.09 cetirizine (ZYRTEC) 10 mg tablet   5. Erectile dysfunction, unspecified erectile dysfunction type N52.9 607.84 sildenafil citrate (VIAGRA) 100 mg tablet   6. Dandruff L21.0 690.18 ketoconazole (NIZORAL) 2 % shampoo   7. Nocturia R35.1 788.43 tamsulosin (FLOMAX) 0.4 mg capsule   8. Cough R05 786.2 fluticasone propion-salmeterol (ADVAIR/WIXELA) 100-50 mcg/dose diskus inhaler      albuterol (PROVENTIL HFA, VENTOLIN HFA, PROAIR HFA) 90 mcg/actuation inhaler   9. Prostate cancer screening Z12.5 V76.44 PSA SCREENING (SCREENING)   10. Edema, unspecified type R60.9 782.3 hydroCHLOROthiazide (HYDRODIURIL) 25 mg tablet   11. Bilateral hearing loss, unspecified hearing loss type H91.93 389.9 CA REMOVE IMPACTED EAR WAX   12. Bilateral impacted cerumen H61.23 380.4 CA REMOVE IMPACTED EAR WAX     Pt verbalized understanding of their condition and diagnoses, treatment plan,  as well as side effects of any new medications prescribed.

## 2019-06-25 NOTE — PROGRESS NOTES
Chief Complaint   Patient presents with    Diabetes    Cholesterol Problem    Hypertension    Ear Fullness     both     1. Have you been to the ER, urgent care clinic since your last visit? Hospitalized since your last visit? No    2. Have you seen or consulted any other health care providers outside of the [address] [place] [address] since your last visit? Include any pap smears or colon screening.  No

## 2019-06-26 LAB
ALBUMIN SERPL-MCNC: 4 G/DL (ref 3.4–5)
ALBUMIN/GLOB SERPL: 1.2 {RATIO} (ref 0.8–1.7)
ALP SERPL-CCNC: 77 U/L (ref 45–117)
ALT SERPL-CCNC: 41 U/L (ref 16–61)
ANION GAP SERPL CALC-SCNC: 7 MMOL/L (ref 3–18)
AST SERPL-CCNC: 20 U/L (ref 15–37)
BILIRUB SERPL-MCNC: 0.4 MG/DL (ref 0.2–1)
BUN SERPL-MCNC: 12 MG/DL (ref 7–18)
BUN/CREAT SERPL: 15 (ref 12–20)
CALCIUM SERPL-MCNC: 8.8 MG/DL (ref 8.5–10.1)
CHLORIDE SERPL-SCNC: 106 MMOL/L (ref 100–108)
CHOLEST SERPL-MCNC: 113 MG/DL
CO2 SERPL-SCNC: 29 MMOL/L (ref 21–32)
CREAT SERPL-MCNC: 0.79 MG/DL (ref 0.6–1.3)
CREAT UR-MCNC: 63 MG/DL (ref 30–125)
GLOBULIN SER CALC-MCNC: 3.4 G/DL (ref 2–4)
GLUCOSE SERPL-MCNC: 83 MG/DL (ref 74–99)
HDLC SERPL-MCNC: 48 MG/DL (ref 40–60)
HDLC SERPL: 2.4 {RATIO} (ref 0–5)
LDLC SERPL CALC-MCNC: 44.4 MG/DL (ref 0–100)
LIPID PROFILE,FLP: NORMAL
MICROALBUMIN UR-MCNC: <0.5 MG/DL (ref 0–3)
MICROALBUMIN/CREAT UR-RTO: NORMAL MG/G (ref 0–30)
POTASSIUM SERPL-SCNC: 4.1 MMOL/L (ref 3.5–5.5)
PROT SERPL-MCNC: 7.4 G/DL (ref 6.4–8.2)
PSA SERPL-MCNC: 1.1 NG/ML (ref 0–4)
SODIUM SERPL-SCNC: 142 MMOL/L (ref 136–145)
TRIGL SERPL-MCNC: 103 MG/DL (ref ?–150)
VLDLC SERPL CALC-MCNC: 20.6 MG/DL

## 2019-06-30 PROBLEM — G47.33 OBSTRUCTIVE SLEEP APNEA: Status: ACTIVE | Noted: 2019-06-30

## 2019-07-01 ENCOUNTER — OFFICE VISIT (OUTPATIENT)
Dept: INTERNAL MEDICINE CLINIC | Age: 69
End: 2019-07-01

## 2019-07-01 DIAGNOSIS — Z00.00 MEDICARE ANNUAL WELLNESS VISIT, SUBSEQUENT: Primary | ICD-10-CM

## 2019-07-01 NOTE — PATIENT INSTRUCTIONS
Type 2 Diabetes: Care Instructions Your Care Instructions Type 2 diabetes is a disease that develops when the body's tissues cannot use insulin properly. Over time, the pancreas cannot make enough insulin. Insulin is a hormone that helps the body's cells use sugar (glucose) for energy. It also helps the body store extra sugar in muscle, fat, and liver cells. Without insulin, the sugar cannot get into the cells to do its work. It stays in the blood instead. This can cause high blood sugar levels. A person has diabetes when the blood sugar stays too high too much of the time. Over time, diabetes can lead to diseases of the heart, blood vessels, nerves, kidneys, and eyes. You may be able to control your blood sugar by losing weight, eating a healthy diet, and getting daily exercise. You may also have to take insulin or other diabetes medicine. Follow-up care is a key part of your treatment and safety. Be sure to make and go to all appointments. Call your doctor if you are having problems. It's also a good idea to know your test results and keep a list of the medicines you take. How can you care for yourself at home? · Keep your blood sugar at a target level (which you set with your doctor). ? Eat a good diet that spreads carbohydrate throughout the day. Carbohydratethe body's main source of fuelaffects blood sugar more than any other nutrient. Carbohydrate is in fruits, vegetables, milk, and yogurt. It also is in breads, cereals, vegetables such as potatoes and corn, and sugary foods such as candy and cakes. ? Aim for 30 minutes of exercise on most, preferably all, days of the week. Walking is a good choice. You also may want to do other activities, such as running, swimming, cycling, or playing tennis or team sports. If your doctor says it's okay, do muscle-strengthening exercises at least 2 times a week. ? Take your medicines exactly as prescribed.  Call your doctor if you think you are having a problem with your medicine. You will get more details on the specific medicines your doctor prescribes. · Check your blood sugar as often as your doctor recommends. It is important to keep track of any symptoms you have, such as low blood sugar. Also tell your doctor if you have any changes in your activities, diet, or insulin use. · Talk to your doctor before you start taking aspirin every day. Aspirin can help certain people lower their risk of a heart attack or stroke. But taking aspirin isn't right for everyone, because it can cause serious bleeding. · Do not smoke. If you need help quitting, talk to your doctor about stop-smoking programs and medicines. These can increase your chances of quitting for good. · Keep your cholesterol and blood pressure at normal levels. You may need to take one or more medicines to reach your goals. Take them exactly as directed. Do not stop or change a medicine without talking to your doctor first. 
When should you call for help? Call 911 anytime you think you may need emergency care. For example, call if: 
  · You passed out (lost consciousness), or you suddenly become very sleepy or confused. (You may have very low blood sugar.)  
 Call your doctor now or seek immediate medical care if: 
  · Your blood sugar is 300 mg/dL or is higher than the level your doctor has set for you.  
  · You have symptoms of low blood sugar, such as: ? Sweating. ? Feeling nervous, shaky, and weak. ? Extreme hunger and slight nausea. ? Dizziness and headache. 
? Blurred vision. ? Confusion.  
 Watch closely for changes in your health, and be sure to contact your doctor if: 
  · You often have problems controlling your blood sugar.  
  · You have symptoms of long-term diabetes problems, such as: ? New vision changes. ? New pain, numbness, or tingling in your hands or feet. ? Skin problems. Where can you learn more? Go to http://shivani-taylor.info/. Enter C553 in the search box to learn more about \"Type 2 Diabetes: Care Instructions. \" Current as of: July 25, 2018 Content Version: 11.9 © 4157-1815 [employer]. Care instructions adapted under license by [employer] (which disclaims liability or warranty for this information). If you have questions about a medical condition or this instruction, always ask your healthcare professional. [place] [address] any warranty or liability for your use of this information.

## 2019-07-01 NOTE — PROGRESS NOTES
This is the Subsequent Medicare Annual Wellness Exam, performed 12 months or more after the Initial AWV or the last Subsequent AWV    I have reviewed the patient's medical history in detail and updated the computerized patient record. History     Past Medical History:   Diagnosis Date    Arthritis     Asthma     Chronic pain     Degenerative disc disease, lumbar     Diabetes ([facility]     Hypertension     Intracranial hemorrhage, cerebellar ([facility] 2018    Spondylosis of lumbar spine       Past Surgical History:   Procedure Laterality Date    COLONOSCOPY  10/13/2015 Return 10/14/2025    Dr. Alfie Bashir; screening    HX COLONOSCOPY      HX KNEE ARTHROSCOPY      right knee     Current Outpatient Medications   Medication Sig Dispense Refill    amLODIPine (NORVASC) 5 mg tablet Take 1 Tab by mouth daily. 90 Tab 1    atorvastatin (LIPITOR) 10 mg tablet Take 1 Tab by mouth daily. 90 Tab 1    losartan (COZAAR) 50 mg tablet Take 1 Tab by mouth daily. 90 Tab 1    cetirizine (ZYRTEC) 10 mg tablet Take 1 Tab by mouth daily. 90 Tab 1    sildenafil citrate (VIAGRA) 100 mg tablet Take 1 Tab by mouth as needed (E.D.). Once daily 12 Tab 1    ketoconazole (NIZORAL) 2 % shampoo Shampoo daily, lather and let sit for 5 minutes, then rinse  Indications: Dandruff 3 Bottle 1    metoprolol tartrate (LOPRESSOR) 25 mg tablet Take 1 Tab by mouth two (2) times a day. 180 Tab 1    tamsulosin (FLOMAX) 0.4 mg capsule Take 1 Cap by mouth daily. 90 Cap 1    metFORMIN ER (GLUCOPHAGE XR) 500 mg tablet Take 2 tabs po daily with a meal 180 Tab 1    SITagliptin (JANUVIA) 50 mg tablet Take 1 Tab by mouth daily. 90 Tab 1    fluticasone propion-salmeterol (ADVAIR/WIXELA) 100-50 mcg/dose diskus inhaler Take 1 Puff by inhalation every twelve (12) hours. 3 Inhaler 1    albuterol (PROVENTIL HFA, VENTOLIN HFA, PROAIR HFA) 90 mcg/actuation inhaler Take 2 Puffs by inhalation every six (6) hours as needed for Wheezing.  3 Inhaler 1    hydroCHLOROthiazide (HYDRODIURIL) 25 mg tablet Take 1 Tab by mouth daily. If needed for swelling 90 Tab 1    therapeutic multivitamin (THERAGRAN) tablet Take 1 Tab by mouth daily. 100 Tab 2    docusate sodium (COLACE) 100 mg capsule Take 100 mg by mouth two (2) times a day.  famotidine (PEPCID) 20 mg tablet Take 20 mg by mouth two (2) times a day.  melatonin 3 mg tablet Take  by mouth. No Known Allergies  Family History   Problem Relation Age of Onset    No Known Problems Mother     No Known Problems Father      Social History     Tobacco Use    Smoking status: Never Smoker    Smokeless tobacco: Never Used   Substance Use Topics    Alcohol use: Yes     Alcohol/week: 1.0 oz     Types: 2 Cans of beer per week     Patient Active Problem List   Diagnosis Code    Advance directive discussed with patient Z71.89    Type 2 diabetes mellitus without complication, without long-term current use of insulin [facility] E11.9    Essential hypertension I10    Hallux rigidus of both feet M20.21, M20.22    Hammer toes of both feet M20.41, M20.42    Foot callus L84    Obstructive sleep apnea G47.33       Depression Risk Factor Screening:     3 most recent PHQ Screens 6/25/2019   Little interest or pleasure in doing things Not at all   Feeling down, depressed, irritable, or hopeless Not at all   Total Score PHQ 2 0     Alcohol Risk Factor Screening: You do not drink alcohol or very rarely. Functional Ability and Level of Safety:   Hearing Loss  Hearing is good. Activities of Daily Living  The home contains: no safety equipment. Patient does total self care    Fall Risk  Fall Risk Assessment, last 12 mths 6/25/2019   Able to walk? Yes   Fall in past 12 months?  No     3 most recent PHQ Screens 6/25/2019   Little interest or pleasure in doing things Not at all   Feeling down, depressed, irritable, or hopeless Not at all   Total Score PHQ 2 0       Abuse Screen  Patient is not abused    Cognitive Screening Evaluation of Cognitive Function:  Has your family/caregiver stated any concerns about your memory: no  Normal    Patient Care Team   Patient Care Team:  Madi Armando PA-C as PCP - General (Family Practice)  Emilia Arias MD as Physician (Colon and Rectal Surgery)    Assessment/Plan   Education and counseling provided:  Are appropriate based on today's review and evaluation    Diagnoses and all orders for this visit:    1.  Medicare annual wellness visit, subsequent        Health Maintenance Due   Topic Date Due    Shingrix Vaccine Age 50> (1 of 2) 06/06/2001    FOOT EXAM Q1  02/21/2019    MEDICARE YEARLY EXAM  07/14/2019    GLAUCOMA SCREENING Q2Y  08/30/2019

## 2019-07-01 NOTE — PATIENT INSTRUCTIONS
Medicare Wellness Visit, Male The best way to live healthy is to have a lifestyle where you eat a well-balanced diet, exercise regularly, limit alcohol use, and quit all forms of tobacco/nicotine, if applicable. Regular preventive services are another way to keep healthy. Preventive services (vaccines, screening tests, monitoring & exams) can help personalize your care plan, which helps you manage your own care. Screening tests can find health problems at the earliest stages, when they are easiest to treat. [address] [facility] follows the current, evidence-based guidelines published by the [facility] ([facility]) when recommending preventive services for our patients. Because we follow these guidelines, sometimes recommendations change over time as research supports it. (For example, a prostate screening blood test is no longer routinely recommended for men with no symptoms.) Of course, you and your doctor may decide to screen more often for some diseases, based on your risk and co-morbidities (chronic disease you are already diagnosed with). Preventive services for you include: - Medicare offers their members a free annual wellness visit, which is time for you and your primary care provider to discuss and plan for your preventive service needs. Take advantage of this benefit every year! 
-All adults over age 72 should receive the recommended pneumonia vaccines. Current USPSTF guidelines recommend a series of two vaccines for the best pneumonia protection.  
-All adults should have a flu vaccine yearly and an ECG.  All adults age 61 and older should receive a shingles vaccine once in their lifetime.   
-All adults age 38-68 who are overweight should have a diabetes screening test once every three years.  
-Other screening tests & preventive services for persons with diabetes include: an eye exam to screen for diabetic retinopathy, a kidney function test, a foot exam, and stricter control over your cholesterol.  
-Cardiovascular screening for adults with routine risk involves an electrocardiogram (ECG) at intervals determined by the provider.  
-Colorectal cancer screening should be done for adults age 54-65 with no increased risk factors for colorectal cancer. There are a number of acceptable methods of screening for this type of cancer. Each test has its own benefits and drawbacks. Discuss with your provider what is most appropriate for you during your annual wellness visit. The different tests include: colonoscopy (considered the best screening method), a fecal occult blood test, a fecal DNA test, and sigmoidoscopy. 
-All adults born between [facility] should be screened once for Hepatitis C. 
-An Abdominal Aortic Aneurysm (AAA) Screening is recommended for men age 73-68 who has ever smoked in their lifetime. Here is a list of your current Health Maintenance items (your personalized list of preventive services) with a due date: 
Health Maintenance Due Topic Date Due  Shingles Vaccine (1 of 2) 06/06/2001 [facility] Diabetic Foot Care  02/21/2019 [facility] Annual Well Visit  07/14/2019  Glaucoma Screening   08/30/2019

## 2019-09-24 ENCOUNTER — CLINICAL SUPPORT (OUTPATIENT)
Dept: INTERNAL MEDICINE CLINIC | Age: 69
End: 2019-09-24

## 2019-09-24 DIAGNOSIS — Z23 ENCOUNTER FOR IMMUNIZATION: ICD-10-CM

## 2019-09-24 NOTE — PROGRESS NOTES
Mike Mancilla is a 77 y.o. male who presents for routine immunizations. He denies any symptoms , reactions or allergies that would exclude them from being immunized today. Risks and adverse reactions were discussed and the VIS was given to them. All questions were addressed. He was observed for 15 min post injection. There were no reactions observed.     Cindra Grumbling

## 2019-09-24 NOTE — PATIENT INSTRUCTIONS
Vaccine Information Statement    Influenza (Flu) Vaccine (Live, Intranasal): What You Need to Know    Many Vaccine Information Statements are available in Nepali and other languages. See www.immunize.org/vis  Hojas de información sobre vacunas están disponibles en español y en muchos otros idiomas. Visite www.immunize.org/vis    1. Why get vaccinated? Influenza vaccine can prevent influenza (flu). Flu is a contagious disease that spreads around the [place] [facility] every year, usually between October and May. Anyone can get the flu, but it is more dangerous for some people. Infants and young children, people 72years of age and older, pregnant women, and people with certain health conditions or a weakened immune system are at greatest risk of flu complications. Pneumonia, bronchitis, sinus infections and ear infections are examples of flu-related complications. If you have a medical condition, such as heart disease, cancer or diabetes, flu can make it worse. Flu can cause fever and chills, sore throat, muscle aches, fatigue, cough, headache, and runny or stuffy nose. Some people may have vomiting and diarrhea, though this is more common in children than adults. Each year thousands of people in the [facility] die from flu, and many more are hospitalized. Flu vaccine prevents millions of illnesses and flu-related visits to the doctor each year. 2. Live, attenuated influenza vaccine     CDC recommends everyone 10months of age and older get vaccinated every flu season. Children 6 months through 6years of age may need 2 doses during a single flu season. Everyone else needs only 1 dose each flu season. Live, attenuated influenza vaccine (called LAIV) is a nasal spray vaccine that may be given to non-pregnant people 2 through 52years of age. It takes about 2 weeks for protection to develop after vaccination. There are many flu viruses, and they are always changing.  Each year a new flu vaccine is made to protect against three or four viruses that are likely to cause disease in the upcoming flu season. Even when the vaccine doesnt exactly match these viruses, it may still provide some protection. Influenza vaccine does not cause flu. Influenza vaccine may be given at the same time as other vaccines. 3. Talk with your health care provider    Tell your vaccine provider if the person getting the vaccine:   Is younger than 2 years or older than 52years of age.  Is pregnant.  Has had an allergic reaction after a previous dose of influenza vaccine, or has any severe, life-threatening allergies.  Is a child or adolescent 2 through 16years of age who is receiving aspirin or aspirin-containing products.  Has a weakened immune system.  Is a child 3through 3years old who has asthma or a history of wheezing in the past 12 months.  Has taken influenza antiviral medication in the previous 48 hours.  Cares for severely immunocompromised persons who require a protected environment.  Is 5 years or older and has asthma.  Has other underlying medical conditions that can put people at higher risk of serious flu complications (such as lung disease, heart disease, kidney disease, kidney or liver disorders, neurologic or neuromuscular or metabolic disorders).  Has had Guillain-Barré Syndrome within 6 weeks after a previous dose of influenza vaccine. In some cases, your health care provider may decide to postpone influenza vaccination to a future visit. For some patients, a different type of influenza vaccine (inactivated or recombinant influenza vaccine) might be more appropriate than live, attenuated influenza vaccine. People with minor illnesses, such as a cold, may be vaccinated. People who are moderately or severely ill should usually wait until they recover before getting influenza vaccine. Your health care provider can give you more information.     4. Risks of a vaccine reaction     Runny nose or nasal congestion, wheezing and headache can happen after LAIV.  Vomiting, muscle aches, fever, sore throat and cough are other possible side effects. If these problems occur, they usually begin soon after vaccination and are mild and short-lived. As with any medicine, there is a very remote chance of a vaccine causing a severe allergic reaction, other serious injury, or death. 5. What if there is a serious problem? An allergic reaction could occur after the vaccinated person leaves the clinic. If you see signs of a severe allergic reaction (hives, swelling of the face and throat, difficulty breathing, a fast heartbeat, dizziness, or weakness), call 9-1-1 and get the person to the nearest hospital.    For other signs that concern you, call your health care provider. Adverse reactions should be reported to the Vaccine Adverse Event Reporting System (VAERS). Your health care provider will usually file this report, or you can do it yourself. Visit the VAERS website at www.vaers. hhs.gov or call 8-953.897.1909. VAERS is only for reporting reactions, and VAERS staff do not give medical advice. 6. The National Vaccine Injury Compensation Program    The [facility] Vaccine Injury Compensation Program (VICP) is a federal program that was created to compensate people who may have been injured by certain vaccines. Visit the VICP website at www.hrsa.gov/vaccinecompensation or call 1-480.847.8880 to learn about the program and about filing a claim. There is a time limit to file a claim for compensation. 7. How can I learn more?  Ask your health care provider.  Call your local or state health department.  Contact the Centers for Disease Control and Prevention (CDC):  - Call 4-657.608.3526 (1-800-CDC-INFO) or  - Visit CDCs influenza website at www.cdc.gov/flu    Vaccine Information Statement (Interim)  Live Attenuated Influenza Vaccine   8/15/2019  [address] Part [id number]-74   Department of Health and Human Services  Centers for Disease Control and Prevention    Office Use Only

## 2019-11-14 ENCOUNTER — HOSPITAL ENCOUNTER (OUTPATIENT)
Dept: LAB | Age: 69
Discharge: HOME | End: 2019-11-14
Payer: MEDICARE

## 2019-11-14 LAB — HBA1C MFR BLD: 6.3 % (ref 4.2–5.6)

## 2019-11-14 PROCEDURE — 83036 HEMOGLOBIN GLYCOSYLATED A1C: CPT

## 2019-11-14 PROCEDURE — 36415 COLL VENOUS BLD VENIPUNCTURE: CPT

## 2019-12-30 ENCOUNTER — HOSPITAL ENCOUNTER (OUTPATIENT)
Dept: VASCULAR SURGERY | Age: 69
Discharge: HOME | End: 2019-12-30
Attending: ORTHOPAEDIC SURGERY
Payer: MEDICARE

## 2019-12-30 DIAGNOSIS — I10 ESSENTIAL HYPERTENSION: ICD-10-CM

## 2019-12-30 DIAGNOSIS — Z91.09 ENVIRONMENTAL ALLERGIES: ICD-10-CM

## 2019-12-30 DIAGNOSIS — M17.12 OSTEOARTHROSIS, LOCALIZED, PRIMARY, KNEE, LEFT: ICD-10-CM

## 2019-12-30 DIAGNOSIS — E11.9 TYPE 2 DIABETES MELLITUS WITHOUT COMPLICATION, WITHOUT LONG-TERM CURRENT USE OF INSULIN (HCC): ICD-10-CM

## 2019-12-30 DIAGNOSIS — E78.00 HYPERCHOLESTEREMIA: ICD-10-CM

## 2019-12-30 PROCEDURE — 93923 UPR/LXTR ART STDY 3+ LVLS: CPT

## 2019-12-31 LAB
LEFT ABI: 1.3
LEFT ANTERIOR TIBIAL: 161 MMHG
LEFT ARM BP: 130 MMHG
LEFT CALF PRESSURE: 165 MMHG
LEFT POSTERIOR TIBIAL: 169 MMHG
LEFT TBI: 1.85
LEFT TOE PRESSURE: 240 MMHG
RIGHT ABI: 1.32
RIGHT ANTERIOR TIBIAL: 156 MMHG
RIGHT ARM BP: 120 MMHG
RIGHT CALF PRESSURE: 183 MMHG
RIGHT POSTERIOR TIBIAL: 171 MMHG
RIGHT TBI: 1.85
RIGHT TOE PRESSURE: 240 MMHG

## 2020-01-02 RX ORDER — LOSARTAN POTASSIUM 50 MG/1
50 TABLET ORAL DAILY
Qty: 90 TAB | Refills: 0 | Status: SHIPPED | OUTPATIENT
Start: 2020-01-02 | End: 2020-04-28 | Stop reason: SDUPTHER

## 2020-01-02 RX ORDER — METFORMIN HYDROCHLORIDE 500 MG/1
TABLET, EXTENDED RELEASE ORAL
Qty: 180 TAB | Refills: 0 | Status: SHIPPED | OUTPATIENT
Start: 2020-01-02

## 2020-01-02 RX ORDER — CETIRIZINE HCL 10 MG
10 TABLET ORAL DAILY
Qty: 90 TAB | Refills: 0 | Status: SHIPPED | OUTPATIENT
Start: 2020-01-02

## 2020-01-02 RX ORDER — AMLODIPINE BESYLATE 5 MG/1
5 TABLET ORAL DAILY
Qty: 90 TAB | Refills: 0 | Status: SHIPPED | OUTPATIENT
Start: 2020-01-02 | End: 2020-04-28 | Stop reason: SDUPTHER

## 2020-01-02 RX ORDER — ATORVASTATIN CALCIUM 10 MG/1
10 TABLET, FILM COATED ORAL DAILY
Qty: 90 TAB | Refills: 0 | Status: SHIPPED | OUTPATIENT
Start: 2020-01-02 | End: 2020-04-28 | Stop reason: SDUPTHER

## 2020-01-02 RX ORDER — METOPROLOL TARTRATE 25 MG/1
25 TABLET, FILM COATED ORAL 2 TIMES DAILY
Qty: 180 TAB | Refills: 0 | Status: SHIPPED | OUTPATIENT
Start: 2020-01-02 | End: 2020-04-28 | Stop reason: SDUPTHER

## 2020-01-09 NOTE — PROGRESS NOTES
Subjective:  
Patient is a 80y.o. year old male who presents for Diabetes (Diabetic shoe paperwork) 1. Diabetes:  Controlled with Metformin. Also on Cozaar to protect kidneys and Lipitor for cholesterol. He is here today for diabetic shoes and has paperwork to be filled out. Has already seen Dr. Odessa Pinedo.  Gets shoes from [employer]. Dr. Odessa Pinedo is talking about doing surgery on the right foot for the callus. Review of Systems Constitutional: Negative. Neurological: Positive for tingling. Current Outpatient Medications on File Prior to Visit Medication Sig Dispense Refill  metFORMIN ER (GLUCOPHAGE XR) 500 mg tablet Take 2 tabs po daily with a meal 180 Tab 1  
 albuterol (PROVENTIL HFA, VENTOLIN HFA, PROAIR HFA) 90 mcg/actuation inhaler Take 2 Puffs by inhalation every six (6) hours as needed for Wheezing. 3 Inhaler 2  
 metoprolol tartrate (LOPRESSOR) 25 mg tablet Take 1 Tab by mouth two (2) times a day. 180 Tab 0  
 cetirizine (ZYRTEC) 10 mg tablet Take 1 Tab by mouth daily. 90 Tab 1  
 sildenafil citrate (VIAGRA) 100 mg tablet Take 1 Tab by mouth as needed. Once daily 12 Tab 2  
 tamsulosin (FLOMAX) 0.4 mg capsule Take 1 Cap by mouth daily. 90 Cap 1  
 amLODIPine (NORVASC) 5 mg tablet Take 1 Tab by mouth daily. 90 Tab 1  
 losartan (COZAAR) 50 mg tablet Take 1 Tab by mouth daily. 90 Tab 1  
 SITagliptin (JANUVIA) 50 mg tablet Take 1 Tab by mouth daily. 90 Tab 1  therapeutic multivitamin (THERAGRAN) tablet Take 1 Tab by mouth daily. 100 Tab 2  
 atorvastatin (LIPITOR) 10 mg tablet Take 1 Tab by mouth daily. 90 Tab 1  ketoconazole (NIZORAL) 2 % shampoo Shampoo daily, lather and let sit for 5 minutes, then rinse  Indications: Dandruff 3 Bottle 11  
 docusate sodium (COLACE) 100 mg capsule Take 100 mg by mouth two (2) times a day.  famotidine (PEPCID) 20 mg tablet Take 20 mg by mouth two (2) times a day.  melatonin 3 mg tablet Take  by mouth.  fluticasone-salmeterol (ADVAIR) 100-50 mcg/dose diskus inhaler Take 1 Puff by inhalation every twelve (12) hours. 3 Inhaler 3 No current facility-administered medications on file prior to visit. Reviewed PmHx, RxHx, FmHx, SocHx, AllgHx and updated and dated in the chart. Nurse notes were reviewed and are correct Objective:  
 
Vitals:  
 01/28/19 1020 BP: 138/64 Pulse: 78 Resp: 18 Temp: 97.9 °F (36.6 °C) TempSrc: Oral  
SpO2: 97% Weight: 207 lb (93.9 kg) Height: 5' 9\" (1.753 m) Physical Exam  
Constitutional: He appears well-developed and well-nourished. No distress. HENT:  
Head: Normocephalic and atraumatic. Eyes: Conjunctivae and EOM are normal.  
Neck: Normal range of motion. Neck supple. Cardiovascular: Normal rate, regular rhythm, normal heart sounds and intact distal pulses. Pulmonary/Chest: Effort normal and breath sounds normal.  
Skin: Skin is warm and dry. Psychiatric: He has a normal mood and affect. His behavior is normal. Judgment and thought content normal.  
Nursing note and vitals reviewed. Diabetic foot exam:  
 
Left: Reflexes 2+ Filament test 6/6 Pulse DP: 2+ (normal) Pulse PT: 2+ (normal) Deformities: calluses, hammer toes, hallux rigidus Right: Reflexes 2+ Filament test 5/6 Pulse DP: 2+ (normal) Pulse PT: 2+ (normal) Deformities: calluses, hammer toes, hallux rigidus Assessment/ Plan:  
 
Diagnoses and all orders for this visit: 
 
1. Type 2 diabetes mellitus without complication, without long-term current use of insulin [facility]):  He has active diabetes and is on medication for this. 2. Hallux rigidus of both feet 3. Hammer toes of both feet 4. Foot callus: Will be probably getting surgery on the right callus He is in need of new diabetic shoes. I filled out the paperwork and am sending it with him.  
  
I have discussed the diagnosis with the patient and the intended plan as seen in the above orders. The patient verbalized understanding and agrees with the plan. Follow-up Disposition: 
Return if symptoms worsen or fail to improve.  
 
Inocencio Lewis MD

## 2020-01-10 ENCOUNTER — HOSPITAL ENCOUNTER (OUTPATIENT)
Dept: GENERAL RADIOLOGY | Age: 70
Discharge: HOME | End: 2020-01-10
Payer: MEDICARE

## 2020-01-10 ENCOUNTER — HOSPITAL ENCOUNTER (OUTPATIENT)
Dept: PREADMISSION TESTING | Age: 70
Discharge: HOME | End: 2020-01-10
Payer: MEDICARE

## 2020-01-10 DIAGNOSIS — Z01.818 PRE-OP EXAM: ICD-10-CM

## 2020-01-10 LAB
ABO + RH BLD: NORMAL
ALBUMIN SERPL-MCNC: 3.8 G/DL (ref 3.4–5)
ALBUMIN/GLOB SERPL: 1.1 {RATIO} (ref 0.8–1.7)
ALP SERPL-CCNC: 72 U/L (ref 45–117)
ALT SERPL-CCNC: 34 U/L (ref 16–61)
ANION GAP SERPL CALC-SCNC: 4 MMOL/L (ref 3–18)
APPEARANCE UR: CLEAR
APTT PPP: 28.1 SEC (ref 23–36.4)
AST SERPL-CCNC: 16 U/L (ref 10–38)
ATRIAL RATE: 61 BPM
BILIRUB SERPL-MCNC: 0.4 MG/DL (ref 0.2–1)
BILIRUB UR QL: NEGATIVE
BLOOD GROUP ANTIBODIES SERPL: NORMAL
BUN SERPL-MCNC: 21 MG/DL (ref 7–18)
BUN/CREAT SERPL: 28 (ref 12–20)
CALCIUM SERPL-MCNC: 8.9 MG/DL (ref 8.5–10.1)
CALCULATED P AXIS, ECG09: 49 DEGREES
CALCULATED R AXIS, ECG10: -21 DEGREES
CALCULATED T AXIS, ECG11: 46 DEGREES
CHLORIDE SERPL-SCNC: 109 MMOL/L (ref 100–111)
CO2 SERPL-SCNC: 27 MMOL/L (ref 21–32)
COLOR UR: YELLOW
CREAT SERPL-MCNC: 0.74 MG/DL (ref 0.6–1.3)
DIAGNOSIS, 93000: NORMAL
ERYTHROCYTE [DISTWIDTH] IN BLOOD BY AUTOMATED COUNT: 12.7 % (ref 11.6–14.5)
GLOBULIN SER CALC-MCNC: 3.5 G/DL (ref 2–4)
GLUCOSE SERPL-MCNC: 145 MG/DL (ref 74–99)
GLUCOSE UR STRIP.AUTO-MCNC: >1000 MG/DL
HCT VFR BLD AUTO: 44.3 % (ref 36–48)
HGB BLD-MCNC: 15.2 G/DL (ref 13–16)
HGB UR QL STRIP: NEGATIVE
INR PPP: 0.9 (ref 0.8–1.2)
KETONES UR QL STRIP.AUTO: NEGATIVE MG/DL
LEUKOCYTE ESTERASE UR QL STRIP.AUTO: NEGATIVE
MCH RBC QN AUTO: 29.2 PG (ref 24–34)
MCHC RBC AUTO-ENTMCNC: 34.3 G/DL (ref 31–37)
MCV RBC AUTO: 85.2 FL (ref 74–97)
NITRITE UR QL STRIP.AUTO: NEGATIVE
P-R INTERVAL, ECG05: 204 MS
PH UR STRIP: 5.5 [PH] (ref 5–8)
PLATELET # BLD AUTO: 239 K/UL (ref 135–420)
PMV BLD AUTO: 9.7 FL (ref 9.2–11.8)
POTASSIUM SERPL-SCNC: 4.2 MMOL/L (ref 3.5–5.5)
PROT SERPL-MCNC: 7.3 G/DL (ref 6.4–8.2)
PROT UR STRIP-MCNC: NEGATIVE MG/DL
PROTHROMBIN TIME: 12.4 SEC (ref 11.5–15.2)
Q-T INTERVAL, ECG07: 386 MS
QRS DURATION, ECG06: 100 MS
QTC CALCULATION (BEZET), ECG08: 388 MS
RBC # BLD AUTO: 5.2 M/UL (ref 4.7–5.5)
SODIUM SERPL-SCNC: 140 MMOL/L (ref 136–145)
SP GR UR REFRACTOMETRY: 1.03 (ref 1–1.03)
SPECIMEN EXP DATE BLD: NORMAL
UROBILINOGEN UR QL STRIP.AUTO: 1 EU/DL (ref 0.2–1)
VENTRICULAR RATE, ECG03: 61 BPM
WBC # BLD AUTO: 9.3 K/UL (ref 4.6–13.2)

## 2020-01-10 PROCEDURE — 85027 COMPLETE CBC AUTOMATED: CPT

## 2020-01-10 PROCEDURE — 71046 X-RAY EXAM CHEST 2 VIEWS: CPT

## 2020-01-10 PROCEDURE — 85610 PROTHROMBIN TIME: CPT

## 2020-01-10 PROCEDURE — 93005 ELECTROCARDIOGRAM TRACING: CPT

## 2020-01-10 PROCEDURE — 36415 COLL VENOUS BLD VENIPUNCTURE: CPT

## 2020-01-10 PROCEDURE — 87086 URINE CULTURE/COLONY COUNT: CPT

## 2020-01-10 PROCEDURE — 85730 THROMBOPLASTIN TIME PARTIAL: CPT

## 2020-01-10 PROCEDURE — 81003 URINALYSIS AUTO W/O SCOPE: CPT

## 2020-01-10 PROCEDURE — 80053 COMPREHEN METABOLIC PANEL: CPT

## 2020-01-10 PROCEDURE — 86900 BLOOD TYPING SEROLOGIC ABO: CPT

## 2020-01-10 RX ORDER — HYDROCODONE BITARTRATE AND ACETAMINOPHEN 5; 325 MG/1; MG/1
TABLET ORAL
COMMUNITY

## 2020-01-10 NOTE — PERIOP NOTES
Mally Morris was here today for his PAT appointment. Health assessment was completed and instructions given regarding NPO status, medications, Hibiclens washes, and removal of all jewelry and/or body piercing. Instructed patient not to remove the red Blood Bank armband that will be placed on his arm when the Type and Screen is drawn. Instructed to bring walker to the hospital on the day of surgery so it can be properly adjusted by the physical therapist.  Oneta Mario was given to ask questions and all questions were answered. Understanding of instructions was verbalized.

## 2020-01-12 LAB
BACTERIA SPEC CULT: NORMAL
SERVICE CMNT-IMP: NORMAL

## 2020-01-15 NOTE — PROGRESS NOTES
Brandon Razaartur has decided with their surgeon to have a joint replacement to improve mobility and decrease pain. Joint Replacement Pre-Operative class was attended 1/15/2020. Topics discussed included surgery preparation, what to expect the day of surgery, medications (to include a multimodal approach to pain control and limiting narcotics), nutrition, glycemic control, respiratory therapy, physical and occupational therapy, and discharge planning. Discussed the importance of using these alternative pain management methods with the goal of using less opioid use after surgery and at home. A patient education notebook was provided and the opportunity was given to ask questions. The phone number of the [facility]  was provided for any future questions or concerns.

## 2020-01-16 ENCOUNTER — OFFICE VISIT (OUTPATIENT)
Dept: CARDIOLOGY CLINIC | Age: 70
End: 2020-01-16

## 2020-01-16 VITALS
HEART RATE: 73 BPM | WEIGHT: 213 LBS | SYSTOLIC BLOOD PRESSURE: 118 MMHG | OXYGEN SATURATION: 96 % | DIASTOLIC BLOOD PRESSURE: 73 MMHG | BODY MASS INDEX: 31.45 KG/M2

## 2020-01-16 DIAGNOSIS — R06.00 DYSPNEA, UNSPECIFIED TYPE: ICD-10-CM

## 2020-01-16 DIAGNOSIS — Z01.818 PRE-OP TESTING: Primary | ICD-10-CM

## 2020-01-16 DIAGNOSIS — R94.31 ABNORMAL EKG: ICD-10-CM

## 2020-01-16 NOTE — PROGRESS NOTES
1. Have you been to the ER, urgent care clinic since your last visit? Hospitalized since your last visit? No     2. Have you seen or consulted any other health care providers outside of the [address] [place] [address] since your last visit? Include any pap smears or colon screening.  yes

## 2020-01-16 NOTE — PATIENT INSTRUCTIONS
Echo- appt time 1 pm, bring insurance card, photo ID and meds    Nuclear Stress- scheduled for 01/17/2020 at 0815 check in at 0745 comfortable cloths and shoes , NPO after midnight, can have water and meds morning of testing, approx 4 hrs for testing

## 2020-01-16 NOTE — PROGRESS NOTES
[facility]    Mr. Hector Casper is a 52-year-old male with a history of diabetes, hypertension, hyperlipidemia and DJD, sleep apnea    Patient is here today for initial evaluation. Patient was asked to come see me for cardiac evaluation prior to considering elective left knee replacement surgery. Patient had a EKG which was abnormal.  Patient denies any prior history of myocardial infarction or congestive heart failure. He is scheduled for elective left knee replacement surgery next week. Upon further questioning, patient admits that because of his severe DJD and also back pain he is not able to perform activities he would like to do. He can climb probably 12-14 steps and after this he has to stop because of the knee pain. He also cannot walk on a level ground more than a block without getting pain and shortness of breath. He does not recall having any chest pain or chest tightness. Denies any nausea, vomiting, abdominal pain, fever, chills, sputum production. No hematuria or other bleeding complaints    Past Medical History:   Diagnosis Date    Arthritis     Asthma     Degenerative disc disease, lumbar     Diabetes ([address]     HLD (hyperlipidemia)     Hypertension     Intracranial hemorrhage, cerebellar ([address] 2018    no residual    PPD positive, treated 2018    was treated    Sleep apnea     on cpap    Spondylosis of lumbar spine          Past Surgical History:   Procedure Laterality Date    COLONOSCOPY  10/13/2015 Return 10/14/2025    Dr. Eros Dove; screening    HX COLONOSCOPY      HX KNEE ARTHROSCOPY Right 1986    right knee    HX WRIST FRACTURE TX Left     ORIF       Current Outpatient Medications   Medication Sig    HYDROcodone-acetaminophen (NORCO) 5-325 mg per tablet Take  by mouth two (2) times daily as needed.  amLODIPine (NORVASC) 5 mg tablet Take 1 Tab by mouth daily.     atorvastatin (LIPITOR) 10 mg tablet Take 1 Tab by mouth daily.  losartan (COZAAR) 50 mg tablet Take 1 Tab by mouth daily.  cetirizine (ZYRTEC) 10 mg tablet Take 1 Tab by mouth daily.  metoprolol tartrate (LOPRESSOR) 25 mg tablet Take 1 Tab by mouth two (2) times a day.  metFORMIN ER (GLUCOPHAGE XR) 500 mg tablet Take 2 tabs po daily with a meal (Patient taking differently: daily (with dinner). Take 2 tabs po daily with a meal)    SITagliptin (JANUVIA) 50 mg tablet Take 1 Tab by mouth daily.  sildenafil citrate (VIAGRA) 100 mg tablet Take 1 Tab by mouth as needed (E.D.). Once daily    ketoconazole (NIZORAL) 2 % shampoo Shampoo daily, lather and let sit for 5 minutes, then rinse  Indications: Dandruff    tamsulosin (FLOMAX) 0.4 mg capsule Take 1 Cap by mouth daily.  melatonin 5 mg tablet Take  by mouth.  albuterol (PROVENTIL HFA, VENTOLIN HFA, PROAIR HFA) 90 mcg/actuation inhaler Take 2 Puffs by inhalation every six (6) hours as needed for Wheezing. No current facility-administered medications for this visit. Allergies and Sensitivities:  No Known Allergies    Family History:  Family History   Problem Relation Age of Onset    No Known Problems Mother     No Known Problems Father        Social History:  Social History     Tobacco Use    Smoking status: Never Smoker    Smokeless tobacco: Never Used   Substance Use Topics    Alcohol use: Yes     Alcohol/week: 1.7 standard drinks     Types: 2 Cans of beer per week    Drug use: No     He  reports that he has never smoked. He has never used smokeless tobacco.  He  reports current alcohol use of about 1.7 standard drinks of alcohol per week. Review of Systems:  Cardiac symptoms as noted above in HPI. All others negative. Denies fatigue, malaise, skin rash, joint pain, blurring vision, photophobia, neck pain, hemoptysis, chronic cough, nausea, vomiting, hematuria, burning micturition, BRBPR, chronic headaches.     Physical Exam:  BP Readings from Last 3 Encounters: 01/16/20 118/73   06/25/19 130/67   01/28/19 138/64         Pulse Readings from Last 3 Encounters:   01/16/20 73   06/25/19 (!) 58   01/28/19 78          Wt Readings from Last 3 Encounters:   01/16/20 213 lb (96.6 kg)   06/25/19 212 lb (96.2 kg)   01/28/19 207 lb (93.9 kg)       Constitutional: Oriented to person, place, and time. HENT: Head: Normocephalic and atraumatic. Eyes: Conjunctivae and extraocular motions are normal.   Neck: No JVD present. Carotid bruit is not appreciated. Cardiovascular: Regular rhythm. No murmur, gallop or rubs appreciated  Lung: Breath sounds normal. No respiratory distress. No ronchi or rales appreciated  Abdominal: No tenderness. No rebound and no guarding. Musculoskeletal: There is no lower extremity edema. No cynosis  Lymphadenopathy:  No cervical or supraclavicular adenopathy appriciated. Neurological: No gross motor deficit noted. Skin: No visible skin rash noted. No Ear discharge noted  Psychiatric: Normal mood and affect. Good distal pulse    Review of Data  LABS:   Lab Results   Component Value Date/Time    Sodium 140 01/10/2020 10:17 AM    Potassium 4.2 01/10/2020 10:17 AM    Chloride 109 01/10/2020 10:17 AM    CO2 27 01/10/2020 10:17 AM    Glucose 145 (H) 01/10/2020 10:17 AM    BUN 21 (H) 01/10/2020 10:17 AM    Creatinine 0.74 01/10/2020 10:17 AM     Lipids Latest Ref Rng & Units 6/25/2019 7/13/2018 1/18/2018 7/12/2017 2/8/2017   Chol, Total <200 MG/ 168 167 200(H) 177   HDL 40 - 60 MG/DL 48 60 44 58 53   LDL 0 - 100 MG/DL 44.4 92.8 93 120. 4(H) 92.6   Trig <150 MG/ 76 151(H) 108 157(H)   Chol/HDL Ratio 0 - 5.0   2.4 2.8 - 3.4 3.3   Some recent data might be hidden     Lab Results   Component Value Date/Time    ALT (SGPT) 34 01/10/2020 10:17 AM     Lab Results   Component Value Date/Time    Hemoglobin A1c 6.3 (H) 11/14/2019 02:30 PM    Hemoglobin A1c (POC) 5.8 08/11/2015 08:45 AM       EKG  Sinus rhythm. Incomplete right bundle branch block.   No ST changes of ischemia    ECHO    STRESS TEST (EST, PHARM, NUC, ECHO etc)    CATHETERIZATION    IMPRESSION & PLAN:  Mr. Dania Daley is 61-year-old male with multiple medical problem    Hypertension:  Blood pressure is normal at this time. He is currently on amlodipine, Lopressor. He also had a history of intracranial hemorrhage in the setting of uncontrolled hypertension in 2018. Because of longstanding history of uncontrolled hypertension, echo will be ordered to rule out hypertensive cardiovascular heart disease in the setting of abnormal EKG    Hyperlipidemia:  Currently on atorvastatin. Last LDL 44. Continue same PCP is checking lipid profile regularly    Diabetes:  Goal hemoglobin A1c less than 7.5 is recommended. Defer to PCP. Last A1c less 6.3 per patient    Preoperative evaluation:  Patient is scheduled for elective left knee replacement surgery. EKG showed incomplete right bundle branch block. Also because of back pain and severe degenerative joint disease of the knee, his functional status appears to be less than 4 METS. He also gets shortness of breath with exertion. Because of limited functional capacity and abnormal EKG, I recommend he undergo nuclear stress test, pharmacologic to rule out any underlying coronary disease for elective orthopedic surgery. We will make further recommendation based on echo and stress test finding. This was discussed with patient and the family member    Importance of diet and exercise was discussed with patient. This plan was discussed with patient who is in agreement. Thank you for allowing me to participate in patient care. Please feel free to call me if you have any question or concern. Gurjit Grimes MD  Please note: This document has been produced using voice recognition software. Unrecognized errors in transcription may be present.

## 2020-01-17 ENCOUNTER — HOSPITAL ENCOUNTER (OUTPATIENT)
Dept: NUCLEAR MEDICINE | Age: 70
Discharge: HOME | End: 2020-01-17
Attending: INTERNAL MEDICINE
Payer: MEDICARE

## 2020-01-17 ENCOUNTER — HOSPITAL ENCOUNTER (OUTPATIENT)
Dept: NON INVASIVE DIAGNOSTICS | Age: 70
Discharge: HOME | End: 2020-01-17
Attending: INTERNAL MEDICINE
Payer: MEDICARE

## 2020-01-17 VITALS
WEIGHT: 213 LBS | DIASTOLIC BLOOD PRESSURE: 73 MMHG | SYSTOLIC BLOOD PRESSURE: 118 MMHG | BODY MASS INDEX: 31.55 KG/M2 | HEIGHT: 69 IN

## 2020-01-17 VITALS
BODY MASS INDEX: 31.99 KG/M2 | DIASTOLIC BLOOD PRESSURE: 62 MMHG | HEIGHT: 69 IN | WEIGHT: 216 LBS | SYSTOLIC BLOOD PRESSURE: 112 MMHG

## 2020-01-17 DIAGNOSIS — R06.00 DYSPNEA, UNSPECIFIED TYPE: ICD-10-CM

## 2020-01-17 DIAGNOSIS — R94.31 ABNORMAL EKG: ICD-10-CM

## 2020-01-17 DIAGNOSIS — Z01.818 PRE-OP TESTING: ICD-10-CM

## 2020-01-17 LAB
AV VELOCITY RATIO: 0.75
ECHO AO ASC DIAM: 3.31 CM
ECHO AO ROOT DIAM: 3.46 CM
ECHO AV AREA PEAK VELOCITY: 2.6 CM2
ECHO AV AREA/BSA PEAK VELOCITY: 1.2 CM2/M2
ECHO AV PEAK GRADIENT: 9.7 MMHG
ECHO AV PEAK VELOCITY: 155.36 CM/S
ECHO EST RA PRESSURE: 3 MMHG
ECHO IVC SNIFF: 1.94 CM
ECHO LA MAJOR AXIS: 3.42 CM
ECHO LA TO AORTIC ROOT RATIO: 0.99
ECHO LV EDV A2C: 100.2 ML
ECHO LV EDV A4C: 115.9 ML
ECHO LV EDV BP: 107.6 ML (ref 67–155)
ECHO LV EDV INDEX A4C: 54.6 ML/M2
ECHO LV EDV INDEX BP: 50.7 ML/M2
ECHO LV EDV NDEX A2C: 47.2 ML/M2
ECHO LV EDV TEICHHOLZ: 0.9 ML
ECHO LV EJECTION FRACTION A2C: 77 %
ECHO LV EJECTION FRACTION A4C: 65 %
ECHO LV EJECTION FRACTION BIPLANE: 70.8 % (ref 55–100)
ECHO LV ESV A2C: 23.3 ML
ECHO LV ESV A4C: 40.7 ML
ECHO LV ESV BP: 31.4 ML (ref 22–58)
ECHO LV ESV INDEX A2C: 11 ML/M2
ECHO LV ESV INDEX A4C: 19.2 ML/M2
ECHO LV ESV INDEX BP: 14.8 ML/M2
ECHO LV ESV TEICHHOLZ: 0.33 ML
ECHO LV INTERNAL DIMENSION DIASTOLIC: 5.66 CM (ref 4.2–5.9)
ECHO LV INTERNAL DIMENSION SYSTOLIC: 3.71 CM
ECHO LV IVSD: 1.15 CM (ref 0.6–1)
ECHO LV MASS 2D: 295.1 G (ref 88–224)
ECHO LV MASS INDEX 2D: 139.1 G/M2 (ref 49–115)
ECHO LV POSTERIOR WALL DIASTOLIC: 1.01 CM (ref 0.6–1)
ECHO LVOT DIAM: 2.1 CM
ECHO LVOT PEAK GRADIENT: 5.4 MMHG
ECHO LVOT PEAK VELOCITY: 116.46 CM/S
ECHO LVOT SV: 95.8 ML
ECHO LVOT VTI: 27.55 CM
ECHO MV A VELOCITY: 86.41 CM/S
ECHO MV AREA PHT: 3.2 CM2
ECHO MV E DECELERATION TIME (DT): 240.7 MS
ECHO MV E VELOCITY: 80.87 CM/S
ECHO MV E/A RATIO: 0.94
ECHO MV PRESSURE HALF TIME (PHT): 69.8 MS
ECHO PULMONARY ARTERY SYSTOLIC PRESSURE (PASP): 13.3 MMHG
ECHO RA MINOR AXIS: 3.68 CM
ECHO RIGHT VENTRICULAR SYSTOLIC PRESSURE (RVSP): 13.3 MMHG
ECHO TV REGURGITANT MAX VELOCITY: 160.74 CM/S
ECHO TV REGURGITANT PEAK GRADIENT: 10.3 MMHG
LVFS 2D: 34.46 %
LVOT MG: 2.38 MMHG
LVOT MV: 0.7 CM/S
LVSV (MOD BI): 35.13 ML
LVSV (MOD SINGLE 4C): 34.7 ML
LVSV (MOD SINGLE): 35.48 ML
LVSV (TEICH): 45.73 ML
MV DEC SLOPE: 3.36
STRESS BASELINE HR: 62 BPM
STRESS ESTIMATED WORKLOAD: 1 METS
STRESS EXERCISE DUR MIN: NORMAL
STRESS PEAK DIAS BP: 68 MMHG
STRESS PEAK SYS BP: 126 MMHG
STRESS PERCENT HR ACHIEVED: 51 %
STRESS POST PEAK HR: 77 BPM
STRESS RATE PRESSURE PRODUCT: 9702 BPM*MMHG
STRESS ST DEPRESSION: 0 MM
STRESS ST ELEVATION: 0 MM
STRESS TARGET HR: 152 BPM

## 2020-01-17 PROCEDURE — 93306 TTE W/DOPPLER COMPLETE: CPT

## 2020-01-17 PROCEDURE — 93017 CV STRESS TEST TRACING ONLY: CPT

## 2020-01-17 PROCEDURE — 74011250636 HC RX REV CODE- 250/636: Performed by: INTERNAL MEDICINE

## 2020-01-17 PROCEDURE — A9500 TC99M SESTAMIBI: HCPCS

## 2020-01-17 RX ADMIN — REGADENOSON 0.4 MG: 0.08 INJECTION, SOLUTION INTRAVENOUS at 10:48

## 2020-01-20 ENCOUNTER — TELEPHONE (OUTPATIENT)
Dept: CARDIOLOGY CLINIC | Age: 70
End: 2020-01-20

## 2020-01-20 NOTE — TELEPHONE ENCOUNTER
Verbal order and read back per Claudio Guardado MD  Echo and nuc are ok, patient may proceed with upcoming surgery. LTR done, faxed along with office notes and results sent to Dr. Rizwan Cartwright at fax # [phone] and also faxed to [facility] [place] office at [phone] to patient and wife to make them aware as well.

## 2020-01-22 ENCOUNTER — ANESTHESIA EVENT (OUTPATIENT)
Dept: SURGERY | Age: 70
DRG: 470 | End: 2020-01-22
Payer: MEDICARE

## 2020-01-23 ENCOUNTER — HOME HEALTH ADMISSION (OUTPATIENT)
Dept: HOME HEALTH SERVICES | Facility: HOME HEALTH | Age: 70
End: 2020-01-23
Payer: MEDICARE

## 2020-01-23 ENCOUNTER — ANESTHESIA (OUTPATIENT)
Dept: SURGERY | Age: 70
DRG: 470 | End: 2020-01-23
Payer: MEDICARE

## 2020-01-23 ENCOUNTER — HOSPITAL ENCOUNTER (INPATIENT)
Age: 70
LOS: 1 days | Discharge: HOME HEALTH | DRG: 470 | End: 2020-01-24
Attending: ORTHOPAEDIC SURGERY | Admitting: ORTHOPAEDIC SURGERY
Payer: MEDICARE

## 2020-01-23 PROBLEM — M17.9 KNEE OSTEOARTHRITIS: Status: ACTIVE | Noted: 2020-01-23

## 2020-01-23 LAB
GLUCOSE BLD STRIP.AUTO-MCNC: 134 MG/DL (ref 70–110)
GLUCOSE BLD STRIP.AUTO-MCNC: 138 MG/DL (ref 70–110)
GLUCOSE BLD STRIP.AUTO-MCNC: 145 MG/DL (ref 70–110)
HCT VFR BLD AUTO: 39.6 % (ref 36–48)
HGB BLD-MCNC: 13.5 G/DL (ref 13–16)

## 2020-01-23 PROCEDURE — 77030006835 HC BLD SAW SAG STRY -B: Performed by: ORTHOPAEDIC SURGERY

## 2020-01-23 PROCEDURE — 77030040922 HC BLNKT HYPOTHRM STRY -A: Performed by: ORTHOPAEDIC SURGERY

## 2020-01-23 PROCEDURE — 74011000258 HC RX REV CODE- 258: Performed by: ORTHOPAEDIC SURGERY

## 2020-01-23 PROCEDURE — 64450 NJX AA&/STRD OTHER PN/BRANCH: CPT | Performed by: ANESTHESIOLOGY

## 2020-01-23 PROCEDURE — 74011250637 HC RX REV CODE- 250/637: Performed by: ORTHOPAEDIC SURGERY

## 2020-01-23 PROCEDURE — 77030010785: Performed by: ORTHOPAEDIC SURGERY

## 2020-01-23 PROCEDURE — 77030002933 HC SUT MCRYL J&J -A: Performed by: ORTHOPAEDIC SURGERY

## 2020-01-23 PROCEDURE — 77030012411 HC DRN WND CARD -A: Performed by: ORTHOPAEDIC SURGERY

## 2020-01-23 PROCEDURE — 74011250636 HC RX REV CODE- 250/636: Performed by: ORTHOPAEDIC SURGERY

## 2020-01-23 PROCEDURE — 74011250636 HC RX REV CODE- 250/636: Performed by: NURSE ANESTHETIST, CERTIFIED REGISTERED

## 2020-01-23 PROCEDURE — 77030031139 HC SUT VCRL2 J&J -A: Performed by: ORTHOPAEDIC SURGERY

## 2020-01-23 PROCEDURE — C1713 ANCHOR/SCREW BN/BN,TIS/BN: HCPCS | Performed by: ORTHOPAEDIC SURGERY

## 2020-01-23 PROCEDURE — 77030006812 HC BLD SAW RECIP STRY -B: Performed by: ORTHOPAEDIC SURGERY

## 2020-01-23 PROCEDURE — 74011250637 HC RX REV CODE- 250/637: Performed by: NURSE ANESTHETIST, CERTIFIED REGISTERED

## 2020-01-23 PROCEDURE — 77030018836 HC SOL IRR NACL ICUM -A: Performed by: ORTHOPAEDIC SURGERY

## 2020-01-23 PROCEDURE — 77030003029 HC SUT VCRL J&J -B: Performed by: ORTHOPAEDIC SURGERY

## 2020-01-23 PROCEDURE — 76210000006 HC OR PH I REC 0.5 TO 1 HR: Performed by: ORTHOPAEDIC SURGERY

## 2020-01-23 PROCEDURE — 76010000172 HC OR TIME 2.5 TO 3 HR INTENSV-TIER 1: Performed by: ORTHOPAEDIC SURGERY

## 2020-01-23 PROCEDURE — 77030008462 HC STPLR SKN PROX J&J -A: Performed by: ORTHOPAEDIC SURGERY

## 2020-01-23 PROCEDURE — 76060000036 HC ANESTHESIA 2.5 TO 3 HR: Performed by: ORTHOPAEDIC SURGERY

## 2020-01-23 PROCEDURE — 82962 GLUCOSE BLOOD TEST: CPT

## 2020-01-23 PROCEDURE — 74011000258 HC RX REV CODE- 258: Performed by: NURSE ANESTHETIST, CERTIFIED REGISTERED

## 2020-01-23 PROCEDURE — 74011000250 HC RX REV CODE- 250: Performed by: NURSE ANESTHETIST, CERTIFIED REGISTERED

## 2020-01-23 PROCEDURE — 65270000029 HC RM PRIVATE

## 2020-01-23 PROCEDURE — 77030040361 HC SLV COMPR DVT MDII -B: Performed by: ORTHOPAEDIC SURGERY

## 2020-01-23 PROCEDURE — 85018 HEMOGLOBIN: CPT

## 2020-01-23 PROCEDURE — 77030027138 HC INCENT SPIROMETER -A: Performed by: ORTHOPAEDIC SURGERY

## 2020-01-23 PROCEDURE — 97116 GAIT TRAINING THERAPY: CPT

## 2020-01-23 PROCEDURE — 77030019605: Performed by: ORTHOPAEDIC SURGERY

## 2020-01-23 PROCEDURE — 77030000032 HC CUF TRNQT ZIMM -B: Performed by: ORTHOPAEDIC SURGERY

## 2020-01-23 PROCEDURE — 36415 COLL VENOUS BLD VENIPUNCTURE: CPT

## 2020-01-23 PROCEDURE — 74011000272 HC RX REV CODE- 272: Performed by: ORTHOPAEDIC SURGERY

## 2020-01-23 PROCEDURE — 77030003666 HC NDL SPINAL BD -A: Performed by: ORTHOPAEDIC SURGERY

## 2020-01-23 PROCEDURE — C9290 INJ, BUPIVACAINE LIPOSOME: HCPCS | Performed by: ORTHOPAEDIC SURGERY

## 2020-01-23 PROCEDURE — 97161 PT EVAL LOW COMPLEX 20 MIN: CPT

## 2020-01-23 PROCEDURE — 0SRD0J9 REPLACEMENT OF LEFT KNEE JOINT WITH SYNTHETIC SUBSTITUTE, CEMENTED, OPEN APPROACH: ICD-10-PCS | Performed by: ORTHOPAEDIC SURGERY

## 2020-01-23 PROCEDURE — 77030013708 HC HNDPC SUC IRR PULS STRY –B: Performed by: ORTHOPAEDIC SURGERY

## 2020-01-23 PROCEDURE — C1776 JOINT DEVICE (IMPLANTABLE): HCPCS | Performed by: ORTHOPAEDIC SURGERY

## 2020-01-23 PROCEDURE — 76942 ECHO GUIDE FOR BIOPSY: CPT | Performed by: ANESTHESIOLOGY

## 2020-01-23 PROCEDURE — 74011000250 HC RX REV CODE- 250: Performed by: ORTHOPAEDIC SURGERY

## 2020-01-23 DEVICE — INSERT TIB RP FEM KNEE CEM: Type: IMPLANTABLE DEVICE | Site: KNEE | Status: FUNCTIONAL

## 2020-01-23 DEVICE — CEMENT BNE GENTAMICIN 40 GM HI VISC SINGLE DOSE CMW 1: Type: IMPLANTABLE DEVICE | Site: KNEE | Status: FUNCTIONAL

## 2020-01-23 DEVICE — COMPONENT PAT DIA41MM POLYETH DOME CEM MEDIALIZED ATTUNE: Type: IMPLANTABLE DEVICE | Site: KNEE | Status: FUNCTIONAL

## 2020-01-23 DEVICE — INSERT TIB SZ 7 THK8MM KNEE POST STBL ROT PLATFRM ATTUNE: Type: IMPLANTABLE DEVICE | Site: KNEE | Status: FUNCTIONAL

## 2020-01-23 DEVICE — COMPONENT FEM SZ 7 L KNEE POST STBL CEM ATTUNE: Type: IMPLANTABLE DEVICE | Site: KNEE | Status: FUNCTIONAL

## 2020-01-23 RX ORDER — SODIUM CHLORIDE 0.9 % (FLUSH) 0.9 %
5-40 SYRINGE (ML) INJECTION EVERY 8 HOURS
Status: DISCONTINUED | OUTPATIENT
Start: 2020-01-23 | End: 2020-01-24 | Stop reason: HOSPADM

## 2020-01-23 RX ORDER — ONDANSETRON 4 MG/1
4 TABLET, ORALLY DISINTEGRATING ORAL
Status: DISCONTINUED | OUTPATIENT
Start: 2020-01-23 | End: 2020-01-24 | Stop reason: HOSPADM

## 2020-01-23 RX ORDER — SODIUM CHLORIDE 0.9 % (FLUSH) 0.9 %
5-40 SYRINGE (ML) INJECTION AS NEEDED
Status: DISCONTINUED | OUTPATIENT
Start: 2020-01-23 | End: 2020-01-23

## 2020-01-23 RX ORDER — DIPHENHYDRAMINE HCL 25 MG
25 CAPSULE ORAL
Status: DISCONTINUED | OUTPATIENT
Start: 2020-01-23 | End: 2020-01-24 | Stop reason: HOSPADM

## 2020-01-23 RX ORDER — CEFAZOLIN SODIUM 2 G/50ML
2 SOLUTION INTRAVENOUS ONCE
Status: DISCONTINUED | OUTPATIENT
Start: 2020-01-23 | End: 2020-01-23 | Stop reason: HOSPADM

## 2020-01-23 RX ORDER — GLYCOPYRROLATE 0.2 MG/ML
INJECTION INTRAMUSCULAR; INTRAVENOUS AS NEEDED
Status: DISCONTINUED | OUTPATIENT
Start: 2020-01-23 | End: 2020-01-23 | Stop reason: HOSPADM

## 2020-01-23 RX ORDER — ONDANSETRON 2 MG/ML
INJECTION INTRAMUSCULAR; INTRAVENOUS AS NEEDED
Status: DISCONTINUED | OUTPATIENT
Start: 2020-01-23 | End: 2020-01-23 | Stop reason: HOSPADM

## 2020-01-23 RX ORDER — HYDROMORPHONE HYDROCHLORIDE 1 MG/ML
1 INJECTION, SOLUTION INTRAMUSCULAR; INTRAVENOUS; SUBCUTANEOUS
Status: DISCONTINUED | OUTPATIENT
Start: 2020-01-23 | End: 2020-01-24 | Stop reason: HOSPADM

## 2020-01-23 RX ORDER — ROPIVACAINE HYDROCHLORIDE 2 MG/ML
30 INJECTION, SOLUTION EPIDURAL; INFILTRATION; PERINEURAL
Status: COMPLETED | OUTPATIENT
Start: 2020-01-23 | End: 2020-01-23

## 2020-01-23 RX ORDER — ASPIRIN 325 MG
325 TABLET ORAL DAILY
Status: DISCONTINUED | OUTPATIENT
Start: 2020-01-23 | End: 2020-01-24 | Stop reason: HOSPADM

## 2020-01-23 RX ORDER — OXYCODONE HYDROCHLORIDE 5 MG/1
5-15 TABLET ORAL
Status: DISCONTINUED | OUTPATIENT
Start: 2020-01-23 | End: 2020-01-24 | Stop reason: HOSPADM

## 2020-01-23 RX ORDER — SODIUM CHLORIDE 0.9 % (FLUSH) 0.9 %
5-40 SYRINGE (ML) INJECTION EVERY 8 HOURS
Status: DISCONTINUED | OUTPATIENT
Start: 2020-01-23 | End: 2020-01-23 | Stop reason: HOSPADM

## 2020-01-23 RX ORDER — CEFAZOLIN SODIUM IN 0.9 % NACL 2 G/100 ML
PLASTIC BAG, INJECTION (ML) INTRAVENOUS AS NEEDED
Status: DISCONTINUED | OUTPATIENT
Start: 2020-01-23 | End: 2020-01-23 | Stop reason: HOSPADM

## 2020-01-23 RX ORDER — FENTANYL CITRATE 50 UG/ML
INJECTION, SOLUTION INTRAMUSCULAR; INTRAVENOUS AS NEEDED
Status: DISCONTINUED | OUTPATIENT
Start: 2020-01-23 | End: 2020-01-23 | Stop reason: HOSPADM

## 2020-01-23 RX ORDER — ONDANSETRON 2 MG/ML
4 INJECTION INTRAMUSCULAR; INTRAVENOUS ONCE
Status: COMPLETED | OUTPATIENT
Start: 2020-01-23 | End: 2020-01-23

## 2020-01-23 RX ORDER — POLYETHYLENE GLYCOL 3350 17 G/17G
17 POWDER, FOR SOLUTION ORAL DAILY
Status: DISCONTINUED | OUTPATIENT
Start: 2020-01-23 | End: 2020-01-24 | Stop reason: HOSPADM

## 2020-01-23 RX ORDER — NEOSTIGMINE METHYLSULFATE 1 MG/ML
INJECTION, SOLUTION INTRAVENOUS AS NEEDED
Status: DISCONTINUED | OUTPATIENT
Start: 2020-01-23 | End: 2020-01-23 | Stop reason: HOSPADM

## 2020-01-23 RX ORDER — FAMOTIDINE 20 MG/1
20 TABLET, FILM COATED ORAL ONCE
Status: COMPLETED | OUTPATIENT
Start: 2020-01-23 | End: 2020-01-23

## 2020-01-23 RX ORDER — LIDOCAINE HYDROCHLORIDE 20 MG/ML
INJECTION, SOLUTION EPIDURAL; INFILTRATION; INTRACAUDAL; PERINEURAL AS NEEDED
Status: DISCONTINUED | OUTPATIENT
Start: 2020-01-23 | End: 2020-01-23 | Stop reason: HOSPADM

## 2020-01-23 RX ORDER — FENTANYL CITRATE 50 UG/ML
100 INJECTION, SOLUTION INTRAMUSCULAR; INTRAVENOUS ONCE
Status: COMPLETED | OUTPATIENT
Start: 2020-01-23 | End: 2020-01-23

## 2020-01-23 RX ORDER — PROPOFOL 10 MG/ML
INJECTION, EMULSION INTRAVENOUS AS NEEDED
Status: DISCONTINUED | OUTPATIENT
Start: 2020-01-23 | End: 2020-01-23 | Stop reason: HOSPADM

## 2020-01-23 RX ORDER — CEFAZOLIN SODIUM 2 G/50ML
2 SOLUTION INTRAVENOUS EVERY 8 HOURS
Status: COMPLETED | OUTPATIENT
Start: 2020-01-23 | End: 2020-01-23

## 2020-01-23 RX ORDER — MAGNESIUM SULFATE 100 %
4 CRYSTALS MISCELLANEOUS AS NEEDED
Status: DISCONTINUED | OUTPATIENT
Start: 2020-01-23 | End: 2020-01-23 | Stop reason: HOSPADM

## 2020-01-23 RX ORDER — ROCURONIUM BROMIDE 10 MG/ML
INJECTION, SOLUTION INTRAVENOUS AS NEEDED
Status: DISCONTINUED | OUTPATIENT
Start: 2020-01-23 | End: 2020-01-23 | Stop reason: HOSPADM

## 2020-01-23 RX ORDER — NALOXONE HYDROCHLORIDE 0.4 MG/ML
0.1 INJECTION, SOLUTION INTRAMUSCULAR; INTRAVENOUS; SUBCUTANEOUS ONCE
Status: DISCONTINUED | OUTPATIENT
Start: 2020-01-23 | End: 2020-01-23 | Stop reason: HOSPADM

## 2020-01-23 RX ORDER — AMOXICILLIN 250 MG
1 CAPSULE ORAL 2 TIMES DAILY
Status: DISCONTINUED | OUTPATIENT
Start: 2020-01-23 | End: 2020-01-24 | Stop reason: HOSPADM

## 2020-01-23 RX ORDER — SODIUM CHLORIDE 0.9 % (FLUSH) 0.9 %
5-40 SYRINGE (ML) INJECTION AS NEEDED
Status: DISCONTINUED | OUTPATIENT
Start: 2020-01-23 | End: 2020-01-24 | Stop reason: HOSPADM

## 2020-01-23 RX ORDER — HYDROMORPHONE HYDROCHLORIDE 2 MG/ML
0.5 INJECTION, SOLUTION INTRAMUSCULAR; INTRAVENOUS; SUBCUTANEOUS
Status: DISCONTINUED | OUTPATIENT
Start: 2020-01-23 | End: 2020-01-23 | Stop reason: HOSPADM

## 2020-01-23 RX ORDER — SODIUM CHLORIDE 0.9 % (FLUSH) 0.9 %
5-40 SYRINGE (ML) INJECTION AS NEEDED
Status: DISCONTINUED | OUTPATIENT
Start: 2020-01-23 | End: 2020-01-23 | Stop reason: HOSPADM

## 2020-01-23 RX ORDER — SUCCINYLCHOLINE CHLORIDE 20 MG/ML
INJECTION INTRAMUSCULAR; INTRAVENOUS AS NEEDED
Status: DISCONTINUED | OUTPATIENT
Start: 2020-01-23 | End: 2020-01-23 | Stop reason: HOSPADM

## 2020-01-23 RX ORDER — FACIAL-BODY WIPES
10 EACH TOPICAL DAILY PRN
Status: DISCONTINUED | OUTPATIENT
Start: 2020-01-23 | End: 2020-01-24 | Stop reason: HOSPADM

## 2020-01-23 RX ORDER — LOSARTAN POTASSIUM 50 MG/1
50 TABLET ORAL DAILY
Status: DISCONTINUED | OUTPATIENT
Start: 2020-01-23 | End: 2020-01-24 | Stop reason: HOSPADM

## 2020-01-23 RX ORDER — TAMSULOSIN HYDROCHLORIDE 0.4 MG/1
0.4 CAPSULE ORAL DAILY
Status: DISCONTINUED | OUTPATIENT
Start: 2020-01-23 | End: 2020-01-24 | Stop reason: HOSPADM

## 2020-01-23 RX ORDER — ATORVASTATIN CALCIUM 10 MG/1
10 TABLET, FILM COATED ORAL DAILY
Status: DISCONTINUED | OUTPATIENT
Start: 2020-01-23 | End: 2020-01-24 | Stop reason: HOSPADM

## 2020-01-23 RX ORDER — INSULIN LISPRO 100 [IU]/ML
INJECTION, SOLUTION INTRAVENOUS; SUBCUTANEOUS ONCE
Status: DISCONTINUED | OUTPATIENT
Start: 2020-01-23 | End: 2020-01-23 | Stop reason: HOSPADM

## 2020-01-23 RX ORDER — METFORMIN HYDROCHLORIDE 500 MG/1
500 TABLET, EXTENDED RELEASE ORAL
Status: DISCONTINUED | OUTPATIENT
Start: 2020-01-23 | End: 2020-01-24 | Stop reason: HOSPADM

## 2020-01-23 RX ORDER — ALBUTEROL SULFATE 0.83 MG/ML
2.5 SOLUTION RESPIRATORY (INHALATION)
Status: DISCONTINUED | OUTPATIENT
Start: 2020-01-23 | End: 2020-01-24 | Stop reason: HOSPADM

## 2020-01-23 RX ORDER — MIDAZOLAM HYDROCHLORIDE 1 MG/ML
2 INJECTION, SOLUTION INTRAMUSCULAR; INTRAVENOUS ONCE
Status: COMPLETED | OUTPATIENT
Start: 2020-01-23 | End: 2020-01-23

## 2020-01-23 RX ORDER — LIDOCAINE HYDROCHLORIDE 10 MG/ML
0.1 INJECTION, SOLUTION EPIDURAL; INFILTRATION; INTRACAUDAL; PERINEURAL AS NEEDED
Status: DISCONTINUED | OUTPATIENT
Start: 2020-01-23 | End: 2020-01-23 | Stop reason: HOSPADM

## 2020-01-23 RX ORDER — SODIUM CHLORIDE, SODIUM LACTATE, POTASSIUM CHLORIDE, CALCIUM CHLORIDE 600; 310; 30; 20 MG/100ML; MG/100ML; MG/100ML; MG/100ML
100 INJECTION, SOLUTION INTRAVENOUS CONTINUOUS
Status: DISPENSED | OUTPATIENT
Start: 2020-01-23 | End: 2020-01-24

## 2020-01-23 RX ORDER — SODIUM CHLORIDE, SODIUM LACTATE, POTASSIUM CHLORIDE, CALCIUM CHLORIDE 600; 310; 30; 20 MG/100ML; MG/100ML; MG/100ML; MG/100ML
25 INJECTION, SOLUTION INTRAVENOUS CONTINUOUS
Status: DISCONTINUED | OUTPATIENT
Start: 2020-01-23 | End: 2020-01-23 | Stop reason: HOSPADM

## 2020-01-23 RX ORDER — SODIUM CHLORIDE, SODIUM LACTATE, POTASSIUM CHLORIDE, CALCIUM CHLORIDE 600; 310; 30; 20 MG/100ML; MG/100ML; MG/100ML; MG/100ML
75 INJECTION, SOLUTION INTRAVENOUS CONTINUOUS
Status: DISCONTINUED | OUTPATIENT
Start: 2020-01-23 | End: 2020-01-23 | Stop reason: HOSPADM

## 2020-01-23 RX ORDER — MIDAZOLAM HYDROCHLORIDE 1 MG/ML
INJECTION, SOLUTION INTRAMUSCULAR; INTRAVENOUS AS NEEDED
Status: DISCONTINUED | OUTPATIENT
Start: 2020-01-23 | End: 2020-01-23 | Stop reason: HOSPADM

## 2020-01-23 RX ORDER — LORATADINE 10 MG/1
10 TABLET ORAL DAILY
Status: DISCONTINUED | OUTPATIENT
Start: 2020-01-23 | End: 2020-01-24 | Stop reason: HOSPADM

## 2020-01-23 RX ORDER — SODIUM CHLORIDE 0.9 % (FLUSH) 0.9 %
5-40 SYRINGE (ML) INJECTION EVERY 8 HOURS
Status: DISCONTINUED | OUTPATIENT
Start: 2020-01-23 | End: 2020-01-23

## 2020-01-23 RX ORDER — DEXTROSE 50 % IN WATER (D50W) INTRAVENOUS SYRINGE
25-50 AS NEEDED
Status: DISCONTINUED | OUTPATIENT
Start: 2020-01-23 | End: 2020-01-23 | Stop reason: HOSPADM

## 2020-01-23 RX ADMIN — Medication 2 G: at 07:44

## 2020-01-23 RX ADMIN — LORATADINE 10 MG: 10 TABLET ORAL at 13:49

## 2020-01-23 RX ADMIN — ONDANSETRON 4 MG: 2 INJECTION INTRAMUSCULAR; INTRAVENOUS at 10:21

## 2020-01-23 RX ADMIN — POLYETHYLENE GLYCOL 3350 17 G: 17 POWDER, FOR SOLUTION ORAL at 13:48

## 2020-01-23 RX ADMIN — SENNOSIDES AND DOCUSATE SODIUM 1 TABLET: 8.6; 5 TABLET ORAL at 13:49

## 2020-01-23 RX ADMIN — CEFAZOLIN SODIUM 2 G: 2 SOLUTION INTRAVENOUS at 14:58

## 2020-01-23 RX ADMIN — HYDROMORPHONE HYDROCHLORIDE 0.5 MG: 2 INJECTION, SOLUTION INTRAMUSCULAR; INTRAVENOUS; SUBCUTANEOUS at 10:31

## 2020-01-23 RX ADMIN — ROCURONIUM BROMIDE 40 MG: 10 INJECTION, SOLUTION INTRAVENOUS at 08:03

## 2020-01-23 RX ADMIN — METFORMIN HYDROCHLORIDE 500 MG: 500 TABLET, EXTENDED RELEASE ORAL at 17:19

## 2020-01-23 RX ADMIN — HYDROMORPHONE HYDROCHLORIDE 0.5 MG: 2 INJECTION, SOLUTION INTRAMUSCULAR; INTRAVENOUS; SUBCUTANEOUS at 10:21

## 2020-01-23 RX ADMIN — OXYCODONE HYDROCHLORIDE 10 MG: 5 TABLET ORAL at 14:58

## 2020-01-23 RX ADMIN — SODIUM CHLORIDE, SODIUM LACTATE, POTASSIUM CHLORIDE, AND CALCIUM CHLORIDE 100 ML/HR: 600; 310; 30; 20 INJECTION, SOLUTION INTRAVENOUS at 12:10

## 2020-01-23 RX ADMIN — ASPIRIN 325 MG ORAL TABLET 325 MG: 325 PILL ORAL at 13:49

## 2020-01-23 RX ADMIN — TRANEXAMIC ACID 1 G: 100 INJECTION, SOLUTION INTRAVENOUS at 10:05

## 2020-01-23 RX ADMIN — CEFAZOLIN SODIUM 2 G: 2 SOLUTION INTRAVENOUS at 22:41

## 2020-01-23 RX ADMIN — GLYCOPYRROLATE 0.4 MG: 0.2 INJECTION INTRAMUSCULAR; INTRAVENOUS at 09:42

## 2020-01-23 RX ADMIN — SUCCINYLCHOLINE CHLORIDE 140 MG: 20 INJECTION, SOLUTION INTRAMUSCULAR; INTRAVENOUS at 07:40

## 2020-01-23 RX ADMIN — ONDANSETRON 4 MG: 2 INJECTION INTRAMUSCULAR; INTRAVENOUS at 09:28

## 2020-01-23 RX ADMIN — Medication 3 MG: at 09:42

## 2020-01-23 RX ADMIN — LIDOCAINE HYDROCHLORIDE 0.1 ML: 10 INJECTION, SOLUTION EPIDURAL; INFILTRATION; INTRACAUDAL; PERINEURAL at 07:10

## 2020-01-23 RX ADMIN — ATORVASTATIN CALCIUM 10 MG: 10 TABLET, FILM COATED ORAL at 13:49

## 2020-01-23 RX ADMIN — TAMSULOSIN HYDROCHLORIDE 0.4 MG: 0.4 CAPSULE ORAL at 13:49

## 2020-01-23 RX ADMIN — FENTANYL CITRATE 50 MCG: 50 INJECTION INTRAMUSCULAR; INTRAVENOUS at 07:39

## 2020-01-23 RX ADMIN — FENTANYL CITRATE 50 MCG: 50 INJECTION INTRAMUSCULAR; INTRAVENOUS at 08:17

## 2020-01-23 RX ADMIN — SODIUM CHLORIDE, SODIUM LACTATE, POTASSIUM CHLORIDE, AND CALCIUM CHLORIDE 25 ML/HR: 600; 310; 30; 20 INJECTION, SOLUTION INTRAVENOUS at 06:37

## 2020-01-23 RX ADMIN — HYDROMORPHONE HYDROCHLORIDE 1 MG: 1 INJECTION, SOLUTION INTRAMUSCULAR; INTRAVENOUS; SUBCUTANEOUS at 22:39

## 2020-01-23 RX ADMIN — MIDAZOLAM 2 MG: 1 INJECTION INTRAMUSCULAR; INTRAVENOUS at 07:09

## 2020-01-23 RX ADMIN — ROPIVACAINE HYDROCHLORIDE 60 MG: 2 INJECTION, SOLUTION EPIDURAL; INFILTRATION at 07:11

## 2020-01-23 RX ADMIN — OXYCODONE HYDROCHLORIDE 15 MG: 5 TABLET ORAL at 20:00

## 2020-01-23 RX ADMIN — MIDAZOLAM HYDROCHLORIDE 2 MG: 2 INJECTION, SOLUTION INTRAMUSCULAR; INTRAVENOUS at 07:30

## 2020-01-23 RX ADMIN — LOSARTAN POTASSIUM 50 MG: 50 TABLET, FILM COATED ORAL at 13:49

## 2020-01-23 RX ADMIN — Medication 10 ML: at 23:55

## 2020-01-23 RX ADMIN — FENTANYL CITRATE 100 MCG: 50 INJECTION, SOLUTION INTRAMUSCULAR; INTRAVENOUS at 07:09

## 2020-01-23 RX ADMIN — PROPOFOL 120 MG: 10 INJECTION, EMULSION INTRAVENOUS at 07:40

## 2020-01-23 RX ADMIN — FAMOTIDINE 20 MG: 20 TABLET, FILM COATED ORAL at 06:37

## 2020-01-23 RX ADMIN — SENNOSIDES AND DOCUSATE SODIUM 1 TABLET: 8.6; 5 TABLET ORAL at 17:19

## 2020-01-23 RX ADMIN — LIDOCAINE HYDROCHLORIDE 40 MG: 20 INJECTION, SOLUTION EPIDURAL; INFILTRATION; INTRACAUDAL; PERINEURAL at 07:40

## 2020-01-23 RX ADMIN — TRANEXAMIC ACID 1 G: 100 INJECTION, SOLUTION INTRAVENOUS at 07:48

## 2020-01-23 RX ADMIN — SITAGLIPTIN 50 MG: 50 TABLET, FILM COATED ORAL at 13:49

## 2020-01-23 NOTE — PERIOP NOTES
TRANSFER - OUT REPORT:    Verbal report given to Bridget MOORE(name) on Gretchen Avila  being transferred to [address] [place] [id number](unit) for routine post - op       Report consisted of patients Situation, Background, Assessment and   Recommendations(SBAR). Information from the following report(s) SBAR, OR Summary, Procedure Summary, Intake/Output and MAR was reviewed with the receiving nurse. Lines:   Peripheral IV 01/23/20 Right Hand (Active)   Site Assessment Clean, dry, & intact 1/23/2020 10:10 AM   Phlebitis Assessment 0 1/23/2020 10:10 AM   Infiltration Assessment 0 1/23/2020 10:10 AM   Dressing Status Clean, dry, & intact 1/23/2020 10:10 AM   Dressing Type Transparent;Tape 1/23/2020 10:10 AM   Hub Color/Line Status Pink; Infusing 1/23/2020 10:10 AM        Opportunity for questions and clarification was provided.       Patient transported with:   O2 @ 3 liters  Tech

## 2020-01-23 NOTE — HOME CARE
Received referral for [facility] for SN and PT - [place] knee protocol. Met with patient's wife while he was working with therapy. Explained  services and answered all questions. Demographics verified. Patient has the following DME: cane, raised toilet seat, chair lift, CPAP, grab bars, shower chair, and glucometer. [facility] will continue to follow.   Cristin Lunsford, [address] Liaison

## 2020-01-23 NOTE — ANESTHESIA PROCEDURE NOTES
Peripheral Block    Start time: 1/23/2020 7:08 AM  End time: 1/23/2020 7:15 AM  Performed by: Kirsten Ornelas MD  Authorized by: Kirsten Ornelas MD       Pre-procedure:   Preanesthetic Checklist: patient identified, risks and benefits discussed, site marked, timeout performed, anesthesia consent given and patient being monitored    Timeout Time: 07:08          Block Type:   Block Type: Adductor canal  Laterality:  Left    Assessment:    Injection Assessment:     Single Shot Nerve Block Procedure Note    Patient: Car Farah MRN: [id number]  SSN: [id number]   YOB: 1950  Age: 77 y.o. Sex: male      Cardiovascular Function/Vital Signs  There were no vitals taken for this visit. Blocks AC  Referring physician:   : Sari Ching MD    Indication: Post-operative analgesia per surgeon's request  Location: Preoperative Holding area. Sedation: Midazolam 2mg, fentanyl 100mcg  Time out performed, correct patient, side, site, and procedure verified. Patient placed in supine position. Monitors/oxygen applied; left thigh marked and prepped with chloraprep. Target nerves identified by ultrasound, 30 ml's 0.2% Ropivicaine injected in divided doses with negative aspiration through 21 gauge 10 cm Stimuplex insulated needle. Depth of needle at time of injection 5 cm. Block Notes:   Incremental injection    Blood aspirated:  NO    Persistent Pain with injection:  NO    Resistance to injection:  NO    Events:  None - Easy and well-tolerated:  YES       Difficult:  NO   Ultrasound guidance used for needle placement  Nerves and surrounding structures ID'd  Perineural injection   No IV injection  Patient tolerated procedure well, vital signs stable throughout, with no apparent complications.   1/23/2020  7:32 AM  Sari Ching MD

## 2020-01-23 NOTE — PROGRESS NOTES
Reason for Admission:  Primary osteoarthritis of left knee [M17.12]  Knee osteoarthritis [M17.10]                 RRAT Score:    6%            Plan for utilizing home health: Yes                      Likelihood of Readmission:   LOW                         Transition of Care Plan: Home with home health            Initial assessment completed with patient. Cognitive status of patient: oriented to time, place, person and situation. Face sheet information confirmed:  yes. The patient designates wife, Haley Kern to participate in his discharge plan and to receive any needed information. This patient lives in a single family home with wife. Patient is not able to navigate steps as needed. Prior to hospitalization, patient was considered to be independent with ADLs/IADLS : yes. Patient has a current ACP document on file: no  The wife will be available to transport patient home upon discharge. The patient already has Glenna Kluver, Stair lift, Shower chair, Grab bars, raised toilet seat, and CPAP medical equipment available in the home. Patient is not currently active with home health. Patient has not stayed in a skilled nursing facility or rehab. This patient is on dialysis :no    Freedom of choice signed: yes, for [facility]. Currently, the discharge plan is Home with [address] [place] [address] Adan Benitez. The patient states that he can obtain his medications from the pharmacy, and take his medications as directed. Patient's current insurance is Medicare/[employer]       Care Management Interventions  PCP Verified by CM:  Yes  Last Visit to PCP: 01/16/20  Mode of Transport at Discharge: Self  Transition of Care Consult (CM Consult): [address]: Yes  Physical Therapy Consult: Yes  Occupational Therapy Consult: Yes  Speech Therapy Consult: No  Current Support Network: Lives with Spouse  Confirm Follow Up Transport: Family  The Plan for Transition of Care is Related to the Following Treatment Goals : Home with home health  The Patient and/or Patient Representative was Provided with a Choice of Provider and Agrees with the Discharge Plan?: Yes  Freedom of Choice List was Provided with Basic Dialogue that Supports the Patient's Individualized Plan of Care/Goals, Treatment Preferences and Shares the Quality Data Associated with the Providers?: Yes  Discharge Location  Discharge Placement: Home with home health        Olu Rice RN - Outcomes Manager  [phone]

## 2020-01-23 NOTE — ANESTHESIA POSTPROCEDURE EVALUATION
Procedure(s):  LEFT TOTAL KNEE REPLACEMENT/DEPUY/2 SA'S/ADDUCTOR BLOCK/AQUAMANTYS. general    Anesthesia Post Evaluation      Multimodal analgesia: multimodal analgesia used between 6 hours prior to anesthesia start to PACU discharge  Patient location during evaluation: bedside  Patient participation: complete - patient participated  Level of consciousness: awake  Pain management: adequate  Airway patency: patent  Anesthetic complications: no  Cardiovascular status: stable  Respiratory status: acceptable  Hydration status: acceptable  Post anesthesia nausea and vomiting:  controlled      Vitals Value Taken Time   /67 1/23/2020 10:46 AM   Temp 36.6 °C (97.8 °F) 1/23/2020 10:10 AM   Pulse 74 1/23/2020 10:52 AM   Resp 19 1/23/2020 10:52 AM   SpO2 99 % 1/23/2020 10:52 AM   Vitals shown include unvalidated device data.

## 2020-01-23 NOTE — PROGRESS NOTES
conducted a pre-surgery visit with Avis Palmer, who is a 77 y. o.,male. The  provided the following Interventions:  Initiated a relationship of care and support. Plan:  Chaplains will continue to follow and will provide pastoral care on an as needed/requested basis.  recommends bedside caregivers page  on duty if patient shows signs of acute spiritual or emotional distress.     [address] [place], [place] [address]  [phone]

## 2020-01-23 NOTE — PROGRESS NOTES
Problem: Mobility Impaired (Adult and Pediatric)  Goal: *Acute Goals and Plan of Care (Insert Text)  Description  Physical Therapy Goals  Initiated 1/23/2020 and to be accomplished within 7 day(s)  1. Patient will move from supine to sit and sit to supine , scoot up and down and roll side to side in bed with modified independence. 2.  Patient will transfer from bed to chair and chair to bed with supervision/set-up using the least restrictive device. 3.  Patient will perform sit to stand with supervision/set-up. 4.  Patient will ambulate with supervision/set-up for 250 feet with the least restrictive device. 5.  Patient will ascend/descend 4 stairs with 1 handrail(s) with supervision/set-up. Prior Level of Function:   Patient was modified independence for all mobility including gait using single point cane. Patient lives with his wife in a two-story home, 14 steps to 2nd story with L HR and chair lift available, 1 step to enter home without HR. Outcome: Progressing Towards Goal     PHYSICAL THERAPY EVALUATION    Patient: Lucy Young (78 y.o. male)  Date: 1/23/2020  Primary Diagnosis: Primary osteoarthritis of left knee [M17.12]  Knee osteoarthritis [M17.10]  Procedure(s) (LRB):  LEFT TOTAL KNEE REPLACEMENT/DEPUY/2 SA'S/ADDUCTOR BLOCK/AQUAMANTYS (Left) Day of Surgery   Precautions: WBAT L LE,  Fall      ASSESSMENT :  PT orders received and patient cleared by nursing to participate with therapy. Patient is a 77 y.o. male admitted to the hospital due to s/p L TKA 1/23/2020 by Dr. Susana Perdue. Patient consents to PT evaluation and treatment. Patient received supine in bed with HOB elevated on 2L supplemental O2 via NC. Wife also present. Patient performed ankle pumps, quad sets, and heel slides to increase blood flow and knee ROM while supine in bed. Patient educated to perform these therapeutic exercises along with quad sets 10x/hour. L Knee AROM while supine: -15 to 75 degrees.     Patient performed sit to supine with CGA to decrease friction under the heel and assist with LE coordination. While seated EOB patient demonstrates good sitting balance and is able to scoot with standby assist. Patient instructed in heel slides while in seated position. L knee AROM while seated 80 degrees flexion. Patient noted to have decreased strength grossly in B LE; patient notes he also needs to have his R knee replaced. Patient performed sit to stand with min A for weight shifting, extension, and hand placement. Verbal cuing required for hand placement and sequencing. Patient ambulated a total of 150 feet with rolling walker. Patient initially needed Steffany with ambulation, and verbalized his step sequence while ambulating, but quickly became more confident and required less assistance (CGA). Patient ambulated with slightly antalgic gait, slow amanda and decreased stance time L. Upon returning to his room patient ambulated to his room and completed toileting, demonstrating fair-good dynamic standing balance without support. Patient performed stand to sit to recliner with CGA and cuing for hand placement and slow eccentric lowering. Patient then completed sit <> stand with CGA from recliner. Patient educated regarding importance of out of bed mobility 3-5x/day as well as performance of seated/supine therapeutic exercise. Wife educated regarding home setup and home mobility for when the patient returned home. Also informed wife that the walker from home is pediatric-sized and a standard rolling walker will need to be ordered. Session ended with patient seated upright in recliner with towel roll under ankle, call bell in reach, all needs met. Patient will benefit from skilled intervention to address the above impairments.   Patient's rehabilitation potential is considered to be Good  Factors which may influence rehabilitation potential include:   []         None noted  []         Mental ability/status  [] Medical condition  [x]         Home/family situation and support systems  []         Safety awareness  []         Pain tolerance/management  []         Other:      PLAN :  Recommendations and Planned Interventions:   [x]           Bed Mobility Training             [x]    Neuromuscular Re-Education  [x]           Transfer Training                   []    Orthotic/Prosthetic Training  [x]           Gait Training                          [x]    Modalities  [x]           Therapeutic Exercises           [x]    Edema Management/Control  [x]           Therapeutic Activities            [x]    Family Training/Education  [x]           Patient Education  []           Other (comment):    Frequency/Duration: Patient will be followed by physical therapy 1-2 times per day/4-7 days per week to address goals. Discharge Recommendations: Home Health  Further Equipment Recommendations for Discharge: rolling walker      SUBJECTIVE:   Patient stated Oh making fun of the [address] [place] [address],[address]? That's not cool. Carlyn Riser Carlyn Riser Lambert... it's a joke.  \"One small step, that's right, just like I'm going to the moon. \" \"I'm ready to run tomorrow! \"    OBJECTIVE DATA SUMMARY:     Past Medical History:   Diagnosis Date    Arthritis     Degenerative disc disease, lumbar     Diabetes ([facility] [address]     HLD (hyperlipidemia)     Hypertension     Intracranial hemorrhage, cerebellar ([facility] [address] 2018    no residual    PPD positive, treated 2018    was treated    Sleep apnea     on cpap    Spondylosis of lumbar spine      Past Surgical History:   Procedure Laterality Date    COLONOSCOPY  10/13/2015 Return 10/14/2025    Dr. Sotero Godinez; screening    HX COLONOSCOPY      HX KNEE ARTHROSCOPY Right 1986    right knee    HX WRIST FRACTURE TX Left     ORIF     Barriers to Learning/Limitations: yes;  altered mental status (patient followed all commands but still slightly \"loopy\" from anesthesia; will need to assess for education retention at next session)  Compensate with: Verbal Cues  Home Situation:  Home Situation  Home Environment: Private residence  # Steps to Enter: 1  Rails to Enter: No  One/Two Story Residence: Two story  # of Interior Steps: 15  Interior Rails: Left  Lift Chair Available: Yes  Living Alone: No  Support Systems: Spouse/Significant Other/Partner  Patient Expects to be Discharged to[de-identified] Private residence  Current DME Used/Available at Home: Cane, straight, CPAP, Grab bars, Glucometer, Lift chair, Raised toilet seat, Shower chair, Walker, rollator  Tub or Shower Type: Tub/Shower combination  Critical Behavior:  Neurologic State: Alert; Anesthetized  Orientation Level: Oriented X4  Cognition: Follows commands  Safety/Judgement: Fall prevention  Psychosocial  Patient Behaviors: Calm; Cooperative  Family  Behaviors: Calm; Cooperative;Supportive  Skin Condition/Temp: Dry;Warm  Family  Behaviors: Calm; Cooperative;Supportive  Skin Integrity: Incision (comment)(left knee)  Skin Integumentary  Skin Color: Appropriate for ethnicity  Skin Condition/Temp: Dry;Warm  Skin Integrity: Incision (comment)(left knee)     B LE Strength:    Strength: Generally decreased, functional              B LE Tone & Sensation:   Tone: Normal                      B LE Range Of Motion:  AROM: Generally decreased, functional                 Posture:  Posture (WDL): Within defined limits     Functional Mobility:  Bed Mobility:  Supine to Sit: Contact guard assistance  Scooting: Stand-by assistance  Transfers:  Sit to Stand: Minimum assistance   Stand to Sit: Contact guard assistance    Balance:   Sitting: Intact  Standing: Impaired  Standing - Static: Good  Standing - Dynamic : Fair;Good    Ambulation/Gait Training:  Distance (ft): 150 Feet (ft)  Assistive Device: Walker, rolling  Ambulation - Level of Assistance: Minimal assistance;Contact guard assistance  Gait Abnormalities: Decreased step clearance; Ataxic  Left Side Weight Bearing: As tolerated  Stance: Left decreased  Speed/Rosa: Slow     Therapeutic Exercises:   Reviewed and performed ankle pumps, quad sets, and heel slides to increase blood flow and circulation. Pain:  Pain level pre-treatment: 6/10 L knee  Pain level post-treatment: same  Pain Intervention(s) : Medication (see MAR); Rest, Repositioning  Response to intervention: Nurse notified, See doc flow    Activity Tolerance:   good  Please refer to the flowsheet for vital signs taken during this treatment. After treatment:   [x]         Patient left in no apparent distress sitting up in chair  []         Patient left in no apparent distress in bed  [x]         Call bell left within reach  [x]   Personal items in reach   [x]         Nursing notified Reese Chandler  []         Caregiver present  []         Bed/chair alarm activated  [x]         SCDs applied    COMMUNICATION/EDUCATION:   [x]         Role of Physical Therapy in the acute care setting. [x]         Fall prevention education was provided and the patient/caregiver indicated understanding. [x]         Patient/family have participated as able in goal setting and plan of care. [x]         Patient/family agree to work toward stated goals and plan of care. []         Patient understands intent and goals of therapy, but is neutral about his/her participation. []         Patient is unable to participate in goal setting/plan of care: ongoing with therapy staff. [x]         Out of bed with nursing assistance 3-5 times a day. []         Other:     Thank you for this referral.  Nina Raygoza DPT   Time Calculation: 44 mins      Eval Complexity: History: MEDIUM  Complexity : 1-2 comorbidities / personal factors will impact the outcome/ POC Exam:HIGH Complexity : 4+ Standardized tests and measures addressing body structure, function, activity limitation and / or participation in recreation  Presentation: LOW Complexity : Stable, uncomplicated  Clinical Decision Making:Low Complexity    Overall Complexity:LOW     A student participated in this treatment session. Per CMS Medicare statements and guidelines I certify that the following was true:   1. I was present and directly observed the entire session. 2. I made all skilled judgments and clinical decisions regarding care. 3. I am the practitioner responsible for assessment, treatment, and documentation.     Thank you,   Mari Smalls, PT, DPT

## 2020-01-23 NOTE — ANESTHESIA PREPROCEDURE EVALUATION
Relevant Problems   No relevant active problems       Anesthetic History   No history of anesthetic complications            Review of Systems / Medical History  Patient summary reviewed and pertinent labs reviewed    Pulmonary        Sleep apnea           Neuro/Psych   Within defined limits           Cardiovascular    Hypertension                   GI/Hepatic/Renal  Within defined limits              Endo/Other    Diabetes: type 2    Arthritis     Other Findings              Physical Exam    Airway  Mallampati: III  TM Distance: 4 - 6 cm  Neck ROM: normal range of motion   Mouth opening: Diminished (comment)     Cardiovascular    Rhythm: regular  Rate: normal         Dental    Dentition: Poor dentition     Pulmonary  Breath sounds clear to auscultation               Abdominal  GI exam deferred       Other Findings            Anesthetic Plan    ASA: 3  Anesthesia type: general      Post-op pain plan if not by surgeon: peripheral nerve block single    Induction: Intravenous  Anesthetic plan and risks discussed with: Patient

## 2020-01-24 VITALS
RESPIRATION RATE: 18 BRPM | DIASTOLIC BLOOD PRESSURE: 71 MMHG | SYSTOLIC BLOOD PRESSURE: 110 MMHG | HEIGHT: 69 IN | OXYGEN SATURATION: 97 % | TEMPERATURE: 97.4 F | WEIGHT: 213 LBS | HEART RATE: 94 BPM | BODY MASS INDEX: 31.55 KG/M2

## 2020-01-24 PROCEDURE — 97535 SELF CARE MNGMENT TRAINING: CPT

## 2020-01-24 PROCEDURE — 97116 GAIT TRAINING THERAPY: CPT

## 2020-01-24 PROCEDURE — 97110 THERAPEUTIC EXERCISES: CPT

## 2020-01-24 PROCEDURE — 74011250637 HC RX REV CODE- 250/637: Performed by: ORTHOPAEDIC SURGERY

## 2020-01-24 PROCEDURE — 97165 OT EVAL LOW COMPLEX 30 MIN: CPT

## 2020-01-24 PROCEDURE — 74011250636 HC RX REV CODE- 250/636: Performed by: ORTHOPAEDIC SURGERY

## 2020-01-24 RX ADMIN — OXYCODONE HYDROCHLORIDE 15 MG: 5 TABLET ORAL at 08:10

## 2020-01-24 RX ADMIN — LORATADINE 10 MG: 10 TABLET ORAL at 08:10

## 2020-01-24 RX ADMIN — TAMSULOSIN HYDROCHLORIDE 0.4 MG: 0.4 CAPSULE ORAL at 08:10

## 2020-01-24 RX ADMIN — SITAGLIPTIN 50 MG: 50 TABLET, FILM COATED ORAL at 08:10

## 2020-01-24 RX ADMIN — HYDROMORPHONE HYDROCHLORIDE 1 MG: 1 INJECTION, SOLUTION INTRAMUSCULAR; INTRAVENOUS; SUBCUTANEOUS at 05:57

## 2020-01-24 RX ADMIN — POLYETHYLENE GLYCOL 3350 17 G: 17 POWDER, FOR SOLUTION ORAL at 08:10

## 2020-01-24 RX ADMIN — ATORVASTATIN CALCIUM 10 MG: 10 TABLET, FILM COATED ORAL at 08:10

## 2020-01-24 RX ADMIN — OXYCODONE HYDROCHLORIDE 15 MG: 5 TABLET ORAL at 12:09

## 2020-01-24 RX ADMIN — ASPIRIN 325 MG ORAL TABLET 325 MG: 325 PILL ORAL at 08:10

## 2020-01-24 RX ADMIN — Medication 10 ML: at 07:03

## 2020-01-24 RX ADMIN — SENNOSIDES AND DOCUSATE SODIUM 1 TABLET: 8.6; 5 TABLET ORAL at 08:10

## 2020-01-24 RX ADMIN — LOSARTAN POTASSIUM 50 MG: 50 TABLET, FILM COATED ORAL at 08:10

## 2020-01-24 RX ADMIN — OXYCODONE HYDROCHLORIDE 15 MG: 5 TABLET ORAL at 16:31

## 2020-01-24 NOTE — PROGRESS NOTES
Bedside and Verbal shift change report given to Theresa Yeboah RN (oncoming nurse) by Elsie Laura (offgoing nurse). Report included the following information SBAR and Kardex.

## 2020-01-24 NOTE — PROGRESS NOTES
Problem: Mobility Impaired (Adult and Pediatric)  Goal: *Acute Goals and Plan of Care (Insert Text)  Description  Physical Therapy Goals  Initiated 1/23/2020 and to be accomplished within 7 day(s)  1. Patient will move from supine to sit and sit to supine , scoot up and down and roll side to side in bed with modified independence. 2.  Patient will transfer from bed to chair and chair to bed with supervision/set-up using the least restrictive device. 3.  Patient will perform sit to stand with supervision/set-up. 4.  Patient will ambulate with supervision/set-up for 250 feet with the least restrictive device. 5.  Patient will ascend/descend 4 stairs with 1 handrail(s) with supervision/set-up. Prior Level of Function:   Patient was modified independence for all mobility including gait using single point cane. Patient lives with his wife in a two-story home, 14 steps to 2nd story with L HR and chair lift available, 1 step to enter home without HR. Outcome: Progressing Towards Goal     PHYSICAL THERAPY TREATMENT    Patient: Mally Morris (78 y.o. male)  Date: 1/24/2020  Diagnosis: Primary osteoarthritis of left knee [M17.12]  Knee osteoarthritis [M17.10]   <principal problem not specified>  Procedure(s) (LRB):  LEFT TOTAL KNEE REPLACEMENT/DEPUY/2 SA'S/ADDUCTOR BLOCK/AQUAMANTYS (Left) 1 Day Post-Op  Precautions: Fall, WBAT    ASSESSMENT:  Patient is cleared by nursing for PT, and patient consents to therapy. Patient received seated upright in chair with ice pack to L knee. Patient performed scooting in chair with supervision, and sit to stand with CGA. Patient with some instability upon first coming to standing, but then demonstrates good static standing balance and fair+/good dynamic standing balance. Patient ambulated 200ft standby assist with rolling walker. Took one brief standing rest break after 100ft d/t reported dizziness. Patient declined opportunity to sit.  Patient continued ambulation to stairs. Patient needed occasional reminders to keep walker on ground and push it forward with hands instead of lifting. Patient ascended stairs with CGA/Steffany using L HR. Patient able to identify correct step to pattern for ascending stairs. Patient pushed off therapist's knee for A when stepping with R LE. Patient descended 4 steps with CGA/Steffany pushing on therapist's shoulder for balance/support when stepping down with R LE. Instructed patient in correct sequencing for descending steps. Patient returned to room and performed stand to sit with SBA with correct hand placement and safety. Patient able to negotiate stairs with assistance and increased time, but will likely need to use chair lift when first arriving home to avoid fatigue. Patient agreeable to this. Patient instructed in therapeutic exercise as detailed below for promotion of blood flow, ROM, and strength of L LE. Patient educated regarding no placement of pillow/towel toll under knee un order to prevent contracture and scar tissue buildup. Session ended with patient seated upright in chair with legs elevated, call bell in reach with all needs met. Patient cleared by physical therapy for safe DC home. Progression toward goals:   [x]      Improving appropriately and progressing toward goals  []      Improving slowly and progressing toward goals  []      Not making progress toward goals and plan of care will be adjusted     PLAN:  Patient continues to benefit from skilled intervention to address the above impairments. Continue treatment per established plan of care. Discharge Recommendations:  Home Health  Further Equipment Recommendations for Discharge:  rolling walker      SUBJECTIVE:   Patient stated Oh you're going to make me run today? Solomon Mendez    OBJECTIVE DATA SUMMARY:   Critical Behavior:  Neurologic State: Alert  Orientation Level: Oriented X4  Cognition: Follows commands  Safety/Judgement: Fall prevention  Functional Mobility Training:  Bed Mobility:  Supine to Sit: Contact guard assistance; Additional time(using RLE to assist with LLE)  Scooting: Contact guard assistance    Transfers:  Sit to Stand: Contact guard assistance  Stand to Sit: Stand-by assistance    Balance:  Sitting: Intact  Standing: Impaired  Standing - Static: Good  Standing - Dynamic : Fair   Range Of Motion:   AROM: Generally decreased, functional                        Ambulation/Gait Training:  Distance (ft): 200 Feet (ft)  Assistive Device: Walker, rolling  Ambulation - Level of Assistance: Stand-by assistance  Gait Abnormalities: Antalgic  Left Side Weight Bearing: As tolerated  Stance: Left decreased  Speed/Rosa: Slow  Step Length: Right shortened     Stairs:  Number of Stairs Trained: 4  Stairs - Level of Assistance: Contact guard assistance;Minimum assistance(pushing on PT's leg for A)  Rail Use: Left     Therapeutic Exercises:   Reviewed and performed ankle pumps to increase blood flow and circulation. EXERCISE   Sets   Reps   Active Active Assist   Passive Self ROM   Comments   Ankle Pumps 1 10  [x] [] [] []    Quad Sets/Glut Sets 1 10  [x] [] [] [] Hold for 5 secs   Hamstring Sets   [] [] [] []    Short Arc Quads   [] [] []     Heel Slides 1 10 [x] [] [] [] Hold for 5 secs   Straight Leg Raises   [] [] [] []    Hip Abd/adduction   [] [] [] []    Long Arc Quads   [] [] [] []    Seated Marching   [] [] [] []    Standing Marching   [] [] [] []       [] [] [] []        Pain:  Pain level pre-treatment: 7/10 L knee  Pain level post-treatment: same  Pain Intervention(s): Medication (see MAR); Rest, Ice, Repositioning   Response to intervention: Nurse notified, See doc flow    Activity Tolerance:   good  Please refer to the flowsheet for vital signs taken during this treatment.     After treatment:   [x] Patient left in no apparent distress sitting up in chair  [] Patient left in no apparent distress in bed  [x] Call bell left within reach  [x] Nursing notified Gianfranco Moreno  [x] Personal items in reach  [] Caregiver present  [] Bed/chair alarm activated  [] SCDs applied      COMMUNICATION/EDUCATION:   [x]         Role of Physical Therapy in the acute care setting. [x]         Fall prevention education was provided and the patient/caregiver indicated understanding. [x]         Patient/family have participated as able in working toward goals and plan of care. [x]         Patient/family agree to work toward stated goals and plan of care. []         Patient understands intent and goals of therapy, but is neutral about his/her participation. []         Patient is unable to participate in stated goals/plan of care: ongoing with therapy staff. [x]         Out of bed at least 3-5 times a day with nursing assistance. []         Other:        Monster Barakat DPT   Time Calculation: 33 mins    A student participated in this treatment session. Per CMS Medicare statements and guidelines I certify that the following was true:   1. I was present and directly observed the entire session. 2. I made all skilled judgments and clinical decisions regarding care. 3. I am the practitioner responsible for assessment, treatment, and documentation.     Thank you,   Katelin Blanco, PT, DPT

## 2020-01-24 NOTE — PROGRESS NOTES
Discharge order noted for today. Pt has been accepted to [facility] agency. Met with patient and wife and are agreeable to the transition plan today. Transport has been arranged through wife. Patient's discharge summary and home health  orders have been forwarded to [facility] home health  agency via que. Updated bedside RN,  to the transition plan. Discharge information has been documented on the AVS.   [employer] provided through [facility].     ANNI Mahoney  Case Management  [phone]

## 2020-01-24 NOTE — PROGRESS NOTES
Itz Elliott Rounded on post total knee replacement. Patient and family educated: Activity:   OOB for all meals,   Walk every hour to prevent blood clots, help move better and lessen stiffness. Bend knee 10 x /hr  Do not put anything under knee. Towel roll under ankle. VTE prophylaxis:   Use SCD pumps except when walking. Ankle pumps 10 times an hour at hospital & home. Take blood thinner medication as ordered by surgeon. Do not skip a dose. Pain Control:  Pain medications side effects discussed. Wean off narcotics ASAP. Use Tylenol ( 3000 mg/24 hours) , ice, distraction, moving, & change position to help with pain. Rest between activity. Don't get nauseated. Eat a snack before taking pain medication    Do not get constipated: take stool softener/mild laxative daily while on narcotics. Incentive Spirometry:    Use of incentive spirometer 10 x/hr. Demonstration  2000 ml x 3  Wound Care: Dressing intact opsite visible. Wound dressing saturated and changed, Scant bleeding note at site where drain pulled. New dressing applied with reinforced tape. .   Educated patient on how to manage dressing and/or incision per MD protocol. Keep dressing and/or dry and intact. No lotions, powders, creams to surgical leg. .    Diet:   Eat for healing. Protein heals bone/muscle. Drink 8 glasses of water a day. Patient Safety:   Call light & belongings in reach. Call for help when want to walk or get OOB. Educational material given. Patient agreed to keep doing everything at home to prevent complications & have a successful recovery. Patient verbalizing the importance of using incentive spirometer, ankle pumping, walking frequently, doing exercised at home twice a day, taking their anticoagulant per physician instruction, taking medication and drinking water to prevent complication, and eating protein for healing. Patient  verbalized understand.  Given the opportunity for asking questions.

## 2020-01-24 NOTE — HOME CARE
Discharge noted for today. Referral for [facility] for SN and PT - [place] knee protocol updated and emailed to central office. Patient has the following DME: received front wheeled walker from [facility] .  Dilan Metcalf, [facility] Liaison

## 2020-01-24 NOTE — OP NOTES
[address] [place] [address]   OPERATIVE REPORT    Name:  Kinza Her  MR#:   [id number]  :  1950  ACCOUNT #:  [de-identified]  DATE OF SERVICE:  2020    PREOPERATIVE DIAGNOSIS:  Osteoarthritis, left knee. POSTOPERATIVE DIAGNOSIS:  Osteoarthritis, left knee. PROCEDURE PERFORMED:  Left total knee arthroplasty. SURGEON:  Keturah Aragon MD    ASSISTANT:  Dalia Friday. ANESTHESIA:  General.    COMPLICATIONS:  None. SPECIMENS REMOVED:  None. IMPLANTS:  Size 7 left cemented femoral posterior stabilized component, size 7 cemented tibial base rotating platform, size 41 mm patella dome, size 7 x 8 mm tibial insert rotating platform, DePuy Attune rotating platform posterior stabilized system. ESTIMATED BLOOD LOSS:  100 mL. SUMMARY OF PROCEDURE:  After general anesthesia was induced, the patient's left knee was examined. Motion was 2 degrees to 130 degrees. There was varus alignment of the knee. The knee was prepped and draped in a routine sterile fashion. The limb was exsanguinated by elevation and the upper thigh tourniquet inflated to 350 mmHg. A midline incision was made and a medial parapatellar arthrotomy performed. There were severe cartilage erosions over the patella with significant patellofemoral spurring. There was eburnated bone over the entire medial compartment and lateral compartment erosions. Using the appropriate alignment guides, a distal femoral and proximal tibial cut were made. The tibia and femur were sized and further fashioned to accept the components. A trial reduction at this time showed significant medial tightness. This was relieved by elevation of the soft tissue off of the proximal medial tibia. Once this was stabilized, there was good motion with good stability. The patella was cut and sized and a trial showed full patellofemoral congruence.   The components were then cemented in place with antibiotic impregnated cement, after which further tibial trials were placed and the true tibial insert then used. At this time, motion was 0 degrees to 130 degrees. There was full stability. The wound was closed in layers over a Hemovac drain.         MD LATASHA [email or web address]  D:  01/23/2020 9:57  T:  01/23/2020 20:15  JOB #:  [id number]

## 2020-01-24 NOTE — PROGRESS NOTES
Recent Results (from the past 24 hour(s))   GLUCOSE, POC    Collection Time: 01/23/20  4:19 PM   Result Value Ref Range    Glucose (POC) 138 (H) 70 - 110 mg/dL       Patient Vitals for the past 24 hrs:   Temp Pulse Resp BP SpO2   01/24/20 1615 97.4 °F (36.3 °C) 94 18 110/71 97 %   01/24/20 1200 97.3 °F (36.3 °C) 97 18 128/68 97 %   01/24/20 0553 98.1 °F (36.7 °C) 100 18 129/65 95 %   01/23/20 2232 98.3 °F (36.8 °C) 93 18 121/68 93 %   01/23/20 1740 97.7 °F (36.5 °C) 82 18 117/84 98 %       Pain Level : 3    Wound clean, dry    Calf is soft    Flexion: 95    Will continue Physical Therapy. Recent Results (from the past 24 hour(s))   GLUCOSE, POC    Collection Time: 01/23/20  4:19 PM   Result Value Ref Range    Glucose (POC) 138 (H) 70 - 110 mg/dL       Patient Vitals for the past 24 hrs:   Temp Pulse Resp BP SpO2   01/24/20 1615 97.4 °F (36.3 °C) 94 18 110/71 97 %   01/24/20 1200 97.3 °F (36.3 °C) 97 18 128/68 97 %   01/24/20 0553 98.1 °F (36.7 °C) 100 18 129/65 95 %   01/23/20 2232 98.3 °F (36.8 °C) 93 18 121/68 93 %   01/23/20 1740 97.7 °F (36.5 °C) 82 18 117/84 98 %       Pain Level : 3    Wound clean, dry    Calf is soft    Flexion: 95    Will continue Physical Therapy.   Discharge today

## 2020-01-24 NOTE — PROGRESS NOTES
Problem: Mobility Impaired (Adult and Pediatric)  Goal: *Acute Goals and Plan of Care (Insert Text)  Description  Physical Therapy Goals  Initiated 1/23/2020 and to be accomplished within 7 day(s)  1. Patient will move from supine to sit and sit to supine , scoot up and down and roll side to side in bed with modified independence. 2.  Patient will transfer from bed to chair and chair to bed with supervision/set-up using the least restrictive device. 3.  Patient will perform sit to stand with supervision/set-up. 4.  Patient will ambulate with supervision/set-up for 250 feet with the least restrictive device. 5.  Patient will ascend/descend 4 stairs with 1 handrail(s) with supervision/set-up. -will continue at home as pt has a stair lift (pt requires contact guard assistance/minimal assistance this morning)    Prior Level of Function:   Patient was modified independence for all mobility including gait using single point cane. Patient lives with his wife in a two-story home, 14 steps to 2nd story with L HR and chair lift available, 1 step to enter home without HR. Outcome: Resolved/Met   PHYSICAL THERAPY TREATMENT AND DISCHARGE    Patient: Brandon Rios (78 y.o. male)  Date: 1/24/2020  Diagnosis: Primary osteoarthritis of left knee [M17.12]  Knee osteoarthritis [M17.10]   <principal problem not specified>  Procedure(s) (LRB):  LEFT TOTAL KNEE REPLACEMENT/DEPUY/2 SA'S/ADDUCTOR BLOCK/AQUAMANTYS (Left) 1 Day Post-Op  Precautions: Fall, WBAT    ASSESSMENT:  Patient is cleared by nursing for PT, and patient consents to therapy. Pt sitting up in chair with towel roll under ankle. Sit to stands improved to supervision no LOB. Gait in hallway 250 feet RW supervision/modified independent. Pt had no LOB and safe with gait. Reviewed and performed therex for increased ROM and strength to increase safety with functional mobility.  Pt ended therapy sitting in recliner with towel roll under ankle, ice donned, and all needs met. PLAN:  Maximum therapeutic gains met at current level of care and patient will be discharged from physical therapy at this time. Rationale for discharge:    [x]     Goals Achieved  []     [address]  []     Patient not participating in therapy  []     Other:  Discharge Recommendations:  Home Health  Further Equipment Recommendations for Discharge:  rolling walker     SUBJECTIVE:   Patient stated Supposed to be today (going home).     OBJECTIVE DATA SUMMARY:   Critical Behavior:  Neurologic State: Alert  Orientation Level: Oriented X4  Cognition: Follows commands  Safety/Judgement: Fall prevention  Functional Mobility Training:  Bed Mobility:     Supine to Sit: (pt reports no issues)  Scooting: Modified independent(in chair)    Transfers:  Sit to Stand: Supervision  Stand to Sit: Supervision          Balance:  Sitting: Intact  Standing: Impaired; With support  Standing - Static: Good  Standing - Dynamic : Good;Fair     Ambulation/Gait Training:  Distance (ft): 250 Feet (ft)  Assistive Device: Walker, rolling  Ambulation - Level of Assistance: Supervision  Gait Abnormalities: Step to gait  Left Side Weight Bearing: As tolerated  Stance: Left decreased  Speed/Rosa: Pace decreased (<100 feet/min)  Step Length: Right shortened;Left shortened    Therapeutic Exercises:   Reviewed and performed ankle pumps, SLR, quad sets, and heel slides (seated). B LE x 10 reps    Pain:  Pain level pre-treatment: 5/10  L knee  Pain level post-treatment: 5/10  L knee  Pain Intervention(s): Medication (see MAR); Rest, Ice, Repositioning   Response to intervention: Nurse notified, See doc flow    Activity Tolerance:   good  Please refer to the flowsheet for vital signs taken during this treatment.   After treatment:   [x] Patient left in no apparent distress sitting up in chair  [] Patient left in no apparent distress in bed  [x] Call bell left within reach  [x] Personal items in reach  [x] Nursing notified Gordon Glasgow  [x] Caregiver present  [] Bed/chair alarm activated  [] SCDs applied      COMMUNICATION/EDUCATION:   [x]         Role of Physical Therapy in the acute care setting. [x]         Fall prevention education was provided and the patient/caregiver indicated understanding. [x]         Patient/family have participated as able and agree with findings and recommendations. []         Patient is unable to participate in plan of care at this time. [x]         Out of bed at least 3-5 times a day with nursing assistance.   []         Other:        Catrachito Luz, PT, DPT   Time Calculation: 24 mins

## 2020-01-24 NOTE — DIABETES MGMT
GLYCEMIC CONTROL AND NUTRITION    Assessment/Recommendations:  Blood glucose this am 134 mg/dl  Blood glucose currently controlled  Noted pt for discharge today. Will continue inpatient monitoring. Most recent blood glucose values:  Results for Ermias Swanson (MRN [id number]) as of 1/24/2020 12:24   Ref. Range 8/11/2015 08:45 10/13/2015 13:07 1/23/2020 06:16 1/23/2020 10:46 1/23/2020 16:19   GLUCOSE,FAST - POC Latest Ref Range: 70 - 110 mg/dL 125 94 134 (H) 145 (H) 138 (H)     Current A1C of 6.3 % is equivalent to average blood glucose of 134_mg/dl over the past 2-3 months. Current hospital diabetes medications:   Metformin 500 mg daily with dinner  sitagliptin 50 mg daily    Home diabetes medications:  Metformin  mg 2 tablets daily with dinner  Januvia 50 mg daily  Diet:    Regular diet ordered. Would benefit from consistent carb diet and no concentrated sweets.   Noted plan for discharge today  Education:  ____Refer to Diabetes Education Record             __x_Education not indicated at this time      Verner Fleck, [facility] CDE  Ext [phone]

## 2020-01-24 NOTE — PROGRESS NOTES
OCCUPATIONAL THERAPY EVALUATION/DISCHARGE    Patient: Bernard Og (78 y.o. male)  Date: 1/24/2020  Primary Diagnosis: Primary osteoarthritis of left knee [M17.12]  Knee osteoarthritis [M17.10]  Procedure(s) (LRB):  LEFT TOTAL KNEE REPLACEMENT/DEPUY/2 SA'S/ADDUCTOR BLOCK/AQUAMANTYS (Left) 1 Day Post-Op   Precautions: Fall, WBAT(LLE)  PLOF: Patient was independent with self-care and used a SPC for functional mobility PTA. ASSESSMENT AND RECOMMENDATIONS:  Pt cleared to participate in OT evaluation by RN. Upon entering the room, pt was supine in bed, alert, and agreeable to participate in OT evaluation. Patient educated on weight-bearing status, importance of ice, and safety around the house. Pt educated on energy conservation techniques, pursed lip breathing, and self pacing during daily activities as he was observed holding his breath with bed mobility this session. Patient educated on AE for LBD and how to don/doff socks, pants, and underwear. Patient practiced LB dressing with use of AE given (reacher, sock aid) after demonstration. No assist needed after practice. Patient also given a long handled sponge and long handled shoe horn after demonstration. Patient is modified independent - independent with basic self-care, contact guard assist, additional time with bed mobility and contact guard assist with functional transfers using rolling walker. The pt presents with good static standing and fair dynamic standing balance, however will defer to PT for functional balance and functional mobility tasks. Based on the objective data described below, the patient presents with no deficits that impede pt function with ADLs, functional transfers, and functional mobility. At this time patient is safe to d/c home with family support. OT to d/c from caseload at this time. Skilled occupational therapy is not indicated at this time.   Discharge Recommendations: Home Health  Further Equipment Recommendations for Discharge: rolling walker      SUBJECTIVE:   Patient stated I need the reach thingy, what do you call it?     OBJECTIVE DATA SUMMARY:     Past Medical History:   Diagnosis Date    Arthritis     Degenerative disc disease, lumbar     Diabetes ([facility]     HLD (hyperlipidemia)     Hypertension     Intracranial hemorrhage, cerebellar ([facility] 2018    no residual    PPD positive, treated 2018    was treated    Sleep apnea     on cpap    Spondylosis of lumbar spine      Past Surgical History:   Procedure Laterality Date    COLONOSCOPY  10/13/2015 Return 10/14/2025    Dr. Magda Rangel; screening    HX COLONOSCOPY      HX KNEE ARTHROSCOPY Right 1986    right knee    HX WRIST FRACTURE TX Left     ORIF     Barriers to Learning/Limitations: None  Compensate with: visual, verbal, tactile, kinesthetic cues/model    Home Situation:   Home Situation  Home Environment: Private residence  # Steps to Enter: 1  Rails to Enter: No  One/Two Story Residence: Two story  # of Interior Steps: 15  Interior Rails: Left  Lift Chair Available: Yes  Living Alone: No  Support Systems: Spouse/Significant Other/Partner  Patient Expects to be Discharged to[de-identified] Private residence  Current DME Used/Available at Home: Cane, straight, CPAP, Grab bars, Glucometer, Lift chair, Raised toilet seat, Shower chair, Walker, rollator  Tub or Shower Type: Tub/Shower combination  [x]     Right hand dominant   []     Left hand dominant    Cognitive/Behavioral Status:  Neurologic State: Alert  Orientation Level: Oriented X4  Cognition: Follows commands  Safety/Judgement: Fall prevention    Skin: Intact  Edema: None noted    Vision/Perceptual:    Acuity: Within Defined Limits      Coordination: BUE  Fine Motor Skills-Upper: Left Intact; Right Intact    Gross Motor Skills-Upper: Left Intact; Right Intact    Balance:  Sitting: Intact  Standing: Impaired  Standing - Static: Good  Standing - Dynamic : Fair    Strength: BUE  Strength: Generally decreased, functional    Tone & Sensation: BUE  Tone: Normal    Range of Motion: BUE  AROM: Within functional limits      Functional Mobility and Transfers for ADLs:  Bed Mobility:  Supine to Sit: Contact guard assistance; Additional time(using RLE to assist with LLE)  Scooting: Contact guard assistance    Transfers:  Sit to Stand: Contact guard assistance  Stand to Sit: Contact guard assistance    ADL Assessment:  Feeding: Independent    Oral Facial Hygiene/Grooming: Independent    Bathing: Modified independent; Adaptive equipment    Upper Body Dressing: Independent    Lower Body Dressing: Modified independent; Adaptive equipment    Toileting: Modified independent    ADL Intervention:  Upper Body Dressing Assistance  Dressing Assistance: [address] [id number]: Independent    Lower Body Dressing Assistance  Dressing Assistance: Modified independent  Pants With Elastic Waist: Modified independent; Compensatory technique training(simulation)  Socks: Modified independent  Leg Crossed Method Used: No  Position Performed: Seated edge of bed;Standing  Adaptive Equipment Used: Reacher;Sock aid    Cognitive Retraining  Safety/Judgement: Fall prevention    Pain:  Pain level pre-treatment: 6/10, LLE   Pain level post-treatment:8/10, LLE  Pain Intervention(s): Medication (see MAR); Response to intervention: See doc flow    Activity Tolerance:   Fair+, limited by pain, RN notified    Please refer to the flowsheet for vital signs taken during this treatment. After treatment:   [x]  Patient left in no apparent distress sitting up in chair  []  Patient left in no apparent distress in bed  [x]  Call bell left within reach  [x]  Nursing notified  []  Caregiver present  []  Bed alarm activated    COMMUNICATION/EDUCATION:   [x]      Role of Occupational Therapy in the acute care setting  [x]      Home safety education was provided and the patient/caregiver indicated understanding.   [x]      Patient/family have participated as able and agree with findings and recommendations. []      Patient is unable to participate in plan of care at this time. Thank you for this referral.  Cecelia Shaw OTR/L  Time Calculation: 27 mins      Eval Complexity: History: LOW Complexity : Brief history review ; Examination: LOW Complexity : 1-3 performance deficits relating to physical, cognitive , or psychosocial skils that result in activity limitations and / or participation restrictions ;    Decision Making:LOW Complexity : No comorbidities that affect functional and no verbal or physical assistance needed to complete eval tasks

## 2020-01-25 ENCOUNTER — HOME CARE VISIT (OUTPATIENT)
Dept: SCHEDULING | Facility: HOME HEALTH | Age: 70
End: 2020-01-25
Payer: MEDICARE

## 2020-01-25 VITALS
DIASTOLIC BLOOD PRESSURE: 70 MMHG | SYSTOLIC BLOOD PRESSURE: 122 MMHG | TEMPERATURE: 98.9 F | HEART RATE: 94 BPM | OXYGEN SATURATION: 96 %

## 2020-01-25 PROCEDURE — G0299 HHS/HOSPICE OF RN EA 15 MIN: HCPCS

## 2020-01-25 PROCEDURE — G0151 HHCP-SERV OF PT,EA 15 MIN: HCPCS

## 2020-01-25 PROCEDURE — 400013 HH SOC

## 2020-01-25 PROCEDURE — 3331090001 HH PPS REVENUE CREDIT

## 2020-01-25 PROCEDURE — 3331090002 HH PPS REVENUE DEBIT

## 2020-01-26 ENCOUNTER — HOME CARE VISIT (OUTPATIENT)
Dept: SCHEDULING | Facility: HOME HEALTH | Age: 70
End: 2020-01-26
Payer: MEDICARE

## 2020-01-26 VITALS
SYSTOLIC BLOOD PRESSURE: 118 MMHG | OXYGEN SATURATION: 93 % | HEART RATE: 80 BPM | DIASTOLIC BLOOD PRESSURE: 74 MMHG | TEMPERATURE: 98.8 F

## 2020-01-26 PROCEDURE — G0157 HHC PT ASSISTANT EA 15: HCPCS

## 2020-01-26 PROCEDURE — 3331090001 HH PPS REVENUE CREDIT

## 2020-01-26 PROCEDURE — 3331090002 HH PPS REVENUE DEBIT

## 2020-01-27 ENCOUNTER — HOME CARE VISIT (OUTPATIENT)
Dept: HOME HEALTH SERVICES | Facility: HOME HEALTH | Age: 70
End: 2020-01-27
Payer: MEDICARE

## 2020-01-27 ENCOUNTER — PATIENT OUTREACH (OUTPATIENT)
Dept: CASE MANAGEMENT | Age: 70
End: 2020-01-27

## 2020-01-27 PROCEDURE — G0157 HHC PT ASSISTANT EA 15: HCPCS

## 2020-01-27 PROCEDURE — 3331090002 HH PPS REVENUE DEBIT

## 2020-01-27 PROCEDURE — 3331090001 HH PPS REVENUE CREDIT

## 2020-01-27 PROCEDURE — A6255 ABSORPT DRG >16<=48 IN W/BDR: HCPCS

## 2020-01-27 NOTE — PROGRESS NOTES
Hospital Discharge Follow-Up      Date/Time:  2020 10:22 AM      [facility] and discharged on 20 for Left Knee Replacement. The physician discharge summary was not available at the time of outreach. Patient was contacted within 1 business day of discharge. Top Challenges reviewed with the provider   None noted    Advance Care Planning:   Does patient have an Advance Directive:  not on file          Care Transition Nurse (CTN) contacted the patient by telephone to perform post hospital discharge assessment. Verified name and  with patient as identifiers. Provided introduction to self, and explanation of the CTN role. Patient states that he is doing well. Patient denied any issues at this time. Patient states that home health PT just came in today and told that he is progressing very well. Patient states that he is walking with walker without any problem. Pt. lives with spouse in a private residence and his spouse will take him to his appt. Patient states that he is taking Aspirin 325 mg. Patient reported that his PCP is Ms. Anand Carbajal. Informed Pt. To make his insurance aware. Patient states that he has an appt with PCP on 20 and Surgeon on 20. Patient declined medrec,     No questions, concerns, needs and/or assistance at this time as per Pt. Patient kept the conversation short. Patient received hospital discharge instructions. CTN reviewed discharge instructions and red flags with patient who verbalized understanding. Patient given an opportunity to ask questions and does not have any further questions or concerns at this time. The patient agrees to contact the PCP office for questions related to their healthcare. CTN provided contact information for future reference.        Current Outpatient Medications   Medication Sig    HYDROcodone-acetaminophen (NORCO) 5-325 mg per tablet Take  by mouth two (2) times daily as needed.  amLODIPine (NORVASC) 5 mg tablet Take 1 Tab by mouth daily.  atorvastatin (LIPITOR) 10 mg tablet Take 1 Tab by mouth daily.  losartan (COZAAR) 50 mg tablet Take 1 Tab by mouth daily.  cetirizine (ZYRTEC) 10 mg tablet Take 1 Tab by mouth daily.  metoprolol tartrate (LOPRESSOR) 25 mg tablet Take 1 Tab by mouth two (2) times a day.  metFORMIN ER (GLUCOPHAGE XR) 500 mg tablet Take 2 tabs po daily with a meal (Patient taking differently: daily (with dinner). Take 2 tabs po daily with a meal)    SITagliptin (JANUVIA) 50 mg tablet Take 1 Tab by mouth daily.  sildenafil citrate (VIAGRA) 100 mg tablet Take 1 Tab by mouth as needed (E.D.). Once daily    ketoconazole (NIZORAL) 2 % shampoo Shampoo daily, lather and let sit for 5 minutes, then rinse  Indications: Dandruff    tamsulosin (FLOMAX) 0.4 mg capsule Take 1 Cap by mouth daily.  albuterol (PROVENTIL HFA, VENTOLIN HFA, PROAIR HFA) 90 mcg/actuation inhaler Take 2 Puffs by inhalation every six (6) hours as needed for Wheezing.  melatonin 5 mg tablet Take  by mouth. No current facility-administered medications for this visit.       [facility] follow up appointment(s):   Future Appointments   Date Time Provider Department Center   1/27/2020 To Be Determined Flores Greer, [facility] [address] [facility]   1/28/2020 To Be Determined Alfredo Rosales, [facility] [address]   2/4/2020 To Be Determined Debby Bean [address]   4/23/2020 11:30 AM Michelle Bello MD [address] [place] [address]

## 2020-01-28 ENCOUNTER — HOME CARE VISIT (OUTPATIENT)
Dept: SCHEDULING | Facility: HOME HEALTH | Age: 70
End: 2020-01-28
Payer: MEDICARE

## 2020-01-28 PROCEDURE — G0157 HHC PT ASSISTANT EA 15: HCPCS

## 2020-01-28 PROCEDURE — 3331090001 HH PPS REVENUE CREDIT

## 2020-01-28 PROCEDURE — 3331090002 HH PPS REVENUE DEBIT

## 2020-01-29 ENCOUNTER — HOME CARE VISIT (OUTPATIENT)
Dept: SCHEDULING | Facility: HOME HEALTH | Age: 70
End: 2020-01-29
Payer: MEDICARE

## 2020-01-29 VITALS
RESPIRATION RATE: 16 BRPM | OXYGEN SATURATION: 96 % | TEMPERATURE: 98.4 F | DIASTOLIC BLOOD PRESSURE: 70 MMHG | SYSTOLIC BLOOD PRESSURE: 122 MMHG | HEART RATE: 94 BPM

## 2020-01-29 PROCEDURE — 3331090001 HH PPS REVENUE CREDIT

## 2020-01-29 PROCEDURE — 3331090002 HH PPS REVENUE DEBIT

## 2020-01-29 PROCEDURE — G0157 HHC PT ASSISTANT EA 15: HCPCS

## 2020-01-30 ENCOUNTER — HOME CARE VISIT (OUTPATIENT)
Dept: SCHEDULING | Facility: HOME HEALTH | Age: 70
End: 2020-01-30
Payer: MEDICARE

## 2020-01-30 VITALS
OXYGEN SATURATION: 97 % | DIASTOLIC BLOOD PRESSURE: 80 MMHG | SYSTOLIC BLOOD PRESSURE: 134 MMHG | TEMPERATURE: 98.9 F | HEART RATE: 77 BPM | RESPIRATION RATE: 16 BRPM

## 2020-01-30 PROCEDURE — 3331090001 HH PPS REVENUE CREDIT

## 2020-01-30 PROCEDURE — G0299 HHS/HOSPICE OF RN EA 15 MIN: HCPCS

## 2020-01-30 PROCEDURE — 3331090002 HH PPS REVENUE DEBIT

## 2020-01-30 PROCEDURE — G0157 HHC PT ASSISTANT EA 15: HCPCS

## 2020-01-30 PROCEDURE — A6213 FOAM DRG >16<=48 SQ IN W/BDR: HCPCS

## 2020-01-31 ENCOUNTER — HOME CARE VISIT (OUTPATIENT)
Dept: SCHEDULING | Facility: HOME HEALTH | Age: 70
End: 2020-01-31
Payer: MEDICARE

## 2020-01-31 VITALS
OXYGEN SATURATION: 94 % | SYSTOLIC BLOOD PRESSURE: 120 MMHG | DIASTOLIC BLOOD PRESSURE: 64 MMHG | HEART RATE: 70 BPM | TEMPERATURE: 98.3 F

## 2020-01-31 VITALS
DIASTOLIC BLOOD PRESSURE: 55 MMHG | OXYGEN SATURATION: 96 % | TEMPERATURE: 98.7 F | SYSTOLIC BLOOD PRESSURE: 118 MMHG | HEART RATE: 77 BPM | HEART RATE: 65 BPM | DIASTOLIC BLOOD PRESSURE: 74 MMHG | TEMPERATURE: 96.9 F | DIASTOLIC BLOOD PRESSURE: 70 MMHG | OXYGEN SATURATION: 94 % | HEART RATE: 80 BPM | OXYGEN SATURATION: 94 % | SYSTOLIC BLOOD PRESSURE: 118 MMHG | SYSTOLIC BLOOD PRESSURE: 144 MMHG | TEMPERATURE: 98.9 F | TEMPERATURE: 97.2 F | OXYGEN SATURATION: 97 % | DIASTOLIC BLOOD PRESSURE: 80 MMHG | HEART RATE: 72 BPM | SYSTOLIC BLOOD PRESSURE: 134 MMHG | RESPIRATION RATE: 16 BRPM

## 2020-01-31 PROCEDURE — 3331090002 HH PPS REVENUE DEBIT

## 2020-01-31 PROCEDURE — 3331090001 HH PPS REVENUE CREDIT

## 2020-01-31 PROCEDURE — G0151 HHCP-SERV OF PT,EA 15 MIN: HCPCS

## 2020-02-01 ENCOUNTER — HOME CARE VISIT (OUTPATIENT)
Dept: SCHEDULING | Facility: HOME HEALTH | Age: 70
End: 2020-02-01
Payer: MEDICARE

## 2020-02-01 PROCEDURE — 3331090002 HH PPS REVENUE DEBIT

## 2020-02-01 PROCEDURE — G0157 HHC PT ASSISTANT EA 15: HCPCS

## 2020-02-01 PROCEDURE — 3331090001 HH PPS REVENUE CREDIT

## 2020-02-02 ENCOUNTER — HOME CARE VISIT (OUTPATIENT)
Dept: SCHEDULING | Facility: HOME HEALTH | Age: 70
End: 2020-02-02
Payer: MEDICARE

## 2020-02-02 VITALS
OXYGEN SATURATION: 94 % | HEART RATE: 97 BPM | SYSTOLIC BLOOD PRESSURE: 138 MMHG | DIASTOLIC BLOOD PRESSURE: 76 MMHG | TEMPERATURE: 99 F

## 2020-02-02 PROCEDURE — 3331090002 HH PPS REVENUE DEBIT

## 2020-02-02 PROCEDURE — 3331090001 HH PPS REVENUE CREDIT

## 2020-02-02 PROCEDURE — G0157 HHC PT ASSISTANT EA 15: HCPCS

## 2020-02-03 ENCOUNTER — HOME CARE VISIT (OUTPATIENT)
Dept: SCHEDULING | Facility: HOME HEALTH | Age: 70
End: 2020-02-03
Payer: MEDICARE

## 2020-02-03 VITALS
DIASTOLIC BLOOD PRESSURE: 76 MMHG | SYSTOLIC BLOOD PRESSURE: 136 MMHG | HEART RATE: 64 BPM | TEMPERATURE: 98 F | OXYGEN SATURATION: 96 %

## 2020-02-03 PROCEDURE — 3331090001 HH PPS REVENUE CREDIT

## 2020-02-03 PROCEDURE — G0157 HHC PT ASSISTANT EA 15: HCPCS

## 2020-02-03 PROCEDURE — 3331090002 HH PPS REVENUE DEBIT

## 2020-02-04 ENCOUNTER — HOME CARE VISIT (OUTPATIENT)
Dept: SCHEDULING | Facility: HOME HEALTH | Age: 70
End: 2020-02-04
Payer: MEDICARE

## 2020-02-04 PROCEDURE — 3331090001 HH PPS REVENUE CREDIT

## 2020-02-04 PROCEDURE — G0157 HHC PT ASSISTANT EA 15: HCPCS

## 2020-02-04 PROCEDURE — 3331090002 HH PPS REVENUE DEBIT

## 2020-02-05 ENCOUNTER — HOME CARE VISIT (OUTPATIENT)
Dept: SCHEDULING | Facility: HOME HEALTH | Age: 70
End: 2020-02-05
Payer: MEDICARE

## 2020-02-05 VITALS
TEMPERATURE: 97.3 F | HEART RATE: 61 BPM | OXYGEN SATURATION: 98 % | DIASTOLIC BLOOD PRESSURE: 58 MMHG | OXYGEN SATURATION: 95 % | TEMPERATURE: 98.2 F | HEART RATE: 66 BPM | DIASTOLIC BLOOD PRESSURE: 82 MMHG | SYSTOLIC BLOOD PRESSURE: 140 MMHG | SYSTOLIC BLOOD PRESSURE: 140 MMHG

## 2020-02-05 PROCEDURE — 3331090002 HH PPS REVENUE DEBIT

## 2020-02-05 PROCEDURE — 3331090001 HH PPS REVENUE CREDIT

## 2020-02-05 PROCEDURE — G0157 HHC PT ASSISTANT EA 15: HCPCS

## 2020-02-06 ENCOUNTER — HOME CARE VISIT (OUTPATIENT)
Dept: SCHEDULING | Facility: HOME HEALTH | Age: 70
End: 2020-02-06
Payer: MEDICARE

## 2020-02-06 VITALS
HEART RATE: 68 BPM | DIASTOLIC BLOOD PRESSURE: 70 MMHG | SYSTOLIC BLOOD PRESSURE: 122 MMHG | TEMPERATURE: 97.4 F | OXYGEN SATURATION: 98 %

## 2020-02-06 PROCEDURE — 3331090002 HH PPS REVENUE DEBIT

## 2020-02-06 PROCEDURE — G0151 HHCP-SERV OF PT,EA 15 MIN: HCPCS

## 2020-02-06 PROCEDURE — 3331090001 HH PPS REVENUE CREDIT

## 2020-02-06 PROCEDURE — G0162 HHC RN E&M PLAN SVS, 15 MIN: HCPCS

## 2020-02-07 VITALS
SYSTOLIC BLOOD PRESSURE: 122 MMHG | HEART RATE: 68 BPM | RESPIRATION RATE: 18 BRPM | TEMPERATURE: 97.4 F | DIASTOLIC BLOOD PRESSURE: 70 MMHG | OXYGEN SATURATION: 98 %

## 2020-02-07 VITALS
DIASTOLIC BLOOD PRESSURE: 58 MMHG | HEART RATE: 73 BPM | TEMPERATURE: 97.6 F | SYSTOLIC BLOOD PRESSURE: 120 MMHG | OXYGEN SATURATION: 94 %

## 2020-02-10 ENCOUNTER — HOSPITAL ENCOUNTER (OUTPATIENT)
Dept: PHYSICAL THERAPY | Age: 70
Discharge: HOME | End: 2020-02-10
Payer: MEDICARE

## 2020-02-10 PROCEDURE — 97110 THERAPEUTIC EXERCISES: CPT

## 2020-02-10 PROCEDURE — 97162 PT EVAL MOD COMPLEX 30 MIN: CPT

## 2020-02-10 NOTE — PROGRESS NOTES
PHYSICAL THERAPY - DAILY TREATMENT NOTE    Patient Name: Saad Wilson        Date: 2/10/2020  : 1950   yes Patient  Verified  Visit #:     Insurance: Payor: Sarath Farias / Plan: [place] MED[place]ARE PART [place] & B / Product Type: Medicare /      In time: 416 Out time: 4475   Total Treatment Time: 30     Medicare/BCBS Time Tracking (below)   Total Timed Codes (min):  na 1:1 Treatment Time:  na     TREATMENT AREA =  Pain in left knee [M25.562]    SUBJECTIVE  Pain Level (on 0 to 10 scale):  3-4   10   Medication Changes/New allergies or changes in medical history, any new surgeries or procedures?    no  If yes, update Summary List   Subjective Functional Status/Changes:  []  No changes reported     See POC          OBJECTIVE    10 min Therapeutic Exercise:  [x]  See flow sheet   Rationale:      increase ROM and increase strength to improve the patients ability to perform walking activities. Billed With/As:   [x] TE   [] TA   [] Neuro   [] Self Care Patient Education: [x] Review HEP    [] Progressed/Changed HEP based on:   [] positioning   [] body mechanics   [] transfers   [] heat/ice application    [] other:      Other Objective/Functional Measures:    See PN     Post Treatment Pain Level (on 0 to 10) scale:   3-4   10     ASSESSMENT  Assessment/Changes in Function:     See PN     []  See Progress Note/Recertification   Patient will continue to benefit from skilled PT services to modify and progress therapeutic interventions, address functional mobility deficits, address ROM deficits, address strength deficits, analyze and address soft tissue restrictions, analyze and cue movement patterns, analyze and modify body mechanics/ergonomics and assess and modify postural abnormalities to attain remaining goals.    Progress toward goals / Updated goals:    See PN     PLAN  [x]  Upgrade activities as tolerated yes Continue plan of care   []  Discharge due to :    []  Other:      Therapist: Eliane Dao David Quinn, PT    Date: 2/10/2020 Time: 10:26 AM     Future Appointments   Date Time Provider Leanne Yamileth   2/13/2020 10:30 AM Dank Magdaleno, PT [facility]   2/18/2020  8:30 AM Dank Magdaleno, PT [facility]   2/20/2020 11:00 AM Dank Magdaleno, PT [facility]   2/24/2020  3:00 PM Dank Magdaleno, PT [facility]   2/28/2020 10:30 AM Dank Magdaleno, PT [facility]   3/2/2020  3:30 PM Dank Magdaleno, PT [facility]   3/5/2020  3:30 PM Dank Magdaleno, PT [facility]   3/9/2020  3:30 PM Dank Magdaleno, PT [facility]   3/12/2020  2:30 PM Dank Magdaleno, PT [facility]   4/23/2020 11:30 AM Yenny Palmer MD [address] [place] [address]

## 2020-02-10 NOTE — PROGRESS NOTES
[facility] [place], [address] [facility]       Phone: [phone]  Fax: [phone] / 64[address] [facility]  Patient Name: Mandeep Osman : 1950   Medical   Diagnosis: L TKA Treatment Diagnosis: Pain in left knee [M25.562]   Onset Date: 20     Referral Source: Brandon Zhang MD [facility]): 2/10/2020   Prior Hospitalization: See medical history Provider #: [id number]   Prior Level of Function: Chronic history of difficulty with prolonged walking, standing and stair negotiation   Comorbidities: HTN, Type II Diabetes, R knee pain   Medications: Verified on Patient Summary List   The Plan of Care and following information is based on the information from the initial evaluation.   ===========================================================================================  Assessment / key information:  Patient is a 77year old male presenting to therapy s/p L TKA on 20. Patient reports a chronic history of B knee pain secondary to OA. Patient states he has been doing well since his surgery and had HHPT for about 10 days. Patient is currently walking with a SPC and denies experiencing any instability. Patient denies any signs or symptoms of infection or DVT. Patient reports increased difficulty with prolonged walking, standing and stair negotiation. Patient notes symptoms feel better with rest. Patient rates pain at worst 3-4/10 and 2/10 at best.   Objective Data: Inspection-Patient ambulates with SPC in R UE. Post op incision over L anterior knee consistent with surgical procedure. Steri strips present over distal 3/4 incision, wound is closed with no signs of active drainage or infection. Reduced stance time on the L, reduced push off on the L. Knee AROM: R 0-120, L 6-105 (P!). Fair quadriceps set on the L, able to perform a SLR with minor extensor lag. Flexibility Testing: moderate restriction to L gastroc/soleus. Mid patella girth measurement: R 43.5 cm, 47 cm. FOTO:21/100. Patient educated on diagnosis, prognosis, POC and HEP. Patient issued copy of HEP and denied additional questions. Patient will benefit from skilled PT in order to address these impairments and functional limitations.   ===========================================================================================  Eval Complexity: History HIGH Complexity :3+ comorbidities / personal factors will impact the outcome/ POC ;  Examination  HIGH Complexity : 4+ Standardized tests and measures addressing body structure, function, activity limitation and / or participation in recreation ; Presentation MEDIUM Complexity : Evolving with changing characteristics ; Decision Making HIGH Complexity : FOTO score of 1- 25 ; Overall Complexity MEDIUM  Problem List: pain affecting function, decrease ROM, decrease strength, edema affecting function, impaired gait/ balance, decrease ADL/ functional abilitiies, decrease activity tolerance, decrease flexibility/ joint mobility and decrease transfer abilities   Treatment Plan may include any combination of the following: Therapeutic exercise, Therapeutic activities, Neuromuscular re-education, Physical agent/modality, Gait/balance training, Manual therapy, Patient education, Functional mobility training and Stair training  Patient / Family readiness to learn indicated by: asking questions, trying to perform skills and interest  Persons(s) to be included in education: patient (P)  Barriers to Learning/Limitations: no  Measures taken:    Patient Goal (s): More movement, no more pain   Patient self reported health status: good  Rehabilitation Potential: good   Short Term Goals: To be accomplished in  3  weeks:  1. Patient will demonstrate independence with HEP for self management of symptoms.   2. Patient will improve L knee AROM to 0-120 in order to improve tolerance to walking activities.  Long Term Goals: To be accomplished in  6  weeks:  1. Patient will improve FOTO to >/= 46/100 in order to improve quality of life. 2. Patient will demonstrate ability to ambulate with normal mechanics and community distances in order to improve tolerance to recreational activities. 3. Patient will report reduction in pain at worst to 0-1/10 in order to improve tolerance to household activities. Frequency / Duration:   Patient to be seen  2  times per week for 6  weeks:  Patient / Caregiver education and instruction: self care, activity modification and exercises  G-Codes (GP): andrew  Therapist Signature: Connor Arroyo PT Date: 6/38/5161   Certification Period: 2/10/20-5/8/20 Time: 10:17 AM   ===========================================================================================  I certify that the above Physical Therapy Services are being furnished while the patient is under my care. I agree with the treatment plan and certify that this therapy is necessary. Physician Signature:        Date:       Time:     Please sign and return to [facility] or you may fax the signed copy to [phone]. Thank you.

## 2020-02-11 NOTE — DISCHARGE SUMMARY
[address] [place] [address]    Name:  Ronald Montalvo  MR#:   [id number]  :  1950  ACCOUNT #:  [de-identified]  ADMIT DATE:  2020  DISCHARGE DATE:  2020      HOSPITAL COURSE:  The patient was admitted with end-stage osteoarthritis of the knee. He underwent a total knee arthroplasty using a De[employer] Password[employer] rotating platform cemented posterior stabilized system. His course was benign, and at the time of discharge, he was afebrile. His pain level was 3/10. His calf was soft. He flexed to 95 degrees. He was ambulating with a walker. His glucose level was 138. DISCHARGE INSTRUCTIONS:  He was to be discharged on oxycodone for pain relief and aspirin for venous thromboembolism prophylaxis. He was to be seen by a home physical therapist and a home health nurse and to follow up with me in two weeks.       Tasha Uribe MD      [id number]  D:  02/10/2020 15:35  T:  2020 3:27  JOB #:  [id number]

## 2020-02-13 ENCOUNTER — HOSPITAL ENCOUNTER (OUTPATIENT)
Dept: PHYSICAL THERAPY | Age: 70
Discharge: HOME | End: 2020-02-13
Payer: MEDICARE

## 2020-02-13 PROCEDURE — 97140 MANUAL THERAPY 1/> REGIONS: CPT

## 2020-02-13 PROCEDURE — 97110 THERAPEUTIC EXERCISES: CPT

## 2020-02-13 NOTE — PROGRESS NOTES
PHYSICAL THERAPY - DAILY TREATMENT NOTE    Patient Name: Javi Severino        Date: 2020  : 1950   yes Patient  Verified  Visit #:     Insurance: Payor: Gilbert Drafts / Plan: [place] MEDICARE PART A & B / Product Type: Medicare /      In time: 8758 Out time: 1110   Total Treatment Time: 44     Medicare/BCBS Time Tracking (below)   Total Timed Codes (min):  34 1:1 Treatment Time:  28     TREATMENT AREA =  Pain in left knee [M25.562]    SUBJECTIVE  Pain Level (on 0 to 10 scale):  3  / 10   Medication Changes/New allergies or changes in medical history, any new surgeries or procedures?    no  If yes, update Summary List   Subjective Functional Status/Changes:  []  No changes reported     Patient reports compliance with his home exercise program, denies complications since his IE.           OBJECTIVE  Modalities Rationale:     decrease inflammation and decrease pain to improve patient's ability to perform transfers. min [] Estim, type/location:                                      []  att     []  unatt     []  w/US     []  w/ice    []  w/heat    min []  Mechanical Traction: type/lbs                   []  pro   []  sup   []  int   []  cont    []  before manual    []  after manual    min []  Ultrasound, settings/location:      min []  Iontophoresis w/ dexamethasone, location:                                               []  take home patch       []  in clinic   10 min [x]  Ice     []  Heat    location/position: To L knee in supine with bolster    min []  Vasopneumatic Device, press/temp:     min []  Other:    [x] Skin assessment post-treatment (if applicable):    [x]  intact    []  redness- no adverse reaction     []redness - adverse reaction:        22 min Therapeutic Exercise:  [x]  See flow sheet   Rationale:      increase ROM, increase strength, improve coordination and improve balance to improve the patients ability to perform walking activities.       12 min Manual Therapy: STM to L quadriceps, scar massage, L patellar mobs, L knee flexion PROM   Rationale:      decrease pain, increase ROM, increase tissue extensibility and decrease trigger points to improve patient's ability to perform standing activities. Billed With/As:   [x] TE   [] TA   [] Neuro   [] Self Care Patient Education: [x] Review HEP    [] Progressed/Changed HEP based on:   [] positioning   [] body mechanics   [] transfers   [] heat/ice application    [] other:      Other Objective/Functional Measures:    1:! TE 12'  Progressed therapy program per flowsheet in order to improve L knee ROM, strength, and gait mechanics  Patient cued on mechanics with bharath negotiation and reducing hip circumduction compensation with the L LE     Post Treatment Pain Level (on 0 to 10) scale:   1  / 10     ASSESSMENT  Assessment/Changes in Function:     Patient educated on possible increase in soreness related to manual therapy and new exercise program. Instructed patient to ice as needed and to continue with HEP as prescribed. []  See Progress Note/Recertification   Patient will continue to benefit from skilled PT services to modify and progress therapeutic interventions, address functional mobility deficits, address ROM deficits, address strength deficits, analyze and address soft tissue restrictions, analyze and cue movement patterns, analyze and modify body mechanics/ergonomics and assess and modify postural abnormalities to attain remaining goals.    Progress toward goals / Updated goals:    Progressing with STG#1     PLAN  [x]  Upgrade activities as tolerated yes Continue plan of care   []  Discharge due to :    []  Other:      Therapist: Daniel Ramirez, PT    Date: 2/13/2020 Time: 2:20 PM     Future Appointments   Date Time Provider Leanne Carson   2/18/2020  8:30 AM Duc Whaley PT [facility]   2/20/2020 11:00 AM Duc Whaley PT [facility]   2/24/2020  3:00 PM Duc Whaley PT [facility]   2/28/2020 10:30 AM Sherry Powell, PT [facility]   3/2/2020  3:30 PM Sherry Powell, PT [facility]   3/5/2020  3:30 PM Sherry Powell, PT [facility]   3/9/2020  3:30 PM Sherry Powell, PT [facility]   3/12/2020  2:30 PM Sherry Powell, PT [facility]   4/23/2020 11:30 AM Ankush Dalton MD [address] [place] [address]

## 2020-02-18 ENCOUNTER — HOSPITAL ENCOUNTER (OUTPATIENT)
Dept: PHYSICAL THERAPY | Age: 70
Discharge: HOME | End: 2020-02-18
Payer: MEDICARE

## 2020-02-18 PROCEDURE — 97110 THERAPEUTIC EXERCISES: CPT

## 2020-02-18 PROCEDURE — 97140 MANUAL THERAPY 1/> REGIONS: CPT

## 2020-02-18 PROCEDURE — 97016 VASOPNEUMATIC DEVICE THERAPY: CPT

## 2020-02-18 NOTE — PROGRESS NOTES
PHYSICAL THERAPY - DAILY TREATMENT NOTE    Patient Name: Avelina Escobar        Date: 2020  : 1950   yes Patient  Verified  Visit #:   3   of   12  Insurance: Payor: Alejandro Dear / Plan: [place] MEDICARE PART A & B / Product Type: Medicare /      In time: 286 Out time: 958   Total Treatment Time: 54     Medicare/BCBS Time Tracking (below)   Total Timed Codes (min):  44 1:1 Treatment Time:  38     TREATMENT AREA =  Pain in left knee [M25.562]    SUBJECTIVE  Pain Level (on 0 to 10 scale):  3  / 10   Medication Changes/New allergies or changes in medical history, any new surgeries or procedures?    no  If yes, update Summary List   Subjective Functional Status/Changes:  []  No changes reported     Patient reports his knee was pretty sore after his last appointment. Patient states he is also worried his leg will give out on him when walking. OBJECTIVE  Modalities Rationale:     decrease edema, decrease inflammation and decrease pain to improve patient's ability to perform transfers.     min [] Estim, type/location:                                      []  att     []  unatt     []  w/US     []  w/ice    []  w/heat    min []  Mechanical Traction: type/lbs                   []  pro   []  sup   []  int   []  cont    []  before manual    []  after manual    min []  Ultrasound, settings/location:      min []  Iontophoresis w/ dexamethasone, location:                                               []  take home patch       []  in clinic    min []  Ice     []  Heat    location/position:    10 min [x]  Vasopneumatic Device, press/temp: 36 degree, low pressure to L knee in long sit    min []  Other:    [x] Skin assessment post-treatment (if applicable):    [x]  intact    []  redness- no adverse reaction     []redness - adverse reaction:        32 min Therapeutic Exercise:  [x]  See flow sheet   Rationale:      increase ROM, increase strength, improve coordination and improve balance to improve the patients ability to perform walking activities. 12 min Manual Therapy: STM to L quadriceps, L patellar mobs, L knee flexion PROM   Rationale:      decrease pain, increase ROM, increase tissue extensibility and decrease trigger points to improve patient's ability to perform standing activities. Billed With/As:   [x] TE   [] TA   [] Neuro   [] Self Care Patient Education: [x] Review HEP    [] Progressed/Changed HEP based on:   [] positioning   [] body mechanics   [] transfers   [] heat/ice application    [] other:      Other Objective/Functional Measures:    1:1 TE 32'  Patient cued on UE support during SLS and step up exercises in order to promote maximum LE muscle activation without inducing pain  Good tolerance to PROM after STM was performed. Post Treatment Pain Level (on 0 to 10) scale:   1  / 10     ASSESSMENT  Assessment/Changes in Function:     Patient educated that some increase in soreness is normal at the initiation of PT, however this should subside a treatment progresses. Patient is otherwise responding well to treatment at this time. []  See Progress Note/Recertification   Patient will continue to benefit from skilled PT services to modify and progress therapeutic interventions, address functional mobility deficits, address ROM deficits, address strength deficits, analyze and address soft tissue restrictions, analyze and cue movement patterns, analyze and modify body mechanics/ergonomics and assess and modify postural abnormalities to attain remaining goals.    Progress toward goals / Updated goals:    Progressing with STG#2 per manual therapy treatment     PLAN  [x]  Upgrade activities as tolerated yes Continue plan of care   []  Discharge due to :    []  Other:      Therapist: Daniel Ramirez PT    Date: 2/18/2020 Time: 9:56 AM     Future Appointments   Date Time Provider Leanne Carson   2/20/2020 11:00 AM Duc Whaley, PT [facility]   2/24/2020  3:00 PM Duc Whaley, PT [facility]   2/28/2020 10:30 AM Migue Schuster, PT [facility]   3/2/2020  3:30 PM Migue Schuster, PT [facility]   3/5/2020  3:30 PM Migue Schuster, PT [facility]   3/9/2020  3:30 PM Migue Schuster, PT [facility]   3/12/2020  2:30 PM Migue Schuster, PT [facility]   4/23/2020 11:30 AM Arelis Flores MD [address] [place] [address]

## 2020-02-20 ENCOUNTER — HOSPITAL ENCOUNTER (OUTPATIENT)
Dept: PHYSICAL THERAPY | Age: 70
Discharge: HOME | End: 2020-02-20
Payer: MEDICARE

## 2020-02-20 PROCEDURE — 97140 MANUAL THERAPY 1/> REGIONS: CPT

## 2020-02-20 PROCEDURE — 97110 THERAPEUTIC EXERCISES: CPT

## 2020-02-20 NOTE — PROGRESS NOTES
PHYSICAL THERAPY - DAILY TREATMENT NOTE    Patient Name: Melvin Law        Date: 2020  : 1950   yes Patient  Verified  Visit #:     Insurance: Payor: Raul Ling / Plan: [place] MEDICARE PART A & B / Product Type: Medicare /      In time: 9097 Out time: 445   Total Treatment Time: 60     Medicare/BCBS Time Tracking (below)   Total Timed Codes (min):  50 1:1 Treatment Time:  30     TREATMENT AREA =  Pain in left knee [M25.562]    SUBJECTIVE  Pain Level (on 0 to 10 scale):  3  / 10   Medication Changes/New allergies or changes in medical history, any new surgeries or procedures?    no  If yes, update Summary List   Subjective Functional Status/Changes:  []  No changes reported     Patient reports his knee wasn't as sore after his LV, but he still feels like it will Sebastian Frieze out on me. \"          OBJECTIVE  Modalities Rationale:     decrease edema, decrease inflammation and decrease pain to improve patient's ability to perform    min [] Estim, type/location:                                      []  att     []  unatt     []  w/US     []  w/ice    []  w/heat    min []  Mechanical Traction: type/lbs                   []  pro   []  sup   []  int   []  cont    []  before manual    []  after manual    min []  Ultrasound, settings/location:      min []  Iontophoresis w/ dexamethasone, location:                                               []  take home patch       []  in clinic    min []  Ice     []  Heat    location/position:    10 min [x]  Vasopneumatic Device, press/temp: 36 degrees, low pressure to L knee in long sit    min []  Other:    [x] Skin assessment post-treatment (if applicable):    [x]  intact    []  redness- no adverse reaction     []redness - adverse reaction:        38 min Therapeutic Exercise:  [x]  See flow sheet   Rationale:      increase ROM, increase strength, improve coordination and improve balance to improve the patients ability to perform walking activities.       12 min Manual Therapy: STM to L quadriceps, L patellar mobs, L knee flexion PROM   Rationale:      decrease pain, increase ROM, increase tissue extensibility and decrease trigger points to improve patient's ability to perform standing activities. Billed With/As:   [x] TE   [] TA   [] Neuro   [] Self Care Patient Education: [x] Review HEP    [] Progressed/Changed HEP based on:   [] positioning   [] body mechanics   [] transfers   [] heat/ice application    [] other:      Other Objective/Functional Measures:    1:1 TE 18'  Added SL LP, TKE with ball, and sit to  order to improve knee strength and stability this session  Performed small bharath negotiation outside of the parallel bars and without SPC     Post Treatment Pain Level (on 0 to 10) scale:   1  / 10     ASSESSMENT  Assessment/Changes in Function:     Patient tolerated progression of program without complication. Patient educated that as his strength improves, the feeling of instability will reduce. []  See Progress Note/Recertification   Patient will continue to benefit from skilled PT services to modify and progress therapeutic interventions, address functional mobility deficits, address ROM deficits, address strength deficits, analyze and address soft tissue restrictions, analyze and cue movement patterns, analyze and modify body mechanics/ergonomics and assess and modify postural abnormalities to attain remaining goals.    Progress toward goals / Updated goals:    Progressing with LTG#2     PLAN  [x]  Upgrade activities as tolerated yes Continue plan of care   []  Discharge due to :    []  Other:      Therapist: Maryse Rodriguez PT    Date: 2/20/2020 Time: 1:28 PM     Future Appointments   Date Time Provider Leanne Carson   2/24/2020  3:00 PM Ben Fregoso PT [facility]   2/28/2020 10:30 AM Ben Fregoso PT [facility]   3/2/2020  3:30 PM Ben Fregoso PT [facility]   3/5/2020  3:30 PM Ben Fregoso PT [facility] 3/9/2020  3:30 PM Sherry Powell, PT [facility]   3/12/2020  2:30 PM Sherry Powell, PT [facility]   4/23/2020 11:30 AM Ankush Dalton MD [address] [place] [address]

## 2020-02-24 ENCOUNTER — HOSPITAL ENCOUNTER (OUTPATIENT)
Dept: PHYSICAL THERAPY | Age: 70
Discharge: HOME | End: 2020-02-24
Payer: MEDICARE

## 2020-02-24 PROCEDURE — 97140 MANUAL THERAPY 1/> REGIONS: CPT

## 2020-02-24 NOTE — PROGRESS NOTES
PHYSICAL THERAPY - DAILY TREATMENT NOTE    Patient Name: Marbella Robles        Date: 2020  : 1950   yes Patient  Verified  Visit #:      12  Insurance: Payor: Hunter Borne / Plan: [place] MEDICARE PART A & B / Product Type: Medicare /      In time: 300 Out time: 353   Total Treatment Time: 53     Medicare/BCBS Time Tracking (below)   Total Timed Codes (min):  43 1:1 Treatment Time:  10     TREATMENT AREA =  Pain in left knee [M25.562]    SUBJECTIVE  Pain Level (on 0 to 10 scale):  0  / 10   Medication Changes/New allergies or changes in medical history, any new surgeries or procedures?    no  If yes, update Summary List   Subjective Functional Status/Changes:  []  No changes reported     Patient reports the top portion of his knee is still very itchy and it makes it difficult to sleep at night. Patient states he tried putting peroxide on it and that seemed to make it worse. Patient denies any other symptoms of possible infection. OBJECTIVE  Modalities Rationale:     decrease edema, decrease inflammation and decrease pain to improve patient's ability to perform transfers.     min [] Estim, type/location:                                      []  att     []  unatt     []  w/US     []  w/ice    []  w/heat    min []  Mechanical Traction: type/lbs                   []  pro   []  sup   []  int   []  cont    []  before manual    []  after manual    min []  Ultrasound, settings/location:      min []  Iontophoresis w/ dexamethasone, location:                                               []  take home patch       []  in clinic   10 min [x]  Ice     []  Heat    location/position: To L knee in long sit    min []  Vasopneumatic Device, press/temp:     min []  Other:    [x] Skin assessment post-treatment (if applicable):    [x]  intact    []  redness- no adverse reaction     []redness - adverse reaction:        33 min Therapeutic Exercise:  [x]  See flow sheet   Rationale:      increase ROM, increase strength, improve coordination and improve balance to improve the patients ability to perform walking activities. 10 min Manual Therapy: STM to L quadriceps, L patellar mobs, L knee flexion PROM   Rationale:      decrease pain, increase ROM, increase tissue extensibility and decrease trigger points to improve patient's ability to perform recreational activities. Billed With/As:   [x] TE   [] TA   [] Neuro   [] Self Care Patient Education: [x] Review HEP    [] Progressed/Changed HEP based on:   [] positioning   [] body mechanics   [] transfers   [] heat/ice application    [] other:      Other Objective/Functional Measures:    1:1 TE 0'  L knee flexion ROM after MT is 120  Raised, red circles noted at superior incision as well as medial and lateral knee in line with superior pole of scar, similar to possible adhesive allergy reaction  No signs of infection are noted and wound is full closed     Post Treatment Pain Level (on 0 to 10) scale:   0  / 10     ASSESSMENT  Assessment/Changes in Function:     Patient advised to use rubbing alcohol over red areas in order to ensure all adhesive is removed and then utilize a OTC hydrocortisone cream in order to reduce itchiness. Patient advised to contact surgeon if symptoms persist or worsen, patient acknowledged understanding. []  See Progress Note/Recertification   Patient will continue to benefit from skilled PT services to modify and progress therapeutic interventions, address functional mobility deficits, address ROM deficits, address strength deficits, analyze and address soft tissue restrictions, analyze and cue movement patterns, analyze and modify body mechanics/ergonomics and assess and modify postural abnormalities to attain remaining goals.    Progress toward goals / Updated goals:    Progressing well toward STG#2     PLAN  [x]  Upgrade activities as tolerated yes Continue plan of care   []  Discharge due to :    []  Other:      Therapist: Jaja Kaplan Jony Jhaveri, PT    Date: 2/24/2020 Time: 4:07 PM     Future Appointments   Date Time Provider Leanne Carson   2/28/2020 10:30 AM Liu Look, PT [facility]   3/2/2020  3:30 PM Liu Look, PT [facility]   3/5/2020  3:30 PM Liu Look, PT [facility]   3/9/2020  3:30 PM Liu Look, PT [facility]   3/12/2020  2:30 PM Liu Look, PT [facility]   4/23/2020 11:30 AM Tj Bhatt MD [address] [place] [address]

## 2020-02-28 ENCOUNTER — HOSPITAL ENCOUNTER (OUTPATIENT)
Dept: PHYSICAL THERAPY | Age: 70
Discharge: HOME | End: 2020-02-28
Payer: MEDICARE

## 2020-02-28 PROCEDURE — 97110 THERAPEUTIC EXERCISES: CPT

## 2020-02-28 PROCEDURE — 97140 MANUAL THERAPY 1/> REGIONS: CPT

## 2020-02-28 NOTE — PROGRESS NOTES
PHYSICAL THERAPY - DAILY TREATMENT NOTE    Patient Name: Dylan De Leon        Date: 2020  : 1950   yes Patient  Verified  Visit #:     Insurance: Payor: Linder Cheadle / Plan: [place] MEDICARE PART A & B / Product Type: Medicare /      In time: 9990 Out time: 1125   Total Treatment Time: 60     Medicare/BCBS Time Tracking (below)   Total Timed Codes (min):  50 1:1 Treatment Time:  35     TREATMENT AREA =  Pain in left knee [M25.562]    SUBJECTIVE  Pain Level (on 0 to 10 scale):  3  / 10   Medication Changes/New allergies or changes in medical history, any new surgeries or procedures?    no  If yes, update Summary List   Subjective Functional Status/Changes:  []  No changes reported     Patient reports the itchy rash has improved on his knee and the cortisone seemed to help. Patient still feels like he isn't confident in his knee and that it is unsteady. OBJECTIVE  Modalities Rationale:     decrease inflammation and decrease pain to improve patient's ability to perform transfers.     min [] Estim, type/location:                                      []  att     []  unatt     []  w/US     []  w/ice    []  w/heat    min []  Mechanical Traction: type/lbs                   []  pro   []  sup   []  int   []  cont    []  before manual    []  after manual    min []  Ultrasound, settings/location:      min []  Iontophoresis w/ dexamethasone, location:                                               []  take home patch       []  in clinic   10 min [x]  Ice     []  Heat    location/position: To L knee in long sit    min []  Vasopneumatic Device, press/temp:     min []  Other:    [x] Skin assessment post-treatment (if applicable):    [x]  intact    []  redness- no adverse reaction     []redness - adverse reaction:        38 min Therapeutic Exercise:  [x]  See flow sheet   Rationale:      increase ROM, increase strength, improve coordination and improve balance to improve the patients ability to perform walking activities. 12 min Manual Therapy: STM to L quadriceps, L patellar mobs, L knee flexion PROM   Rationale:      decrease pain, increase ROM, increase tissue extensibility and decrease trigger points to improve patient's ability to perform standing activities. Billed With/As:   [x] TE   [] TA   [] Neuro   [] Self Care Patient Education: [x] Review HEP    [] Progressed/Changed HEP based on:   [] positioning   [] body mechanics   [] transfers   [] heat/ice application    [] other:      Other Objective/Functional Measures:    1:1 TE 23'  L knee flexion after manual therapy 120 degrees  Progressed DL LP to 150# this session  Added step down from 4 inch block for improved eccentric strength and control of L quadriceps     Post Treatment Pain Level (on 0 to 10) scale:   1  / 10     ASSESSMENT  Assessment/Changes in Function:     Patient tolerated progression of program well. Plan with continued gradual progression of PT program in order to improve L knee strength and therefore confidence and stability during ADL's.      []  See Progress Note/Recertification   Patient will continue to benefit from skilled PT services to modify and progress therapeutic interventions, address functional mobility deficits, address ROM deficits, address strength deficits, analyze and address soft tissue restrictions, analyze and cue movement patterns, analyze and modify body mechanics/ergonomics and assess and modify postural abnormalities to attain remaining goals.    Progress toward goals / Updated goals:    Progressing with STG#2     PLAN  [x]  Upgrade activities as tolerated yes Continue plan of care   []  Discharge due to :    []  Other:      Therapist: Janeth Merchant PT    Date: 2/28/2020 Time: 11:28 AM     Future Appointments   Date Time Provider Leanne Carson   3/2/2020  3:30 PM Teri Minaya PT [facility]   3/5/2020  3:30 PM Teri Minaya PT [facility]   3/9/2020  3:30 PM Marcie Kaplan PT [facility]   3/12/2020  2:30 PM Christine Corrales, PT [facility]   4/23/2020 11:30 AM Lexy Burrell MD [address] [place] [address]

## 2020-03-02 ENCOUNTER — HOSPITAL ENCOUNTER (OUTPATIENT)
Dept: PHYSICAL THERAPY | Age: 70
Discharge: HOME | End: 2020-03-02
Payer: MEDICARE

## 2020-03-02 PROCEDURE — 97016 VASOPNEUMATIC DEVICE THERAPY: CPT

## 2020-03-02 PROCEDURE — 97110 THERAPEUTIC EXERCISES: CPT

## 2020-03-02 PROCEDURE — 97140 MANUAL THERAPY 1/> REGIONS: CPT

## 2020-03-02 NOTE — PROGRESS NOTES
PHYSICAL THERAPY - DAILY TREATMENT NOTE    Patient Name: Itz Elliott        Date: 3/2/2020  : 1950   yes Patient  Verified  Visit #:     Insurance: Payor: Latoya Tejeda / Plan: [place] MEDICARE PART A & B / Product Type: Medicare /      In time: 315 Out time: 411   Total Treatment Time: 56     Medicare/BCBS Time Tracking (below)   Total Timed Codes (min):  46 1:1 Treatment Time:  40     TREATMENT AREA =  Pain in left knee [M25.562]    SUBJECTIVE  Pain Level (on 0 to 10 scale):  3  / 10   Medication Changes/New allergies or changes in medical history, any new surgeries or procedures?    no  If yes, update Summary List   Subjective Functional Status/Changes:  []  No changes reported     Patient reports \"I am still not confident in my knee\" but states he is able to walk without his cane. Patient denies experiencing complications over the weekend. OBJECTIVE  Modalities Rationale:     decrease edema, decrease inflammation and decrease pain to improve patient's ability to perform transfers.     min [] Estim, type/location:                                      []  att     []  unatt     []  w/US     []  w/ice    []  w/heat    min []  Mechanical Traction: type/lbs                   []  pro   []  sup   []  int   []  cont    []  before manual    []  after manual    min []  Ultrasound, settings/location:      min []  Iontophoresis w/ dexamethasone, location:                                               []  take home patch       []  in clinic    min []  Ice     []  Heat    location/position:    10 min [x]  Vasopneumatic Device, press/temp: Low pressure, 34 degrees to L knee in long sit    min []  Other:    [x] Skin assessment post-treatment (if applicable):    [x]  intact    []  redness- no adverse reaction     []redness - adverse reaction:        34 min Therapeutic Exercise:  [x]  See flow sheet   Rationale:      increase ROM, increase strength, improve coordination and improve balance to improve the patients ability to perform walking activities. 12 min Manual Therapy: STM to L quadriceps, L patellar mobs, L knee flexion PROM   Rationale:      decrease pain, increase ROM, increase tissue extensibility and decrease trigger points to improve patient's ability to perform standing activities. Billed With/As:   [x] TE   [] TA   [] Neuro   [] Self Care Patient Education: [x] Review HEP    [] Progressed/Changed HEP based on:   [] positioning   [] body mechanics   [] transfers   [] heat/ice application    [] other:      Other Objective/Functional Measures:    1:1 TE 28'  L knee flexion AAROM 120 degrees after manual therapy  Improved eccentric control noted with step down from 4 inch block compared to last session     Post Treatment Pain Level (on 0 to 10) scale:   1  / 10     ASSESSMENT  Assessment/Changes in Function:     Patient is demonstrating gradual improvements in L knee strength and stability as evident by performance of TE program. Patient would benefit from continued PT in order to improve mechanics and tolerance to ambulation without AD. []  See Progress Note/Recertification   Patient will continue to benefit from skilled PT services to modify and progress therapeutic interventions, address functional mobility deficits, address ROM deficits, address strength deficits, analyze and address soft tissue restrictions, analyze and cue movement patterns, analyze and modify body mechanics/ergonomics, assess and modify postural abnormalities and address imbalance/dizziness to attain remaining goals.    Progress toward goals / Updated goals:    Progressing well toward STG#2 and LTG#2     PLAN  [x]  Upgrade activities as tolerated yes Continue plan of care   []  Discharge due to :    []  Other:      Therapist: Elva Harman PT    Date: 3/2/2020 Time: 4:01 PM     Future Appointments   Date Time Provider Leanne Carson   3/5/2020  3:30 PM [facility]   3/9/2020  3:30 PM Dank Magdaleno, PT [facility]   3/12/2020  2:30 PM Dank Magdaleno, PT [facility]   4/23/2020 11:30 AM Yenny Palmer MD [address] [place] [address]

## 2020-03-05 ENCOUNTER — HOSPITAL ENCOUNTER (OUTPATIENT)
Dept: PHYSICAL THERAPY | Age: 70
Discharge: HOME | End: 2020-03-05
Payer: MEDICARE

## 2020-03-05 PROCEDURE — 97110 THERAPEUTIC EXERCISES: CPT

## 2020-03-05 PROCEDURE — 97140 MANUAL THERAPY 1/> REGIONS: CPT

## 2020-03-09 ENCOUNTER — HOSPITAL ENCOUNTER (OUTPATIENT)
Dept: PHYSICAL THERAPY | Age: 70
Discharge: HOME | End: 2020-03-09
Payer: MEDICARE

## 2020-03-09 PROCEDURE — 97110 THERAPEUTIC EXERCISES: CPT

## 2020-03-09 PROCEDURE — 97140 MANUAL THERAPY 1/> REGIONS: CPT

## 2020-03-09 NOTE — PROGRESS NOTES
[address] [place] [address], [facility] - Phone: [phone]  Fax: [phone]  CONTINUED PLAN OF CARE/RECERTIFICATION FOR PHYSICAL THERAPY          Patient Name: Zeeshan Healy : 1950   Treatment/Medical Diagnosis: Pain in left knee [M25.562]   Onset Date: 20    Referral Source: Baljinder Rodriguez MD Start of Care [facility]): 2/10/20   Prior Hospitalization: See Medical History Provider #: [id number]   Prior Level of Function: Chronic history of difficulty with prolonged walking, standing and stair negotiation   Comorbidities: HTN, Type II Diabetes, R knee pain   Medications: Verified on Patient Summary List   Visits from [facility]: 9 Missed Visits: 0     Goal/Measure of Progress Goal Met? 1. Patient will improve FOTO to >/= 46/100 in order to improve quality of life   Status at last Eval:  Current Status: n/a n/a   2. Patient will demonstrate ability to ambulate with normal mechanics and community distances in order to improve tolerance to recreational activities   Status at last Eval: SPC, reduced stance time on L LE for short distances Current Status: Able to ambulate community distances with continued use of SPC progressing   3. Patient will report reduction in pain at worst to 0-1/10 in order to improve tolerance to household activities. Status at last Eval: 3-4/10 Current Status: 3-410 no     Key Functional Changes/Progress: Patient reports a 50% improvement in symptoms since the start of therapy. Patient states he is feeling more confident in his knee and this has correlated to improved walking mechanics and tolerance. Patient's L knee AROM is 6-117 with minor pain at end range. Patient's L LE strength is gradually improving and is able to perform a 6 inch step up with single hand support with proper technique.  Patient would continue to benefit from PT in order to further progress program and therefore improve strength and stability to address functional deficits. Problem List: pain affecting function, decrease ROM, decrease strength, impaired gait/ balance, decrease ADL/ functional abilitiies, decrease activity tolerance and decrease flexibility/ joint mobility   Treatment Plan may include any combination of the following: Therapeutic exercise, Therapeutic activities, Neuromuscular re-education, Physical agent/modality, Gait/balance training, Manual therapy, Patient education and Functional mobility training  Patient Goal(s) has been updated and includes:      Goals for this certification period include and are to be achieved in   4  weeks:  1. Continue with LTG#1  2. Continue with LTG#2  3. Continue with LTG#3  Frequency / Duration:   Patient to be seen   2   times per week for   4    weeks:  G-Codes (GP): na  Assessments/Recommendations: Patient would benefit from continued PT 2x/week for 4 weeks in order to further address impairments and functional limitations. If you have any questions/comments please contact us directly at [phone]. Thank you for allowing us to assist in the care of your patient. Therapist Signature: Daniel Ramirez PT Date: 7/4/4996   Certification Period:  Reporting Period: 2/10/20-5/8/20  2/10/20-3/9/20 Time: 4:46 PM   NOTE TO PHYSICIAN:  PLEASE COMPLETE THE ORDERS BELOW AND FAX TO   [facility]: [phone]  If you are unable to process this request in 24 hours please contact our office: [phone]    ___ I have read the above report and request that my patient continue as recommended.   ___ I have read the above report and request that my patient continue therapy with the following changes/special instructions: ________________________________________________   ___ I have read the above report and request that my patient be discharged from therapy.      Physician Signature:        Date:       Time:

## 2020-03-09 NOTE — PROGRESS NOTES
PHYSICAL THERAPY - DAILY TREATMENT NOTE    Patient Name: Sheba Call        Date: 3/9/2020  : 1950   yes Patient  Verified  Visit #:      of   12(+8)  Insurance: Payor: VA MEDICARE / Plan: VA MEDICARE PART A & B / Product Type: Medicare /      In time: 315 Out time: 400   Total Treatment Time: 45     Medicare/BCBS Time Tracking (below)   Total Timed Codes (min):  45 1:1 Treatment Time:  35     TREATMENT AREA =  Pain in left knee [M25.562]    SUBJECTIVE  Pain Level (on 0 to 10 scale):  2  / 10   Medication Changes/New allergies or changes in medical history, any new surgeries or procedures?    no  If yes, update Summary List   Subjective Functional Status/Changes:  []  No changes reported     Patient reports a 50% improvement in symptoms since the start of therapy. Patient states he is feeling more confident in his knee and is able to walk better. Patient rates his pain as a 3-4/10 at worst over the past 1-2 weeks. OBJECTIVE    33 min Therapeutic Exercise:  [x]  See flow sheet   Rationale:      increase ROM, increase strength, improve coordination and improve balance to improve the patients ability to perform walking activities. 12 min Manual Therapy: STM to L quadriceps, L patellar mobs, L HS stretch, L knee flexion PROM   Rationale:      decrease pain, increase ROM, increase tissue extensibility and decrease trigger points to improve patient's ability to perform standing activities.      Billed With/As:   [x] TE   [] TA   [] Neuro   [] Self Care Patient Education: [x] Review HEP    [] Progressed/Changed HEP based on:   [] positioning   [] body mechanics   [] transfers   [] heat/ice application    [] other:      Other Objective/Functional Measures:    1:1 TE 23'  See PN     Post Treatment Pain Level (on 0 to 10) scale:   1  / 10     ASSESSMENT  Assessment/Changes in Function:     See PN     []  See Progress Note/Recertification   Patient will continue to benefit from skilled PT services to modify and progress therapeutic interventions, address functional mobility deficits, address ROM deficits, address strength deficits, analyze and address soft tissue restrictions, analyze and cue movement patterns, analyze and modify body mechanics/ergonomics and assess and modify postural abnormalities to attain remaining goals.    Progress toward goals / Updated goals:    See PN     PLAN  [x]  Upgrade activities as tolerated yes Continue plan of care   []  Discharge due to :    []  Other:      Therapist: Jah Hardy PT    Date: 3/9/2020 Time: 4:47 PM     Future Appointments   Date Time Provider Leanne Carson   3/12/2020  2:30 PM Katz Jessie, PT [facility]   3/16/2020  3:30 PM Katz Jessie, PT [facility]   3/17/2020  5:30 PM Katz Jessie, PT [facility]   3/23/2020  3:00 PM Katz Jessie, PT [facility]   3/26/2020  5:30 PM Katz Jessie, PT [facility]   3/30/2020  3:00 PM Katz Jessie, PT [facility]   4/2/2020  5:30 PM Katz Jessie, PT [facility]   4/6/2020  3:30 PM Katz Jessie, PT [facility]   4/9/2020  3:00 PM Katz Jessie, PT [facility]   4/23/2020 11:30 AM Marcela Mace MD [address] [place] [address]

## 2020-03-12 ENCOUNTER — HOSPITAL ENCOUNTER (OUTPATIENT)
Dept: PHYSICAL THERAPY | Age: 70
Discharge: HOME | End: 2020-03-12
Payer: MEDICARE

## 2020-03-12 PROCEDURE — 97110 THERAPEUTIC EXERCISES: CPT

## 2020-03-12 PROCEDURE — 97016 VASOPNEUMATIC DEVICE THERAPY: CPT

## 2020-03-12 PROCEDURE — 97140 MANUAL THERAPY 1/> REGIONS: CPT

## 2020-03-12 NOTE — PROGRESS NOTES
PHYSICAL THERAPY - DAILY TREATMENT NOTE    Patient Name: Car Farah        Date: 3/12/2020  : 1950   yes Patient  Verified  Visit #:   10   of   20  Insurance: Payor: Segundo Simental / Plan: VA MEDICARE PART A & B / Product Type: Medicare /      In time: 221 Out time: 312   Total Treatment Time: 51     Medicare/BCBS Time Tracking (below)   Total Timed Codes (min):  41 1:1 Treatment Time:  35     TREATMENT AREA =  Pain in left knee [M25.562]    SUBJECTIVE  Pain Level (on 0 to 10 scale):  1-2  / 10   Medication Changes/New allergies or changes in medical history, any new surgeries or procedures?    no  If yes, update Summary List   Subjective Functional Status/Changes:  []  No changes reported     Patient reports the front of his knee is sore and swollen and that his R knee continues to bother him because he is compensating for the L.           OBJECTIVE  Modalities Rationale:     decrease edema, decrease inflammation and decrease pain to improve patient's ability to perform transfers.     min [] Estim, type/location:                                      []  att     []  unatt     []  w/US     []  w/ice    []  w/heat    min []  Mechanical Traction: type/lbs                   []  pro   []  sup   []  int   []  cont    []  before manual    []  after manual    min []  Ultrasound, settings/location:      min []  Iontophoresis w/ dexamethasone, location:                                               []  take home patch       []  in clinic    min []  Ice     []  Heat    location/position:    10 min [x]  Vasopneumatic Device, press/temp: Low pressure, 34 degrees to L knee in long sit    min []  Other:    [x] Skin assessment post-treatment (if applicable):    [x]  intact    []  redness- no adverse reaction     []redness - adverse reaction:        29 min Therapeutic Exercise:  [x]  See flow sheet   Rationale:      increase ROM, increase strength, improve coordination and improve balance to improve the patients ability to perform walking activities. 12 min Manual Therapy: STM to L medial HS, L quadriceps, L knee flexion PROM   Rationale:      decrease pain, increase ROM, increase tissue extensibility and decrease trigger points to improve patient's ability to perform household activities. Billed With/As:   [x] TE   [] TA   [] Neuro   [] Self Care Patient Education: [x] Review HEP    [] Progressed/Changed HEP based on:   [] positioning   [] body mechanics   [] transfers   [] heat/ice application    [] other:      Other Objective/Functional Measures:    1:1 TE 23'  L knee AROM after manual therapy 5-120  Progressed bharath negotiation to large hurdles  Performed BW squat this session through half ROM     Post Treatment Pain Level (on 0 to 10) scale:   1  / 10     ASSESSMENT  Assessment/Changes in Function:     Patient educated to ambulate at home and short community distances without SPC. Patient is demonstrating good improvements in L knee AROM and strength as evidence by performance of exercise program.      []  See Progress Note/Recertification   Patient will continue to benefit from skilled PT services to modify and progress therapeutic interventions, address functional mobility deficits, address ROM deficits, address strength deficits, analyze and address soft tissue restrictions, analyze and cue movement patterns, analyze and modify body mechanics/ergonomics, assess and modify postural abnormalities and address imbalance/dizziness to attain remaining goals.    Progress toward goals / Updated goals:    Progressing toward LTG#2     PLAN  [x]  Upgrade activities as tolerated yes Continue plan of care   []  Discharge due to :    []  Other:      Therapist: Melissa Escobar PT    Date: 3/12/2020 Time: 4:44 PM     Future Appointments   Date Time Provider Leanne Carson   3/16/2020  3:30 PM Judy Cobb, PT [facility]   3/17/2020  5:30 PM Judy Cobb PT [facility]   3/23/2020  3:00 PM Luis Kandace Carrillo, PT [facility]   3/26/2020  5:30 PM Sharlett Ildefonso, PT [facility]   3/30/2020  3:00 PM Sharlett Ildefonso, PT [facility]   4/2/2020  5:30 PM Sharlett Ildefonso, PT [facility]   4/6/2020  3:30 PM Sharlett Ildefonso, PT [facility]   4/9/2020  3:00 PM Sharlett Ildefonso, PT [facility]   4/23/2020 11:30 AM Michelle Bello MD [address] [place] [address]

## 2020-03-16 ENCOUNTER — HOSPITAL ENCOUNTER (OUTPATIENT)
Dept: PHYSICAL THERAPY | Age: 70
Discharge: HOME | End: 2020-03-16
Payer: MEDICARE

## 2020-03-16 PROCEDURE — 97016 VASOPNEUMATIC DEVICE THERAPY: CPT

## 2020-03-16 PROCEDURE — 97140 MANUAL THERAPY 1/> REGIONS: CPT

## 2020-03-16 PROCEDURE — 97110 THERAPEUTIC EXERCISES: CPT

## 2020-03-16 NOTE — PROGRESS NOTES
PHYSICAL THERAPY - DAILY TREATMENT NOTE    Patient Name: Sheba Call        Date: 3/16/2020  : 1950   yes Patient  Verified  Visit #:     Insurance: Payor: Giana Echavarria / Plan: VA MEDICARE PART A & B / Product Type: Medicare /      In time: 315 Out time: 405   Total Treatment Time: 50     Medicare/BCBS Time Tracking (below)   Total Timed Codes (min):  40 1:1 Treatment Time:  30     TREATMENT AREA =  Pain in left knee [M25.562]    SUBJECTIVE  Pain Level (on 0 to 10 scale):  1-2  / 10   Medication Changes/New allergies or changes in medical history, any new surgeries or procedures?    no  If yes, update Summary List   Subjective Functional Status/Changes:  []  No changes reported     Patient presents to clinic ambulating without SPC and states his knee is more sore across the front. Patient states he was walking a lot over the weekend while shopping. OBJECTIVE  Modalities Rationale:     decrease edema, decrease inflammation and decrease pain to improve patient's ability to perform transfers.     min [] Estim, type/location:                                      []  att     []  unatt     []  w/US     []  w/ice    []  w/heat    min []  Mechanical Traction: type/lbs                   []  pro   []  sup   []  int   []  cont    []  before manual    []  after manual    min []  Ultrasound, settings/location:      min []  Iontophoresis w/ dexamethasone, location:                                               []  take home patch       []  in clinic    min []  Ice     []  Heat    location/position:    10 min [x]  Vasopneumatic Device, press/temp: Low pressure, 34 degrees to L knee in long sit    min []  Other:    [x] Skin assessment post-treatment (if applicable):    [x]  intact    []  redness- no adverse reaction     []redness - adverse reaction:        25 min Therapeutic Exercise:  [x]  See flow sheet   Rationale:      increase ROM, increase strength, improve coordination and improve balance to improve the patients ability to perform walking activities. 15 min Manual Therapy: STM to L quadriceps, L medial HS, L knee flexion PROM, L HS stretch   Rationale:      decrease pain, increase ROM, increase tissue extensibility and decrease trigger points to improve patient's ability to perform standing activities. Billed With/As:   [x] TE   [] TA   [] Neuro   [] Self Care Patient Education: [x] Review HEP    [] Progressed/Changed HEP based on:   [] positioning   [] body mechanics   [] transfers   [] heat/ice application    [] other:      Other Objective/Functional Measures:    1:1 TE 15'  Cued during large bharath negotiation to avoid hip circumduction when trailing with L LE  Improved technique noted with lateral tap down exercise, reduced UE support required     Post Treatment Pain Level (on 0 to 10) scale:   0  / 10     ASSESSMENT  Assessment/Changes in Function:     Patient noted reduced pain post session. Patient demonstrating improved tolerance to walking activities with less AD required. []  See Progress Note/Recertification   Patient will continue to benefit from skilled PT services to modify and progress therapeutic interventions, address functional mobility deficits, address ROM deficits, address strength deficits, analyze and address soft tissue restrictions, analyze and cue movement patterns, analyze and modify body mechanics/ergonomics and assess and modify postural abnormalities to attain remaining goals.    Progress toward goals / Updated goals:    Progressing with LTG#2     PLAN  [x]  Upgrade activities as tolerated yes Continue plan of care   []  Discharge due to :    []  Other:      Therapist: Ramesh Chauhan PT    Date: 3/16/2020 Time: 4:31 PM     Future Appointments   Date Time Provider Leanne Carson   3/17/2020  5:30 PM Danielito Mccann PT [facility]   3/23/2020  3:00 PM Danielito Mccann PT [facility]   3/26/2020  5:30 PM Danielito Mccann PT [facility] 3/30/2020  3:00 PM Marquis Quintero, PT [facility]   4/2/2020  5:30 PM Marquis Quintero, PT [facility]   4/6/2020  3:30 PM Marquis Quintero, PT [facility]   4/9/2020  3:00 PM Marquis Quintero, PT [facility]   4/23/2020 11:30 AM Maciej Mueller MD [address] [place] [address]

## 2020-03-17 ENCOUNTER — HOSPITAL ENCOUNTER (OUTPATIENT)
Dept: PHYSICAL THERAPY | Age: 70
Discharge: HOME | End: 2020-03-17
Payer: MEDICARE

## 2020-03-17 PROCEDURE — 97110 THERAPEUTIC EXERCISES: CPT

## 2020-03-17 PROCEDURE — 97140 MANUAL THERAPY 1/> REGIONS: CPT

## 2020-03-17 PROCEDURE — 97016 VASOPNEUMATIC DEVICE THERAPY: CPT

## 2020-03-17 NOTE — PROGRESS NOTES
PHYSICAL THERAPY - DAILY TREATMENT NOTE    Patient Name: Monica Sarmiento        Date: 3/17/2020  : 1950   yes Patient  Verified  Visit #:     Insurance: Payor: Citlali Srinivasan / Plan: [place] MEDICARE PART A & B / Product Type: Medicare /      In time: 105 Out time: 971   Total Treatment Time: 46     Medicare/BCBS Time Tracking (below)   Total Timed Codes (min):  36 1:1 Treatment Time:  30     TREATMENT AREA =  Pain in left knee [M25.562]    SUBJECTIVE  Pain Level (on 0 to 10 scale):  1-2  / 10   Medication Changes/New allergies or changes in medical history, any new surgeries or procedures?    no  If yes, update Summary List   Subjective Functional Status/Changes:  []  No changes reported     Patient reports his knee is doing well and denies complications from yesterday's appointment. OBJECTIVE  Modalities Rationale:     decrease edema, decrease inflammation and decrease pain to improve patient's ability to perform transfers. min [] Estim, type/location:                                      []  att     []  unatt     []  w/US     []  w/ice    []  w/heat    min []  Mechanical Traction: type/lbs                   []  pro   []  sup   []  int   []  cont    []  before manual    []  after manual    min []  Ultrasound, settings/location:      min []  Iontophoresis w/ dexamethasone, location:                                               []  take home patch       []  in clinic    min []  Ice     []  Heat    location/position:    10 min [x]  Vasopneumatic Device, press/temp: Low pressure, 34 degrees to L knee in supine    min []  Other:    [x] Skin assessment post-treatment (if applicable):    [x]  intact    []  redness- no adverse reaction     []redness - adverse reaction:        24 min Therapeutic Exercise:  [x]  See flow sheet   Rationale:      increase ROM, increase strength, improve coordination and improve balance to improve the patients ability to perform walking activities.       12 min Manual Therapy: STM to L quadriceps, L patellar mobs, L knee flexion PROM   Rationale:      decrease pain, increase ROM, increase tissue extensibility and decrease trigger points to improve patient's ability to perform standing activities. Billed With/As:   [x] TE   [] TA   [] Neuro   [] Self Care Patient Education: [x] Review HEP    [] Progressed/Changed HEP based on:   [] positioning   [] body mechanics   [] transfers   [] heat/ice application    [] other:      Other Objective/Functional Measures:    1:1 TE 25'  Held on lateral tap downs and BW squat this session secondary to patient attending PT yesterday and wanting to avoid excessive stress to anterior knee in a short time period     Post Treatment Pain Level (on 0 to 10) scale:   0  / 10     ASSESSMENT  Assessment/Changes in Function:     Patient tolerated modified program well. Patient would benefit from full PT program at [place] [address] in order to continue with progress toward improvements in ADL function. []  See Progress Note/Recertification   Patient will continue to benefit from skilled PT services to modify and progress therapeutic interventions, address functional mobility deficits, address ROM deficits, address strength deficits, analyze and address soft tissue restrictions, analyze and cue movement patterns, analyze and modify body mechanics/ergonomics and assess and modify postural abnormalities to attain remaining goals.    Progress toward goals / Updated goals:    Progressing toward LTG#2 per presentation in clinic     PLAN  [x]  Upgrade activities as tolerated yes Continue plan of care   []  Discharge due to :    []  Other:      Therapist: Brandt Grider PT    Date: 3/17/2020 Time: 6:09 PM     Future Appointments   Date Time Provider Leanne Carson   3/23/2020  3:00 PM Christine Corrales PT [facility]   3/26/2020  5:30 PM Christine Corrales PT [facility]   3/30/2020  3:00 PM Christine Corrales PT [facility]   4/2/2020  5:30 PM Andres Saravia, PT [facility]   4/6/2020  3:30 PM Andres Saravia, PT [facility]   4/9/2020  3:00 PM Andres Saravia, PT [facility]   4/23/2020 11:30 AM Lisa Cortez MD [address] [place] [address]

## 2020-03-23 ENCOUNTER — HOSPITAL ENCOUNTER (OUTPATIENT)
Dept: PHYSICAL THERAPY | Age: 70
Discharge: HOME | End: 2020-03-23
Payer: MEDICARE

## 2020-03-23 PROCEDURE — 97110 THERAPEUTIC EXERCISES: CPT

## 2020-03-23 PROCEDURE — 97140 MANUAL THERAPY 1/> REGIONS: CPT

## 2020-03-23 NOTE — PROGRESS NOTES
PHYSICAL THERAPY - DAILY TREATMENT NOTE    Patient Name: Be Fierro        Date: 3/23/2020  : 1950   yes Patient  Verified  Visit #:   15   of   20  Insurance: Payor: Carlos Honey / Plan: [place] MEDICARE PART A & B / Product Type: Medicare /      In time: 249 Out time: 333   Total Treatment Time: 44     Medicare/BCBS Time Tracking (below)   Total Timed Codes (min):  34 1:1 Treatment Time:  28     TREATMENT AREA =  Pain in left knee [M25.562]    SUBJECTIVE  Pain Level (on 0 to 10 scale):  1  / 10   Medication Changes/New allergies or changes in medical history, any new surgeries or procedures?    no  If yes, update Summary List   Subjective Functional Status/Changes:  []  No changes reported     Patient reports his knee is doing well and his symptoms seem more \"concentrated on the front. \" Patient denies complications over the weekend. OBJECTIVE  Modalities Rationale:     decrease inflammation and decrease pain to improve patient's ability to perform transfers. min [] Estim, type/location:                                      []  att     []  unatt     []  w/US     []  w/ice    []  w/heat    min []  Mechanical Traction: type/lbs                   []  pro   []  sup   []  int   []  cont    []  before manual    []  after manual    min []  Ultrasound, settings/location:      min []  Iontophoresis w/ dexamethasone, location:                                               []  take home patch       []  in clinic   10 min [x]  Ice     []  Heat    location/position: To L knee in long sit    min []  Vasopneumatic Device, press/temp:     min []  Other:    [x] Skin assessment post-treatment (if applicable):    [x]  intact    []  redness- no adverse reaction     []redness  adverse reaction:        24 min Therapeutic Exercise:  [x]  See flow sheet   Rationale:      increase ROM, increase strength, improve coordination and improve balance to improve the patients ability to perform walking activities. 10 min Manual Therapy: STM to L quadriceps, L patellar mobs, L knee flexion PROM, L HS stretch   Rationale:      decrease pain, increase ROM, increase tissue extensibility and decrease trigger points to improve patient's ability to perform     Billed With/As:   [x] TE   [] TA   [] Neuro   [] Self Care Patient Education: [x] Review HEP    [] Progressed/Changed HEP based on:   [] positioning   [] body mechanics   [] transfers   [] heat/ice application    [] other:      Other Objective/Functional Measures:    1:1 TE 25'  Demonstrates reduced eccentric strength during lateral tap down exercise, requiring UE support  To compensatory weight shift noted with sit to stand exercise     Post Treatment Pain Level (on 0 to 10) scale:   0  / 10     ASSESSMENT  Assessment/Changes in Function:     Patient will be placed on hold at this time due to COVID-19 concerns. Patient would benefit from additional therapy in order to further improve strength and stability, however will continue with program independently at this time. []  See Progress Note/Recertification   Patient will continue to benefit from skilled PT services to modify and progress therapeutic interventions, address functional mobility deficits, address ROM deficits, address strength deficits, analyze and address soft tissue restrictions, analyze and cue movement patterns, analyze and modify body mechanics/ergonomics and assess and modify postural abnormalities to attain remaining goals.    Progress toward goals / Updated goals:    Patient to be placed on hold from PT at this time     PLAN  [x]  Upgrade activities as tolerated yes Continue plan of care   []  Discharge due to :    []  Other:      Therapist: Patience Evans PT    Date: 3/23/2020 Time: 3:28 PM     Future Appointments   Date Time Provider Leanne Carson   4/23/2020 11:30 AM Barbara Nick MD [address] [place] [address]

## 2020-03-26 ENCOUNTER — APPOINTMENT (OUTPATIENT)
Dept: PHYSICAL THERAPY | Age: 70
End: 2020-03-26
Payer: MEDICARE

## 2020-03-30 ENCOUNTER — APPOINTMENT (OUTPATIENT)
Dept: PHYSICAL THERAPY | Age: 70
End: 2020-03-30
Payer: MEDICARE

## 2020-03-30 NOTE — PROGRESS NOTES
[address] PHYSICAL THERAPY   [facility], [facility],[place], [facility] - Phone: [phone]  Fax: [phone]  CONTINUED PLAN OF CARE/RECERTIFICATION FOR PHYSICAL THERAPY          Patient Name: Car Farah : 1950   Treatment/Medical Diagnosis: Pain in left knee [M25.562]   Onset Date: 20    Referral Source: Regina Mahoney MD Start of [facility]): 2/10/20   Prior Hospitalization: See Medical History Provider #: [id number]   Prior Level of Function: Chronic history of difficulty with prolonged walking, standing and stair negotiation   Comorbidities: HTN, Type II Diabetes, R knee pain   Medications: Verified on Patient Summary List   Visits from [facility]: 13 Missed Visits: 0     Goal/Measure of Progress Goal Met? 1. Patient will improve FOTO to >/= 46/100 in order to improve quality of life   Status at last Eval:  Current Status: n/a n/a   2. Patient will demonstrate ability to ambulate with normal mechanics and community distances in order to improve tolerance to recreational activities   Status at last Eval: Able to ambulate community distances with continued use of SPC Current Status: Able to ambulate community distances without AD yes   3. Patient will report reduction in pain at worst to 0-1/10 in order to improve tolerance to household activities. Status at last Eval: 3-4/10 Current Status: 1-2/10 Progressing     Key Functional Changes/Progress: Patient has been progressing well with his PT program, noting improved tolerance to walking activities without use of his AD. Patient's ROM and strength had gradually improved as demonstrated by performance in PT clinic. Due to nationwide COVID-19 concerns, patient will be placed on hold from PT at this time. Patient will be monitored on a weekly basis via phone calls and provided updated HEP as needed.    Problem List: pain affecting function, decrease ROM, decrease strength, impaired gait/ balance, decrease ADL/ functional abilitiies, decrease activity tolerance and decrease flexibility/ joint mobility   Treatment Plan may include any combination of the following: Therapeutic exercise, Therapeutic activities, Neuromuscular re-education, Physical agent/modality, Gait/balance training, Manual therapy, Patient education and Functional mobility training  Patient Goal(s) has been updated and includes:      Goals for this certification period include and are to be achieved in   4  weeks:  1. Continue with LTG#1  2. Continue with LTG#3  Frequency / Duration:   Patient to be seen   2   times per week for   4    weeks:  G-Codes (GP): andrew  Assessments/Recommendations: Pt temporarily placed on hold due to the nationwide concerns over COVID-19. Pt issued HEP and therapy staff will continue to contact pt every 1-2 weeks via phone to monitor progress. Plan to resume physical therapy once concerns improve. Thank you. If you have any questions/comments please contact us directly at [phone]. Thank you for allowing us to assist in the care of your patient. Therapist Signature: Melissa Escobar PT Date: 5/67/1565   Certification Period:  Reporting Period: 2/10/20-5/8/20  3/9/20-3/30/20 Time: 12:34 PM   NOTE TO PHYSICIAN:  PLEASE COMPLETE THE ORDERS BELOW AND FAX TO   [facility]: [phone]  If you are unable to process this request in 24 hours please contact our office: [phone]    ___ I have read the above report and request that my patient continue as recommended.   ___ I have read the above report and request that my patient continue therapy with the following changes/special instructions: ________________________________________________   ___ I have read the above report and request that my patient be discharged from therapy.      Physician Signature:        Date:       Time:

## 2020-03-30 NOTE — PROGRESS NOTES
PHYSICAL THERAPY - COURTESY CHECK-IN PHONE CALL    Patient Name: Wilbert Durand        Date: 3/30/2020  : 1950   yes Patient  Verified  Insurance: Payor: VA MEDICARE / Plan: VA MEDICARE PART A & B / Product Type: Medicare /      Start time:  End time:      Detail of Conversation:    Patient stated he is doing well, but would like to resume PT when able. Patient states his R knee is bothering him more at this time, but he has a follow up appointment soon with his MD. Patient's wife also requested patient remain on call list and provide a weekly check in.      Follow-up Actions:    [x] Check-in via phone in 1-2 weeks  [] Provide updated HEP  [] Discharge no further need for check-ins (write DC note and send to physician)    Other: n/a      Therapist: Elsie Simms PT    Date: 3/30/2020 Time: 10:15 AM

## 2020-04-02 ENCOUNTER — APPOINTMENT (OUTPATIENT)
Dept: PHYSICAL THERAPY | Age: 70
End: 2020-04-02

## 2020-04-06 ENCOUNTER — APPOINTMENT (OUTPATIENT)
Dept: PHYSICAL THERAPY | Age: 70
End: 2020-04-06

## 2020-04-09 ENCOUNTER — APPOINTMENT (OUTPATIENT)
Dept: PHYSICAL THERAPY | Age: 70
End: 2020-04-09

## 2020-04-14 NOTE — PROGRESS NOTES
PHYSICAL THERAPY - COURTESY CHECK-IN PHONE CALL    Patient Name: Dylan De Leon        Date: 2020  : 1950   yes Patient  Verified  Insurance: Payor: VA MEDICARE / Plan: VA MEDICARE PART A & B / Product Type: Medicare /      Start time: 092 End time: 455     Detail of Conversation:    Discussed current scheduling changes, patient feeling good and is okay with being discharged at this time.      Follow-up Actions:    [] Check-in via phone in 1-2 weeks  [] Provide updated HEP  [x] Discharge no further need for check-ins (write DC note and send to physician)    Other: n/a      Therapist: Tito Vieyra PT    Date: 2020 Time: 3:24 PM

## 2020-04-28 ENCOUNTER — VIRTUAL VISIT (OUTPATIENT)
Dept: CARDIOLOGY CLINIC | Age: 70
End: 2020-04-28

## 2020-04-28 VITALS
BODY MASS INDEX: 31.1 KG/M2 | WEIGHT: 210 LBS | SYSTOLIC BLOOD PRESSURE: 122 MMHG | DIASTOLIC BLOOD PRESSURE: 79 MMHG | HEIGHT: 69 IN

## 2020-04-28 DIAGNOSIS — I10 ESSENTIAL HYPERTENSION: ICD-10-CM

## 2020-04-28 DIAGNOSIS — E78.00 PURE HYPERCHOLESTEROLEMIA: ICD-10-CM

## 2020-04-28 DIAGNOSIS — E78.00 HYPERCHOLESTEREMIA: ICD-10-CM

## 2020-04-28 DIAGNOSIS — I10 ESSENTIAL HYPERTENSION WITH GOAL BLOOD PRESSURE LESS THAN 140/90: Primary | ICD-10-CM

## 2020-04-28 RX ORDER — METOPROLOL TARTRATE 25 MG/1
25 TABLET, FILM COATED ORAL 2 TIMES DAILY
Qty: 180 TAB | Refills: 3 | Status: SHIPPED | OUTPATIENT
Start: 2020-04-28 | End: 2021-11-09

## 2020-04-28 RX ORDER — ATORVASTATIN CALCIUM 10 MG/1
10 TABLET, FILM COATED ORAL DAILY
Qty: 90 TAB | Refills: 3 | Status: SHIPPED | OUTPATIENT
Start: 2020-04-28

## 2020-04-28 RX ORDER — AMLODIPINE BESYLATE 5 MG/1
5 TABLET ORAL DAILY
Qty: 90 TAB | Refills: 3 | Status: SHIPPED | OUTPATIENT
Start: 2020-04-28 | End: 2021-05-04 | Stop reason: ALTCHOICE

## 2020-04-28 RX ORDER — LOSARTAN POTASSIUM 50 MG/1
50 TABLET ORAL DAILY
Qty: 90 TAB | Refills: 3 | Status: SHIPPED | OUTPATIENT
Start: 2020-04-28 | End: 2021-11-16

## 2020-04-28 NOTE — PROGRESS NOTES
Samanta Lane presents today for   Chief Complaint   Patient presents with    Hypertension     follow up       Samanta Lane preferred language for health care discussion is english/other. Is someone accompanying this pt? Y, spouse    Is the patient using any DME equipment during [address]? Yes, rollator    Depression Screening:  3 most recent PHQ Screens 4/28/2020   Little interest or pleasure in doing things Not at all   Feeling down, depressed, irritable, or hopeless Not at all   Total Score PHQ 2 0       Learning Assessment:  Learning Assessment 4/29/2015   PRIMARY LEARNER Patient   HIGHEST LEVEL OF EDUCATION - PRIMARY LEARNER  SOME COLLEGE   BARRIERS PRIMARY LEARNER NONE   CO-LEARNER CAREGIVER No   PRIMARY LANGUAGE ENGLISH    NEED No   LEARNER PREFERENCE PRIMARY READING   LEARNING SPECIAL TOPICS no   ANSWERED BY self   RELATIONSHIP SELF       Abuse Screening:  Abuse Screening Questionnaire 4/28/2020   Do you ever feel afraid of your partner? N   Are you in a relationship with someone who physically or mentally threatens you? N   Is it safe for you to go home? Y       Fall Risk  Fall Risk Assessment, last 12 mths 4/28/2020   Able to walk? Yes   Fall in past 12 months? No       Pt currently taking Anticoagulant therapy? no    Coordination of Care:  1. Have you been to the ER, urgent care clinic since your last visit? Hospitalized since your last visit? no    2. Have you seen or consulted any other health care providers outside of the [address] [place] [address] since your last visit? Include any pap smears or colon screening.  no

## 2020-04-28 NOTE — PROGRESS NOTES
[facility]    Mr. Saundra Whaley is a 77 y.o. male with a history of diabetes, hypertension, hyperlipidemia and DJD, sleep apnea    Patient was seen today as a virtual care visit. He denies any hospital admission or ER visit. He denies any fever, chills or any shortness of breath. He denies any chest pain or chest tightness. He denies any palpitation, presyncope or syncope. He has undergone left knee replacement surgery without any complication. He denies PND or any significant lower extremity swelling. Past Medical History:   Diagnosis Date    Arthritis     Degenerative disc disease, lumbar     Diabetes ([facility]     HLD (hyperlipidemia)     Hypertension     Intracranial hemorrhage, cerebellar ([facility] 2018    no residual    PPD positive, treated 2018    was treated    Sleep apnea     on cpap    Spondylosis of lumbar spine          Past Surgical History:   Procedure Laterality Date    COLONOSCOPY  10/13/2015 Return 10/14/2025    Dr. Bennett ; screening    HX COLONOSCOPY      HX KNEE ARTHROSCOPY Right 1986    right knee    HX WRIST FRACTURE TX Left     ORIF       Current Outpatient Medications   Medication Sig    acetaminophen (TYLENOL) 500 mg tablet Take 500 mg by mouth every six (6) hours as needed for Pain.  HYDROcodone-acetaminophen (NORCO) 5-325 mg per tablet Take  by mouth two (2) times daily as needed.  amLODIPine (NORVASC) 5 mg tablet Take 1 Tab by mouth daily.  atorvastatin (LIPITOR) 10 mg tablet Take 1 Tab by mouth daily.  losartan (COZAAR) 50 mg tablet Take 1 Tab by mouth daily.  cetirizine (ZYRTEC) 10 mg tablet Take 1 Tab by mouth daily.  metoprolol tartrate (LOPRESSOR) 25 mg tablet Take 1 Tab by mouth two (2) times a day.  metFORMIN ER (GLUCOPHAGE XR) 500 mg tablet Take 2 tabs po daily with a meal (Patient taking differently: daily (with dinner).  Take 2 tabs po daily with a meal)    SITagliptin (JANUVIA) 50 mg tablet Take 1 Tab by mouth daily.  sildenafil citrate (VIAGRA) 100 mg tablet Take 1 Tab by mouth as needed (E.D.). Once daily    ketoconazole (NIZORAL) 2 % shampoo Shampoo daily, lather and let sit for 5 minutes, then rinse  Indications: Dandruff    tamsulosin (FLOMAX) 0.4 mg capsule Take 1 Cap by mouth daily.  albuterol (PROVENTIL HFA, VENTOLIN HFA, PROAIR HFA) 90 mcg/actuation inhaler Take 2 Puffs by inhalation every six (6) hours as needed for Wheezing.  melatonin 5 mg tablet Take  by mouth.  docusate sodium (COLACE) 100 mg capsule Take 100 mg by mouth two (2) times a day.  aspirin delayed-release 325 mg tablet Take 325 mg by mouth two (2) times a day.  celecoxib (CELEBREX) 200 mg capsule Take 200 mg by mouth two (2) times a day.  polyethylene glycol (MIRALAX) 17 gram/dose powder Take 17 g by mouth daily.  oxyCODONE IR (ROXICODONE) 5 mg immediate release tablet Take 5 mg by mouth every six (6) hours as needed for Pain. No current facility-administered medications for this visit. Allergies and Sensitivities:  No Known Allergies    Family History:  Family History   Problem Relation Age of Onset    No Known Problems Mother     No Known Problems Father        Social History:  Social History     Tobacco Use    Smoking status: Never Smoker    Smokeless tobacco: Never Used   Substance Use Topics    Alcohol use: Yes     Alcohol/week: 1.7 standard drinks     Types: 2 Cans of beer per week    Drug use: No     He  reports that he has never smoked. He has never used smokeless tobacco.  He  reports current alcohol use of about 1.7 standard drinks of alcohol per week. Review of Systems:  Cardiac symptoms as noted above in HPI. All others negative. Denies fatigue, malaise, skin rash, joint pain, blurring vision, photophobia, neck pain, hemoptysis, chronic cough, nausea, vomiting, hematuria, burning micturition, BRBPR, chronic headaches.     Physical Exam:  BP Readings from Last 3 Encounters:   04/28/20 122/79   02/06/20 122/70   02/06/20 122/70         Pulse Readings from Last 3 Encounters:   02/06/20 68   02/06/20 68   02/05/20 73          Wt Readings from Last 3 Encounters:   04/28/20 210 lb (95.3 kg)   01/23/20 213 lb (96.6 kg)   01/17/20 213 lb (96.6 kg)       Objective:   Vital Signs: (As obtained by patient/caregiver at home)  Visit Vitals  /79 (BP 1 Location: Left arm, BP Patient Position: Sitting)   Ht 5' 9\" (1.753 m)   Wt 210 lb (95.3 kg)   BMI 31.01 kg/m²        Constitutional: [x] Appears well-developed and well-nourished [x] No apparent distress    Mental status: [x] Alert and awake  [x] Oriented to person/place/time [x] Able to follow commands    Eyes:   EOM    [x]  Normal      Sclera  [x]  Normal             Discharge [x]  None visible     HENT: [x] Normocephalic, atraumatic   [x] Mouth/Throat: Mucous membranes are moist  External Ears [x] Normal    Neck: [x] No visualized mass  Pulmonary/Chest: [x] Respiratory effort normal   [x] No visualized signs of difficulty breathing or respiratory distress  Musculoskeletal:   [x] Normal gait with no signs of ataxia         [x] Normal range of motion of neck  Neurological:        [x] No Facial Asymmetry (Cranial nerve 7 motor function) (limited exam due to video visit)          [x] No gaze palsy   Skin:        [x] No significant exanthematous lesions or discoloration noted on facial skin    Psychiatric:       [x] Normal Affect        [x] No Hallucinations    Review of Data  LABS:   Lab Results   Component Value Date/Time    Sodium 140 01/10/2020 10:17 AM    Potassium 4.2 01/10/2020 10:17 AM    Chloride 109 01/10/2020 10:17 AM    CO2 27 01/10/2020 10:17 AM    Glucose 145 (H) 01/10/2020 10:17 AM    BUN 21 (H) 01/10/2020 10:17 AM    Creatinine 0.74 01/10/2020 10:17 AM     Lipids Latest Ref Rng & Units 6/25/2019 7/13/2018 1/18/2018 7/12/2017 2/8/2017   Chol, Total <200 MG/ 168 167 200(H) 177   HDL 40 - 60 MG/DL 48 60 44 58 53   LDL 0 - 100 MG/DL 44.4 92.8 93 120. 4(H) 92.6   Trig <150 MG/ 76 151(H) 108 157(H)   Chol/HDL Ratio 0 - 5.0   2.4 2.8 - 3.4 3.3   Some recent data might be hidden     Lab Results   Component Value Date/Time    ALT (SGPT) 34 01/10/2020 10:17 AM     Lab Results   Component Value Date/Time    Hemoglobin A1c 6.3 (H) 11/14/2019 02:30 PM    Hemoglobin A1c (POC) 5.8 08/11/2015 08:45 AM       EKG  Sinus rhythm. Incomplete right bundle branch block. No ST changes of ischemia    ECHO (01/20)  Left Ventricle Normal cavity size, wall thickness and systolic function (ejection fraction normal). The estimated ejection fraction is 55 - 60%. Visually measured ejection fraction. No regional wall motion abnormality noted. There is mild (grade 1) left ventricular diastolic dysfunction. Wall Scoring The left ventricular wall motion is normal.            Left Atrium Mildly dilated left atrium. Left Atrium volume index is 33 mL/m2. The volume is normal.   Right Ventricle Normal cavity size and global systolic function. Right Atrium Normal cavity size. Aortic Valve Normal valve structure, trileaflet valve structure and no stenosis. Trace aortic valve regurgitation. Mitral Valve No stenosis. Mitral valve non-specific thickening. Trace regurgitation. Tricuspid Valve No stenosis. Non-specific thickening. Trace regurgitation. Pulmonary arterial systolic pressure is 51.2 mmHg. There is no evidence of pulmonary hypertension. Pulmonic Valve Pulmonic valve not well visualized. No stenosis. Trace regurgitation. Aorta Normal aortic root and ascending aorta. STRESS TEST (01/20)  · Baseline ECG: Sinus rhythm. · Gated SPECT: Left ventricular function post-stress was normal. Calculated ejection fraction is 68%. · Myocardial perfusion imaging supports a low risk stress test.  · Technically difficult study. Inferior perfusion defect is most consistent with diaphragmatic attenuation artifact.  There was no convincing evidence of significant reversible defect to suggest ongoing major ischemia. IMPRESSION & PLAN:  Mr. Rajiv Baez is 79-year-old male with multiple medical problem    Hypertension:  Blood pressure was checked this morning at home and it was 122/79 mmHg. Continue current antihypertensives    Hyperlipidemia:  Currently on atorvastatin. Last LDL 44. Continue same PCP is checking lipid profile regularly    Diabetes:  Goal hemoglobin A1c less than 7 is recommended. Defer to PCP. Last A1c less 6.3 per patient    Patient denies any symptoms to suggest angina or heart failure at this time. We discussed the expected course, resolution and complications of the diagnosis(es) in detail. Medication risks, benefits, costs, interactions, and alternatives were discussed as indicated. I advised him to contact the office if his condition worsens, changes or fails to improve as anticipated. He expressed understanding with the diagnosis(es) and plan. Bj Jarvis is a 77 y.o. male being evaluated by a video visit encounter for concerns as above. A caregiver was present when appropriate. Due to this being a TeleHealth encounter (During [facility] public health emergency), evaluation of the following organ systems was limited: Vitals/Constitutional/EENT/Resp/CV/GI//MS/Neuro/Skin/Heme-Lymph-Imm. Pursuant to the emergency declaration under the [facility]5 waiver authority and the [employer] Act, this Virtual  Visit was conducted, with patient's (and/or legal guardian's) consent, to reduce the patient's risk of exposure to COVID-19 and provide necessary medical care.      Consent: Bj Jarvis, who was seen by synchronous (real-time) audio-video technology, and/or his healthcare decision maker, is aware that this patient-initiated, Telehealth encounter on 4/28/2020 is a billable service, with coverage as determined by his insurance carrier. He is aware that he may receive a bill and has provided verbal consent to proceed: Yes. I spent at least 15 minutes with this established patient, and >50% of the time was spent counseling and/or coordinating care regarding Cardiology [address] were provided through a video synchronous discussion virtually to substitute for in-person clinic visit. Patient and provider were located at their individual homes. This plan was discussed with patient who is in agreement. Thank you for allowing me to participate in patient care. Please feel free to call me if you have any question or concern. Mary Owens MD  Please note: This document has been produced using voice recognition software. Unrecognized errors in transcription may be present.

## 2020-06-24 ENCOUNTER — HOSPITAL ENCOUNTER (OUTPATIENT)
Dept: VASCULAR SURGERY | Age: 70
Discharge: HOME | End: 2020-06-24
Attending: ORTHOPAEDIC SURGERY
Payer: MEDICARE

## 2020-06-24 DIAGNOSIS — M17.11 PRIMARY OSTEOARTHRITIS OF RIGHT KNEE: ICD-10-CM

## 2020-06-24 LAB
RIGHT CFA DIST SYS PSV: 89.8 CM/S
RIGHT CFA PROX SYS PSV: 123.4 CM/S
RIGHT DIST PTA PSV: 72.7 CM/S
RIGHT PERONEAL MID SYS PSV: 72.7 CM/S
RIGHT POP A DIST VEL RATIO: 1.28
RIGHT POP A MID VEL RATIO: 0.88
RIGHT POP A PROX VEL RATIO: 0.7
RIGHT POPLITEAL DIST SYS PSV: 67.8 CM/S
RIGHT POPLITEAL MID SYS PSV: 53.1 CM/S
RIGHT POPLITEAL PROX SYS PSV: 60.4 CM/S
RIGHT PROX PFA A PSV: 71.6 CM/S
RIGHT PROX PTA PSV: 72.7 CM/S
RIGHT PTA MID SYS PSV: 78.8 CM/S
RIGHT SFA DIST VEL RATIO: 0.92
RIGHT SFA MID VEL RATIO: 0.8
RIGHT SFA PROX VEL RATIO: 0.9
RIGHT SUPER FEMORAL DIST SYS PSV: 86.2 CM/S
RIGHT SUPER FEMORAL MID SYS PSV: 94.1 CM/S
RIGHT SUPER FEMORAL PROX SYS PSV: 111.7 CM/S

## 2020-06-24 PROCEDURE — 93926 LOWER EXTREMITY STUDY: CPT

## 2020-07-20 ENCOUNTER — OFFICE VISIT (OUTPATIENT)
Dept: CARDIOLOGY CLINIC | Age: 70
End: 2020-07-20

## 2020-07-20 VITALS
HEART RATE: 87 BPM | OXYGEN SATURATION: 94 % | TEMPERATURE: 97.8 F | HEIGHT: 69 IN | DIASTOLIC BLOOD PRESSURE: 62 MMHG | BODY MASS INDEX: 31.4 KG/M2 | SYSTOLIC BLOOD PRESSURE: 101 MMHG | WEIGHT: 212 LBS

## 2020-07-20 DIAGNOSIS — E78.00 HYPERCHOLESTEREMIA: ICD-10-CM

## 2020-07-20 DIAGNOSIS — I10 ESSENTIAL HYPERTENSION: ICD-10-CM

## 2020-07-20 DIAGNOSIS — Z01.818 PRE-OP TESTING: Primary | ICD-10-CM

## 2020-07-20 DIAGNOSIS — Z01.818 PRE-OP EVALUATION: ICD-10-CM

## 2020-07-20 NOTE — PROGRESS NOTES
[facility]    Mr. Melda Mcardle is a 72 y.o. male with a history of diabetes, hypertension, hyperlipidemia and DJD, sleep apnea    Patient is here today for follow-up appointment. Patient is considering and scheduled for right knee replacement surgery. He did tolerate left knee replacement surgery last year without any significant cardiac event. His functional status is limited because of the right knee pain. He denies any symptoms that is concerning for unstable coronary symptoms or decompensated heart failure. He denies any presyncope or syncope. Past Medical History:   Diagnosis Date    Arthritis     Degenerative disc disease, lumbar     Diabetes ([address]     HLD (hyperlipidemia)     Hypertension     Intracranial hemorrhage, cerebellar ([address] 2018    no residual    PPD positive, treated 2018    was treated    Sleep apnea     on cpap    Spondylosis of lumbar spine          Past Surgical History:   Procedure Laterality Date    COLONOSCOPY  10/13/2015 Return 10/14/2025    Dr. Kalpana Jones; screening    HX COLONOSCOPY      HX KNEE ARTHROSCOPY Right 1986    right knee    HX WRIST FRACTURE TX Left     ORIF       Current Outpatient Medications   Medication Sig    amLODIPine (NORVASC) 5 mg tablet Take 1 Tab by mouth daily.  atorvastatin (LIPITOR) 10 mg tablet Take 1 Tab by mouth daily.  losartan (COZAAR) 50 mg tablet Take 1 Tab by mouth daily.  metoprolol tartrate (LOPRESSOR) 25 mg tablet Take 1 Tab by mouth two (2) times a day.  acetaminophen (TYLENOL) 500 mg tablet Take 500 mg by mouth every six (6) hours as needed for Pain.  HYDROcodone-acetaminophen (NORCO) 5-325 mg per tablet Take  by mouth two (2) times daily as needed.  cetirizine (ZYRTEC) 10 mg tablet Take 1 Tab by mouth daily.  metFORMIN ER (GLUCOPHAGE XR) 500 mg tablet Take 2 tabs po daily with a meal (Patient taking differently: daily (with dinner).  Take 2 tabs po daily with a meal)    SITagliptin (JANUVIA) 50 mg tablet Take 1 Tab by mouth daily.  sildenafil citrate (VIAGRA) 100 mg tablet Take 1 Tab by mouth as needed (E.D.). Once daily    tamsulosin (FLOMAX) 0.4 mg capsule Take 1 Cap by mouth daily.  melatonin 5 mg tablet Take  by mouth. No current facility-administered medications for this visit. Allergies and Sensitivities:  No Known Allergies    Family History:  Family History   Problem Relation Age of Onset    No Known Problems Mother     No Known Problems Father        Social History:  Social History     Tobacco Use    Smoking status: Never Smoker    Smokeless tobacco: Never Used   Substance Use Topics    Alcohol use: Yes     Alcohol/week: 1.7 standard drinks     Types: 2 Cans of beer per week    Drug use: No     He  reports that he has never smoked. He has never used smokeless tobacco.  He  reports current alcohol use of about 1.7 standard drinks of alcohol per week. Review of Systems:  Cardiac symptoms as noted above in HPI. All others negative. Denies fatigue, malaise, skin rash, joint pain, blurring vision, photophobia, neck pain, hemoptysis, chronic cough, nausea, vomiting, hematuria, burning micturition, BRBPR, chronic headaches. Physical Exam:  BP Readings from Last 3 Encounters:   07/20/20 101/62   04/28/20 122/79   02/06/20 122/70         Pulse Readings from Last 3 Encounters:   07/20/20 87   02/06/20 68   02/06/20 68          Wt Readings from Last 3 Encounters:   07/20/20 212 lb (96.2 kg)   04/28/20 210 lb (95.3 kg)   01/23/20 213 lb (96.6 kg)       Objective:   Vital Signs: (As obtained by patient/caregiver at home)  Visit Vitals  /62   Pulse 87   Temp 97.8 °F (36.6 °C) (Oral)   Ht 5' 9\" (1.753 m)   Wt 212 lb (96.2 kg)   SpO2 94%   BMI 31.31 kg/m²      Constitutional: Oriented to person, place, and time. HENT: Head: Normocephalic and atraumatic. Neck: No JVD present. Cardiovascular: Regular rhythm.    No murmur, gallop or rubs appreciated  Lung: Breath sounds normal. No respiratory distress. No ronchi or rales appreciated  Abdominal: No tenderness. No rebound and no guarding. Musculoskeletal: There is trace lower extremity edema. No cynosis  Lymphadenopathy:  No cervical or supraclavicular adenopathy appriciated. Review of Data  LABS:   Lab Results   Component Value Date/Time    Sodium 140 01/10/2020 10:17 AM    Potassium 4.2 01/10/2020 10:17 AM    Chloride 109 01/10/2020 10:17 AM    CO2 27 01/10/2020 10:17 AM    Glucose 145 (H) 01/10/2020 10:17 AM    BUN 21 (H) 01/10/2020 10:17 AM    Creatinine 0.74 01/10/2020 10:17 AM     Lipids Latest Ref Rng & Units 6/25/2019 7/13/2018 1/18/2018 7/12/2017 2/8/2017   Chol, Total <200 MG/ 168 167 200(H) 177   HDL 40 - 60 MG/DL 48 60 44 58 53   LDL 0 - 100 MG/DL 44.4 92.8 93 120. 4(H) 92.6   Trig <150 MG/ 76 151(H) 108 157(H)   Chol/HDL Ratio 0 - 5.0   2.4 2.8 - 3.4 3.3   Some recent data might be hidden     Lab Results   Component Value Date/Time    ALT (SGPT) 34 01/10/2020 10:17 AM     Lab Results   Component Value Date/Time    Hemoglobin A1c 6.3 (H) 11/14/2019 02:30 PM    Hemoglobin A1c (POC) 5.8 08/11/2015 08:45 AM       EKG  Sinus rhythm. Incomplete right bundle branch block. No ST changes of ischemia    ECHO (01/20)  Left Ventricle Normal cavity size, wall thickness and systolic function (ejection fraction normal). The estimated ejection fraction is 55 - 60%. Visually measured ejection fraction. No regional wall motion abnormality noted. There is mild (grade 1) left ventricular diastolic dysfunction. Wall Scoring The left ventricular wall motion is normal.            Left Atrium Mildly dilated left atrium. Left Atrium volume index is 33 mL/m2. The volume is normal.   Right Ventricle Normal cavity size and global systolic function. Right Atrium Normal cavity size. Aortic Valve Normal valve structure, trileaflet valve structure and no stenosis.  Trace aortic valve regurgitation. Mitral Valve No stenosis. Mitral valve non-specific thickening. Trace regurgitation. Tricuspid Valve No stenosis. Non-specific thickening. Trace regurgitation. Pulmonary arterial systolic pressure is 99.4 mmHg. There is no evidence of pulmonary hypertension. Pulmonic Valve Pulmonic valve not well visualized. No stenosis. Trace regurgitation. Aorta Normal aortic root and ascending aorta. STRESS TEST (01/20)  · Baseline ECG: Sinus rhythm. · Gated SPECT: Left ventricular function post-stress was normal. Calculated ejection fraction is 68%. · Myocardial perfusion imaging supports a low risk stress test.  · Technically difficult study. Inferior perfusion defect is most consistent with diaphragmatic attenuation artifact. There was no convincing evidence of significant reversible defect to suggest ongoing major ischemia. IMPRESSION & PLAN:  Mr. Cele Gustafson is 70-year-old male with multiple medical problem    Hypertension:  BP stable. Continue current medication    Hyperlipidemia:  Currently on atorvastatin. Last LDL 44. Continue same PCP is checking lipid profile regularly    Diabetes:  Goal hemoglobin A1c less than 7 is recommended. Defer to PCP. Last A1c less 6.3 per patient    Preoperative evaluation:  Patient is scheduled for right knee replacement surgery surgery   Patient tolerated left knee replacement surgery last year without any complication. Currently she has no symptoms to suggest unstable coronary syndrome or decompensated heart failure. NO evidence of fluid overload on exam. NO presyncope or syncope  ECHO and nuclear stress test as mentioned above in January 2020, low risk  Further cardiac work up not at this time. Patient will be at lowintermediate risk for cardiac complications. DW patient in detail. Continue cardiac medications perioperatively as tolerated      Thank you for allowing me to participate in patient care.  Please feel free to call me if you have any question or concern. Ember Tucker MD  Please note: This document has been produced using voice recognition software. Unrecognized errors in transcription may be present.

## 2020-07-20 NOTE — PROGRESS NOTES
Erika Form presents today for   Chief Complaint   Patient presents with    Surgical Clearance       Erika Form preferred language for health care discussion is english/other. Is someone accompanying this pt?  yes wife    Is the patient using any DME equipment during [address]? Yes cane    Depression Screening:  3 most recent PHQ Screens 7/20/2020   Little interest or pleasure in doing things Not at all   Feeling down, depressed, irritable, or hopeless Not at all   Total Score PHQ 2 0       Learning Assessment:  Learning Assessment 4/29/2015   PRIMARY LEARNER Patient   HIGHEST LEVEL OF EDUCATION - PRIMARY LEARNER  SOME COLLEGE   BARRIERS PRIMARY LEARNER NONE   CO-LEARNER CAREGIVER No   PRIMARY LANGUAGE ENGLISH    NEED No   LEARNER PREFERENCE PRIMARY READING   LEARNING SPECIAL TOPICS no   ANSWERED BY self   RELATIONSHIP SELF       Abuse Screening:  Abuse Screening Questionnaire 4/28/2020   Do you ever feel afraid of your partner? N   Are you in a relationship with someone who physically or mentally threatens you? N   Is it safe for you to go home? Y       Fall Risk  Fall Risk Assessment, last 12 mths 7/20/2020   Able to walk? Yes   Fall in past 12 months? No       Pt currently taking Anticoagulant therapy? no    Coordination of Care:  1. Have you been to the ER, urgent care clinic since your last visit? Hospitalized since your last visit? no    2. Have you seen or consulted any other health care providers outside of the [address] [place] [address] since your last visit? Include any pap smears or colon screening.   yes

## 2020-07-24 ENCOUNTER — HOSPITAL ENCOUNTER (OUTPATIENT)
Dept: GENERAL RADIOLOGY | Age: 70
Discharge: HOME | End: 2020-07-24
Payer: MEDICARE

## 2020-07-24 ENCOUNTER — HOSPITAL ENCOUNTER (OUTPATIENT)
Dept: PREADMISSION TESTING | Age: 70
Discharge: HOME | End: 2020-07-24
Payer: MEDICARE

## 2020-07-24 DIAGNOSIS — Z01.818 PRE-OP EXAMINATION: ICD-10-CM

## 2020-07-24 LAB
ABO + RH BLD: NORMAL
ALBUMIN SERPL-MCNC: 4 G/DL (ref 3.4–5)
ALBUMIN/GLOB SERPL: 1.1 {RATIO} (ref 0.8–1.7)
ALP SERPL-CCNC: 74 U/L (ref 45–117)
ALT SERPL-CCNC: 23 U/L (ref 16–61)
ANION GAP SERPL CALC-SCNC: 6 MMOL/L (ref 3–18)
APPEARANCE UR: CLEAR
APTT PPP: 32.9 SEC (ref 23–36.4)
AST SERPL-CCNC: 12 U/L (ref 10–38)
ATRIAL RATE: 97 BPM
BILIRUB SERPL-MCNC: 1.3 MG/DL (ref 0.2–1)
BILIRUB UR QL: NEGATIVE
BLOOD GROUP ANTIBODIES SERPL: NORMAL
BUN SERPL-MCNC: 12 MG/DL (ref 7–18)
BUN/CREAT SERPL: 15 (ref 12–20)
CALCIUM SERPL-MCNC: 8.7 MG/DL (ref 8.5–10.1)
CALCULATED R AXIS, ECG10: -25 DEGREES
CALCULATED T AXIS, ECG11: 63 DEGREES
CHLORIDE SERPL-SCNC: 107 MMOL/L (ref 100–111)
CO2 SERPL-SCNC: 27 MMOL/L (ref 21–32)
COLOR UR: YELLOW
CREAT SERPL-MCNC: 0.8 MG/DL (ref 0.6–1.3)
DIAGNOSIS, 93000: NORMAL
ERYTHROCYTE [DISTWIDTH] IN BLOOD BY AUTOMATED COUNT: 13.5 % (ref 11.6–14.5)
GLOBULIN SER CALC-MCNC: 3.5 G/DL (ref 2–4)
GLUCOSE SERPL-MCNC: 83 MG/DL (ref 74–99)
GLUCOSE UR STRIP.AUTO-MCNC: NEGATIVE MG/DL
HBA1C MFR BLD: 6.3 % (ref 4.2–5.6)
HCT VFR BLD AUTO: 43.8 % (ref 36–48)
HGB BLD-MCNC: 15 G/DL (ref 13–16)
HGB UR QL STRIP: NEGATIVE
INR PPP: 1 (ref 0.8–1.2)
KETONES UR QL STRIP.AUTO: NEGATIVE MG/DL
LEUKOCYTE ESTERASE UR QL STRIP.AUTO: NEGATIVE
MCH RBC QN AUTO: 29.1 PG (ref 24–34)
MCHC RBC AUTO-ENTMCNC: 34.2 G/DL (ref 31–37)
MCV RBC AUTO: 85 FL (ref 74–97)
NITRITE UR QL STRIP.AUTO: NEGATIVE
PH UR STRIP: 5 [PH] (ref 5–8)
PLATELET # BLD AUTO: 238 K/UL (ref 135–420)
PMV BLD AUTO: 9.3 FL (ref 9.2–11.8)
POTASSIUM SERPL-SCNC: 4.1 MMOL/L (ref 3.5–5.5)
PROT SERPL-MCNC: 7.5 G/DL (ref 6.4–8.2)
PROT UR STRIP-MCNC: NEGATIVE MG/DL
PROTHROMBIN TIME: 13.4 SEC (ref 11.5–15.2)
Q-T INTERVAL, ECG07: 364 MS
QRS DURATION, ECG06: 94 MS
QTC CALCULATION (BEZET), ECG08: 430 MS
RBC # BLD AUTO: 5.15 M/UL (ref 4.7–5.5)
SODIUM SERPL-SCNC: 140 MMOL/L (ref 136–145)
SP GR UR REFRACTOMETRY: 1.01 (ref 1–1.03)
SPECIMEN EXP DATE BLD: NORMAL
UROBILINOGEN UR QL STRIP.AUTO: 0.2 EU/DL (ref 0.2–1)
VENTRICULAR RATE, ECG03: 84 BPM
WBC # BLD AUTO: 7.5 K/UL (ref 4.6–13.2)

## 2020-07-24 PROCEDURE — 81003 URINALYSIS AUTO W/O SCOPE: CPT

## 2020-07-24 PROCEDURE — 83036 HEMOGLOBIN GLYCOSYLATED A1C: CPT

## 2020-07-24 PROCEDURE — 80053 COMPREHEN METABOLIC PANEL: CPT

## 2020-07-24 PROCEDURE — 36415 COLL VENOUS BLD VENIPUNCTURE: CPT

## 2020-07-24 PROCEDURE — 71046 X-RAY EXAM CHEST 2 VIEWS: CPT

## 2020-07-24 PROCEDURE — 85610 PROTHROMBIN TIME: CPT

## 2020-07-24 PROCEDURE — 86900 BLOOD TYPING SEROLOGIC ABO: CPT

## 2020-07-24 PROCEDURE — 87086 URINE CULTURE/COLONY COUNT: CPT

## 2020-07-24 PROCEDURE — 93005 ELECTROCARDIOGRAM TRACING: CPT

## 2020-07-24 PROCEDURE — 85027 COMPLETE CBC AUTOMATED: CPT

## 2020-07-24 PROCEDURE — 87635 SARS-COV-2 COVID-19 AMP PRB: CPT

## 2020-07-24 PROCEDURE — 85730 THROMBOPLASTIN TIME PARTIAL: CPT

## 2020-07-25 LAB
BACTERIA SPEC CULT: NORMAL
SARS-COV-2, COV2NT: NOT DETECTED
SERVICE CMNT-IMP: NORMAL

## 2020-07-28 ENCOUNTER — TELEPHONE (OUTPATIENT)
Dept: CARDIOLOGY CLINIC | Age: 70
End: 2020-07-28

## 2020-07-28 NOTE — LETTER
7/28/2020 Mr. Lynda Goddard [address] [address] To whom it may concern:  
 
Lynda Goddard was seen in our office on July 20, 2020 for cardiac evaluation. I have reviewed the most recent EKG completed July 24, 2020. Patient is currently on metoprolol to assist with Atrial fibrillation. It is my recommendation that patient will be at lowintermediate risk for cardiac complications for right total knee replacement as long as his heart rate is below 90 and he is asymptomatic. Please feel free to contact our office if you have any questions regarding this patient. Sincerely, Nabila Younger MD

## 2020-07-28 NOTE — TELEPHONE ENCOUNTER
Incoming from pt. Two patient Identifiers confirmed. Advised he received an ekg at the hospital and was advised that he had a fib. He stated he was advised he needed to be on medication for afib. Advised I would print ekg for  Dr Jorge Mota to review. Per pts wife his PCP advised she would not clear pt for sx unless she was aware he was on medication for A fib. Pharmacy : [facility] Will review and advise pt.

## 2020-07-28 NOTE — TELEPHONE ENCOUNTER
Dr Edinson Connelly office would like written letter stating patient is clear for surgery as stated in connect care note.   Please fax to [phone]

## 2020-07-28 NOTE — TELEPHONE ENCOUNTER
Contacted pt at [facility] number. Two patient Identifiers confirmed. Advised per Dr Osmin Tejeda notes below.   Pt verbalized understanding and scheduled for eval Monday, August 17, 2020 01:00 PM .

## 2020-08-17 ENCOUNTER — OFFICE VISIT (OUTPATIENT)
Dept: CARDIOLOGY CLINIC | Age: 70
End: 2020-08-17

## 2020-08-17 VITALS
HEART RATE: 77 BPM | SYSTOLIC BLOOD PRESSURE: 121 MMHG | DIASTOLIC BLOOD PRESSURE: 79 MMHG | WEIGHT: 220 LBS | TEMPERATURE: 98 F | HEIGHT: 69 IN | OXYGEN SATURATION: 97 % | BODY MASS INDEX: 32.58 KG/M2

## 2020-08-17 DIAGNOSIS — I48.91 ATRIAL FIBRILLATION, UNSPECIFIED TYPE (HCC): Primary | ICD-10-CM

## 2020-08-17 RX ORDER — ASPIRIN 81 MG/1
81 TABLET ORAL DAILY
Qty: 90 TAB | Refills: 3 | Status: SHIPPED | OUTPATIENT
Start: 2020-08-17 | End: 2021-11-09 | Stop reason: ALTCHOICE

## 2020-08-17 NOTE — PROGRESS NOTES
[facility]    Mr. Jordi Elliott is a 72 y.o. male with a history of diabetes, hypertension, hyperlipidemia and DJD, sleep apnea    Patient is here today for add-on appointment. Patient was scheduled for right knee replacement surgery and underwent EKG as a preoperative evaluation. EKG showed atrial fibrillation so the surgery was and he was asked to come see me. He denies any symptoms related to atrial fibrillation. He denies any awareness of atrial fibrillation he denies any chest pain, chest tightness. He denies any shortness of breath concerning for decompensated heart failure. He denies any palpitation, presyncope or syncope. Past Medical History:   Diagnosis Date    Arthritis     Degenerative disc disease, lumbar     Diabetes ([address]     HLD (hyperlipidemia)     Hypertension     Intracranial hemorrhage, cerebellar ([address] 2018    no residual    PPD positive, treated 2018    was treated    Sleep apnea     on cpap    Spondylosis of lumbar spine          Past Surgical History:   Procedure Laterality Date    COLONOSCOPY  10/13/2015 Return 10/14/2025    Dr. Dorina aRiney; screening    HX COLONOSCOPY      HX KNEE ARTHROSCOPY Right 1986    right knee    HX WRIST FRACTURE TX Left     ORIF       Current Outpatient Medications   Medication Sig    amLODIPine (NORVASC) 5 mg tablet Take 1 Tab by mouth daily.  atorvastatin (LIPITOR) 10 mg tablet Take 1 Tab by mouth daily.  losartan (COZAAR) 50 mg tablet Take 1 Tab by mouth daily.  metoprolol tartrate (LOPRESSOR) 25 mg tablet Take 1 Tab by mouth two (2) times a day.  acetaminophen (TYLENOL) 500 mg tablet Take 500 mg by mouth every six (6) hours as needed for Pain.  HYDROcodone-acetaminophen (NORCO) 5-325 mg per tablet Take  by mouth two (2) times daily as needed.  cetirizine (ZYRTEC) 10 mg tablet Take 1 Tab by mouth daily.     metFORMIN ER (GLUCOPHAGE XR) 500 mg tablet Take 2 tabs po daily with a meal (Patient taking differently: daily (with dinner). Take 2 tabs po daily with a meal)    SITagliptin (JANUVIA) 50 mg tablet Take 1 Tab by mouth daily.  sildenafil citrate (VIAGRA) 100 mg tablet Take 1 Tab by mouth as needed (E.D.). Once daily    tamsulosin (FLOMAX) 0.4 mg capsule Take 1 Cap by mouth daily.  melatonin 5 mg tablet Take  by mouth. No current facility-administered medications for this visit. Allergies and Sensitivities:  No Known Allergies    Family History:  Family History   Problem Relation Age of Onset    No Known Problems Mother     No Known Problems Father        Social History:  Social History     Tobacco Use    Smoking status: Never Smoker    Smokeless tobacco: Never Used   Substance Use Topics    Alcohol use: Yes     Alcohol/week: 1.7 standard drinks     Types: 2 Cans of beer per week    Drug use: No     He  reports that he has never smoked. He has never used smokeless tobacco.  He  reports current alcohol use of about 1.7 standard drinks of alcohol per week. Review of Systems:  Cardiac symptoms as noted above in HPI. All others negative. Denies fatigue, malaise, skin rash, joint pain, blurring vision, photophobia, neck pain, hemoptysis, chronic cough, nausea, vomiting, hematuria, burning micturition, BRBPR, chronic headaches. Physical Exam:  BP Readings from Last 3 Encounters:   07/20/20 101/62   04/28/20 122/79   02/06/20 122/70         Pulse Readings from Last 3 Encounters:   07/20/20 87   02/06/20 68   02/06/20 68          Wt Readings from Last 3 Encounters:   07/20/20 212 lb (96.2 kg)   04/28/20 210 lb (95.3 kg)   01/23/20 213 lb (96.6 kg)       Objective:   Vital Signs: (As obtained by patient/caregiver at home)  There were no vitals taken for this visit. Constitutional: Oriented to person, place, and time. HENT: Head: Normocephalic and atraumatic. Neck: No JVD present. Cardiovascular: Regular rhythm.    No murmur, gallop or rubs appreciated  Lung: Breath sounds normal. No respiratory distress. No ronchi or rales appreciated  Abdominal: No tenderness. No rebound and no guarding. Musculoskeletal: There is trace lower extremity edema. No cynosis  Lymphadenopathy:  No cervical or supraclavicular adenopathy appriciated. Review of Data  LABS:   Lab Results   Component Value Date/Time    Sodium 140 07/24/2020 12:19 PM    Potassium 4.1 07/24/2020 12:19 PM    Chloride 107 07/24/2020 12:19 PM    CO2 27 07/24/2020 12:19 PM    Glucose 83 07/24/2020 12:19 PM    BUN 12 07/24/2020 12:19 PM    Creatinine 0.80 07/24/2020 12:19 PM     Lipids Latest Ref Rng & Units 6/25/2019 7/13/2018 1/18/2018 7/12/2017 2/8/2017   Chol, Total <200 MG/ 168 167 200(H) 177   HDL 40 - 60 MG/DL 48 60 44 58 53   LDL 0 - 100 MG/DL 44.4 92.8 93 120. 4(H) 92.6   Trig <150 MG/ 76 151(H) 108 157(H)   Chol/HDL Ratio 0 - 5.0   2.4 2.8 - 3.4 3.3   Some recent data might be hidden     Lab Results   Component Value Date/Time    ALT (SGPT) 23 07/24/2020 12:19 PM     Lab Results   Component Value Date/Time    Hemoglobin A1c 6.3 (H) 07/24/2020 12:19 PM    Hemoglobin A1c (POC) 5.8 08/11/2015 08:45 AM       EKG  Sinus rhythm. Incomplete right bundle branch block. No ST changes of ischemia    ECHO (01/20)  Left Ventricle Normal cavity size, wall thickness and systolic function (ejection fraction normal). The estimated ejection fraction is 55 - 60%. Visually measured ejection fraction. No regional wall motion abnormality noted. There is mild (grade 1) left ventricular diastolic dysfunction. Wall Scoring The left ventricular wall motion is normal.            Left Atrium Mildly dilated left atrium. Left Atrium volume index is 33 mL/m2. The volume is normal.   Right Ventricle Normal cavity size and global systolic function. Right Atrium Normal cavity size. Aortic Valve Normal valve structure, trileaflet valve structure and no stenosis. Trace aortic valve regurgitation. Mitral Valve No stenosis. Mitral valve non-specific thickening. Trace regurgitation. Tricuspid Valve No stenosis. Non-specific thickening. Trace regurgitation. Pulmonary arterial systolic pressure is 22.0 mmHg. There is no evidence of pulmonary hypertension. Pulmonic Valve Pulmonic valve not well visualized. No stenosis. Trace regurgitation. Aorta Normal aortic root and ascending aorta. STRESS TEST (01/20)  · Baseline ECG: Sinus rhythm. · Gated SPECT: Left ventricular function post-stress was normal. Calculated ejection fraction is 68%. · Myocardial perfusion imaging supports a low risk stress test.  · Technically difficult study. Inferior perfusion defect is most consistent with diaphragmatic attenuation artifact. There was no convincing evidence of significant reversible defect to suggest ongoing major ischemia. IMPRESSION & PLAN:  Mr. Enid Rodriguez is 72 y.o. male with multiple medical problem    Hypertension:  Blood pressure 120/79. Continue same    Hyperlipidemia:  Currently on atorvastatin. Last LDL 44. Continue same PCP is checking lipid profile regularly    Diabetes:  Goal hemoglobin A1c less than 7 is recommended. Defer to PCP. Last A1c less 6.3 per patient    Atrial fibrillation:  New diagnosis. Confirmed by EKG in August 2020  Remains in atrial fibrillation which was confirmed today by EKG and by exam  Heart rate control with current dose of Lopressor. Continue same  Discussed about a.fib and stroke prophylaxis. ASA vs. Anticoagulation ( with coumadin / newer agent ). RIsk, benefit, side effects of medications and alternatives were discussed with patient regarding each medications. After lengthy discussion, patient is agreeable for stroke prophylaxis with dual anti-coagulant agent Eliquis.   Side effect discussed with the patient  Prior to starting Eliquis, I am going to discuss with patient's neurosurgery team for use of Eliquis as patient had a intracranial hemorrhage from hypertensive crisis in 2018. In my opinion he is okay to start Eliquis however we would like to make sure it is okay by neurosurgical team.  They are requesting a call back from them. Patient is aware that he is not good to start Eliquis until he hears from either neurosurgery team or us. Meanwhile I have asked him to take only aspirin 81 mg daily until he is okay to start Eliquis when he will stop aspirin  Will order limited echo because of A. fib    Preoperative evaluation:  Patient is scheduled for right knee replacement surgery surgery  Patient tolerated left knee replacement surgery last year without any complication. Currently she has no symptoms to suggest unstable coronary syndrome or decompensated heart failure. He has been diagnosed with atrial fibrillation however he is asymptomatic and has no symptoms. His EF recently has been normal.  His heart rate is controlled with beta-blocker. NO evidence of fluid overload on exam. NO presyncope or syncope  ECHO and nuclear stress test as mentioned above in January 2020, low risk  Further cardiac work up not needed at this time. Patient will be at lowintermediate risk for cardiac complications. DW patient in detail. Continue cardiac medications perioperatively as tolerated      Thank you for allowing me to participate in patient care. Please feel free to call me if you have any question or concern. Syed Davis MD  Please note: This document has been produced using voice recognition software. Unrecognized errors in transcription may be present.

## 2020-08-17 NOTE — PATIENT INSTRUCTIONS
Testing   Echo  Please call [facility]'s central scheduling at [phone]  to schedule an appointment. All testing is completed at [address] [facility], [address]    **call office three days after testing for results**     Medication  Start Aspirin 81 mg daily now    Will start Eliquis 5mg twice daily is cleared by Dr. Yves Ivy neurosurgeon. Stop aspirin once Eliquis is started.      **please allow 24-48 hrs for medication to be escribed to pharmacy**  Labs

## 2020-08-17 NOTE — PROGRESS NOTES
Carolina Gonzalez presents today for   Chief Complaint   Patient presents with    Follow-up       aCrolina Gonzalez preferred language for health care discussion is english/other. Is someone accompanying this pt? Yes wife    Is the patient using any DME equipment during [address]? no    Depression Screening:  3 most recent PHQ Screens 8/17/2020   Little interest or pleasure in doing things Not at all   Feeling down, depressed, irritable, or hopeless Not at all   Total Score PHQ 2 0       Learning Assessment:  Learning Assessment 4/29/2015   PRIMARY LEARNER Patient   HIGHEST LEVEL OF EDUCATION - PRIMARY LEARNER  SOME COLLEGE   BARRIERS PRIMARY LEARNER NONE   CO-LEARNER CAREGIVER No   PRIMARY LANGUAGE ENGLISH    NEED No   LEARNER PREFERENCE PRIMARY READING   LEARNING SPECIAL TOPICS no   ANSWERED BY self   RELATIONSHIP SELF       Abuse Screening:  Abuse Screening Questionnaire 4/28/2020   Do you ever feel afraid of your partner? N   Are you in a relationship with someone who physically or mentally threatens you? N   Is it safe for you to go home? Y       Fall Risk  Fall Risk Assessment, last 12 mths 8/17/2020   Able to walk? Yes   Fall in past 12 months? No       Pt currently taking Anticoagulant therapy? no    Coordination of Care:  1. Have you been to the ER, urgent care clinic since your last visit? Hospitalized since your last visit? no    2. Have you seen or consulted any other health care providers outside of the [address] [place] [address] since your last visit? Include any pap smears or colon screening.   yes

## 2020-09-01 NOTE — PROGRESS NOTES
[address] PHYSICAL THERAPY  [address] [facility] [address],[facility], [address] [facility] - Phone: [phone]  Fax: [phone]  DISCHARGE SUMMARY FOR PHYSICAL THERAPY          Patient Name: Augusta Beth : 1950   Treatment/Medical Diagnosis: Pain in left knee [M25.562]   Onset Date: 20    Referral Source: Kathryn Call MD [place] [facility]): 2/10/20   Prior Hospitalization: See Medical History Provider #: [id number]   Prior Level of Function: Chronic history of difficulty with prolonged walking, standing and stair negotiation   Comorbidities: HTN, Type II Diabetes, R knee pain   Medications: Verified on Patient Summary List   Visits from [facility]: 13 Missed Visits: 0        Goal/Measure of Progress Goal Met? 1. Patient will improve FOTO to >/= 46/100 in order to improve quality of life   Status at last Eval:  Current Status: n/a n/a   2. Patient will demonstrate ability to ambulate with normal mechanics and community distances in order to improve tolerance to recreational activities   Status at last Eval: Able to ambulate community distances with continued use of SPC Current Status: Able to ambulate community distances without AD yes   3. Patient will report reduction in pain at worst to 0-1/10 in order to improve tolerance to household activities. Status at last Eval: 3-4/10 Current Status: 1-2/10 Progressing        Key Functional Changes/Progress: Patient had progressed well with his PT program, however was placed on hold due to COVID-19. Patient demonstrated excellent knowledge of his HEP and was educated on performing his program independently. At this time, patient will be discharged from PT.    G-Codes (GP): n/a  Assessments/Recommendations: Other: COVID-19    If you have any questions/comments please contact us directly at [phone]. Thank you for allowing us to assist in the care of your patient. Therapist Signature:  Alonzo Petersen Orlin Eaton, PT Date: 9/1/20   Reporting Period: 3/23/20-9/1/20 Time: 11:35 AM

## 2020-10-23 ENCOUNTER — HOSPITAL ENCOUNTER (OUTPATIENT)
Dept: PREADMISSION TESTING | Age: 70
Discharge: HOME | End: 2020-10-23
Payer: MEDICARE

## 2020-10-23 ENCOUNTER — TRANSCRIBE ORDER (OUTPATIENT)
Dept: REGISTRATION | Age: 70
End: 2020-10-23

## 2020-10-23 DIAGNOSIS — I10 ESSENTIAL HYPERTENSION, MALIGNANT: ICD-10-CM

## 2020-10-23 DIAGNOSIS — M17.11 PRIMARY OSTEOARTHRITIS OF RIGHT KNEE: ICD-10-CM

## 2020-10-23 DIAGNOSIS — I10 ESSENTIAL HYPERTENSION, MALIGNANT: Primary | ICD-10-CM

## 2020-10-23 LAB
ABO + RH BLD: NORMAL
ALBUMIN SERPL-MCNC: 4.2 G/DL (ref 3.4–5)
ALBUMIN/GLOB SERPL: 1.3 {RATIO} (ref 0.8–1.7)
ALP SERPL-CCNC: 77 U/L (ref 45–117)
ALT SERPL-CCNC: 29 U/L (ref 16–61)
ANION GAP SERPL CALC-SCNC: 8 MMOL/L (ref 3–18)
APPEARANCE UR: CLEAR
APTT PPP: 37.3 SEC (ref 23–36.4)
AST SERPL-CCNC: 18 U/L (ref 10–38)
BILIRUB SERPL-MCNC: 1.3 MG/DL (ref 0.2–1)
BILIRUB UR QL: NEGATIVE
BLOOD GROUP ANTIBODIES SERPL: NORMAL
BUN SERPL-MCNC: 15 MG/DL (ref 7–18)
BUN/CREAT SERPL: 15 (ref 12–20)
CALCIUM SERPL-MCNC: 9.2 MG/DL (ref 8.5–10.1)
CHLORIDE SERPL-SCNC: 104 MMOL/L (ref 100–111)
CO2 SERPL-SCNC: 28 MMOL/L (ref 21–32)
COLOR UR: YELLOW
CREAT SERPL-MCNC: 0.98 MG/DL (ref 0.6–1.3)
ERYTHROCYTE [DISTWIDTH] IN BLOOD BY AUTOMATED COUNT: 12.7 % (ref 11.6–14.5)
GLOBULIN SER CALC-MCNC: 3.3 G/DL (ref 2–4)
GLUCOSE SERPL-MCNC: 160 MG/DL (ref 74–99)
GLUCOSE UR STRIP.AUTO-MCNC: NEGATIVE MG/DL
HBA1C MFR BLD: 6.2 % (ref 4.2–5.6)
HCT VFR BLD AUTO: 45.1 % (ref 36–48)
HGB BLD-MCNC: 15.6 G/DL (ref 13–16)
HGB UR QL STRIP: NEGATIVE
INR PPP: 1.1 (ref 0.8–1.2)
KETONES UR QL STRIP.AUTO: NEGATIVE MG/DL
LEUKOCYTE ESTERASE UR QL STRIP.AUTO: NEGATIVE
MCH RBC QN AUTO: 29.7 PG (ref 24–34)
MCHC RBC AUTO-ENTMCNC: 34.6 G/DL (ref 31–37)
MCV RBC AUTO: 85.9 FL (ref 74–97)
NITRITE UR QL STRIP.AUTO: NEGATIVE
PH UR STRIP: 5.5 [PH] (ref 5–8)
PLATELET # BLD AUTO: 244 K/UL (ref 135–420)
PMV BLD AUTO: 9.9 FL (ref 9.2–11.8)
POTASSIUM SERPL-SCNC: 4 MMOL/L (ref 3.5–5.5)
PROT SERPL-MCNC: 7.5 G/DL (ref 6.4–8.2)
PROT UR STRIP-MCNC: NEGATIVE MG/DL
PROTHROMBIN TIME: 14.4 SEC (ref 11.5–15.2)
RBC # BLD AUTO: 5.25 M/UL (ref 4.7–5.5)
SODIUM SERPL-SCNC: 140 MMOL/L (ref 136–145)
SP GR UR REFRACTOMETRY: 1.02 (ref 1–1.03)
SPECIMEN EXP DATE BLD: NORMAL
UROBILINOGEN UR QL STRIP.AUTO: 1 EU/DL (ref 0.2–1)
WBC # BLD AUTO: 9 K/UL (ref 4.6–13.2)

## 2020-10-23 PROCEDURE — 83036 HEMOGLOBIN GLYCOSYLATED A1C: CPT

## 2020-10-23 PROCEDURE — 80053 COMPREHEN METABOLIC PANEL: CPT

## 2020-10-23 PROCEDURE — 81003 URINALYSIS AUTO W/O SCOPE: CPT

## 2020-10-23 PROCEDURE — 87635 SARS-COV-2 COVID-19 AMP PRB: CPT

## 2020-10-23 PROCEDURE — 85027 COMPLETE CBC AUTOMATED: CPT

## 2020-10-23 PROCEDURE — 36415 COLL VENOUS BLD VENIPUNCTURE: CPT

## 2020-10-23 PROCEDURE — 85730 THROMBOPLASTIN TIME PARTIAL: CPT

## 2020-10-23 PROCEDURE — 86900 BLOOD TYPING SEROLOGIC ABO: CPT

## 2020-10-23 PROCEDURE — 85610 PROTHROMBIN TIME: CPT

## 2020-10-23 PROCEDURE — 87086 URINE CULTURE/COLONY COUNT: CPT

## 2020-10-24 LAB
BACTERIA SPEC CULT: NORMAL
SARS-COV-2, COV2NT: NOT DETECTED
SERVICE CMNT-IMP: NORMAL

## 2020-10-27 ENCOUNTER — ANESTHESIA EVENT (OUTPATIENT)
Dept: SURGERY | Age: 70
DRG: 470 | End: 2020-10-27
Payer: MEDICARE

## 2020-10-28 ENCOUNTER — HOSPITAL ENCOUNTER (INPATIENT)
Age: 70
LOS: 1 days | Discharge: HOME HEALTH | DRG: 470 | End: 2020-10-29
Attending: ORTHOPAEDIC SURGERY | Admitting: ORTHOPAEDIC SURGERY
Payer: MEDICARE

## 2020-10-28 ENCOUNTER — ANESTHESIA (OUTPATIENT)
Dept: SURGERY | Age: 70
DRG: 470 | End: 2020-10-28
Payer: MEDICARE

## 2020-10-28 LAB
GLUCOSE BLD STRIP.AUTO-MCNC: 127 MG/DL (ref 70–110)
GLUCOSE BLD STRIP.AUTO-MCNC: 128 MG/DL (ref 70–110)
GLUCOSE BLD STRIP.AUTO-MCNC: 134 MG/DL (ref 70–110)
HCT VFR BLD AUTO: 38.6 % (ref 36–48)
HGB BLD-MCNC: 13.2 G/DL (ref 13–16)

## 2020-10-28 PROCEDURE — 77030003666 HC NDL SPINAL BD -A: Performed by: ORTHOPAEDIC SURGERY

## 2020-10-28 PROCEDURE — 74011250637 HC RX REV CODE- 250/637: Performed by: ORTHOPAEDIC SURGERY

## 2020-10-28 PROCEDURE — 77030012411 HC DRN WND CARD -A: Performed by: ORTHOPAEDIC SURGERY

## 2020-10-28 PROCEDURE — 74011000250 HC RX REV CODE- 250: Performed by: NURSE ANESTHETIST, CERTIFIED REGISTERED

## 2020-10-28 PROCEDURE — 74011250636 HC RX REV CODE- 250/636: Performed by: ORTHOPAEDIC SURGERY

## 2020-10-28 PROCEDURE — 77030026438 HC STYL ET INTUB CARD -A: Performed by: ANESTHESIOLOGY

## 2020-10-28 PROCEDURE — 77030008683 HC TU ET CUF COVD -A: Performed by: ANESTHESIOLOGY

## 2020-10-28 PROCEDURE — 01402 ANES OPN/ARTH TOT KNE ARTHRP: CPT | Performed by: NURSE ANESTHETIST, CERTIFIED REGISTERED

## 2020-10-28 PROCEDURE — C9290 INJ, BUPIVACAINE LIPOSOME: HCPCS | Performed by: ORTHOPAEDIC SURGERY

## 2020-10-28 PROCEDURE — 74011250636 HC RX REV CODE- 250/636: Performed by: NURSE ANESTHETIST, CERTIFIED REGISTERED

## 2020-10-28 PROCEDURE — 77030002933 HC SUT MCRYL J&J -A: Performed by: ORTHOPAEDIC SURGERY

## 2020-10-28 PROCEDURE — 74011250637 HC RX REV CODE- 250/637: Performed by: NURSE ANESTHETIST, CERTIFIED REGISTERED

## 2020-10-28 PROCEDURE — 74011000250 HC RX REV CODE- 250: Performed by: ANESTHESIOLOGY

## 2020-10-28 PROCEDURE — 74011000258 HC RX REV CODE- 258: Performed by: ORTHOPAEDIC SURGERY

## 2020-10-28 PROCEDURE — C1776 JOINT DEVICE (IMPLANTABLE): HCPCS | Performed by: ORTHOPAEDIC SURGERY

## 2020-10-28 PROCEDURE — 76942 ECHO GUIDE FOR BIOPSY: CPT | Performed by: ANESTHESIOLOGY

## 2020-10-28 PROCEDURE — 2709999900 HC NON-CHARGEABLE SUPPLY: Performed by: ORTHOPAEDIC SURGERY

## 2020-10-28 PROCEDURE — 77030006835 HC BLD SAW SAG STRY -B: Performed by: ORTHOPAEDIC SURGERY

## 2020-10-28 PROCEDURE — 97116 GAIT TRAINING THERAPY: CPT

## 2020-10-28 PROCEDURE — C1713 ANCHOR/SCREW BN/BN,TIS/BN: HCPCS | Performed by: ORTHOPAEDIC SURGERY

## 2020-10-28 PROCEDURE — 77030006812 HC BLD SAW RECIP STRY -B: Performed by: ORTHOPAEDIC SURGERY

## 2020-10-28 PROCEDURE — 77030013708 HC HNDPC SUC IRR PULS STRY –B: Performed by: ORTHOPAEDIC SURGERY

## 2020-10-28 PROCEDURE — 77030000032 HC CUF TRNQT ZIMM -B: Performed by: ORTHOPAEDIC SURGERY

## 2020-10-28 PROCEDURE — 97161 PT EVAL LOW COMPLEX 20 MIN: CPT

## 2020-10-28 PROCEDURE — 0SRC0J9 REPLACEMENT OF RIGHT KNEE JOINT WITH SYNTHETIC SUBSTITUTE, CEMENTED, OPEN APPROACH: ICD-10-PCS | Performed by: ORTHOPAEDIC SURGERY

## 2020-10-28 PROCEDURE — 74011000250 HC RX REV CODE- 250: Performed by: ORTHOPAEDIC SURGERY

## 2020-10-28 PROCEDURE — 74011250636 HC RX REV CODE- 250/636: Performed by: ANESTHESIOLOGY

## 2020-10-28 PROCEDURE — 76010000171 HC OR TIME 2 TO 2.5 HR INTENSV-TIER 1: Performed by: ORTHOPAEDIC SURGERY

## 2020-10-28 PROCEDURE — 77030013079 HC BLNKT BAIR HGGR 3M -A: Performed by: ANESTHESIOLOGY

## 2020-10-28 PROCEDURE — 76210000016 HC OR PH I REC 1 TO 1.5 HR: Performed by: ORTHOPAEDIC SURGERY

## 2020-10-28 PROCEDURE — 36415 COLL VENOUS BLD VENIPUNCTURE: CPT

## 2020-10-28 PROCEDURE — 01402 ANES OPN/ARTH TOT KNE ARTHRP: CPT | Performed by: ANESTHESIOLOGY

## 2020-10-28 PROCEDURE — 77030027138 HC INCENT SPIROMETER -A: Performed by: ORTHOPAEDIC SURGERY

## 2020-10-28 PROCEDURE — 77030008462 HC STPLR SKN PROX J&J -A: Performed by: ORTHOPAEDIC SURGERY

## 2020-10-28 PROCEDURE — 77030003029 HC SUT VCRL J&J -B: Performed by: ORTHOPAEDIC SURGERY

## 2020-10-28 PROCEDURE — 77030031139 HC SUT VCRL2 J&J -A: Performed by: ORTHOPAEDIC SURGERY

## 2020-10-28 PROCEDURE — 85018 HEMOGLOBIN: CPT

## 2020-10-28 PROCEDURE — 77010033678 HC OXYGEN DAILY

## 2020-10-28 PROCEDURE — 2709999900 HC NON-CHARGEABLE SUPPLY

## 2020-10-28 PROCEDURE — 64450 NJX AA&/STRD OTHER PN/BRANCH: CPT | Performed by: ANESTHESIOLOGY

## 2020-10-28 PROCEDURE — 82962 GLUCOSE BLOOD TEST: CPT

## 2020-10-28 PROCEDURE — 77030040361 HC SLV COMPR DVT MDII -B: Performed by: ORTHOPAEDIC SURGERY

## 2020-10-28 PROCEDURE — 77030040922 HC BLNKT HYPOTHRM STRY -A: Performed by: ORTHOPAEDIC SURGERY

## 2020-10-28 PROCEDURE — 76060000035 HC ANESTHESIA 2 TO 2.5 HR: Performed by: ORTHOPAEDIC SURGERY

## 2020-10-28 PROCEDURE — 77030019605: Performed by: ORTHOPAEDIC SURGERY

## 2020-10-28 PROCEDURE — 65270000029 HC RM PRIVATE

## 2020-10-28 PROCEDURE — 77030010785: Performed by: ORTHOPAEDIC SURGERY

## 2020-10-28 DEVICE — COMPONENT FEM SZ 7 R KNEE POST STBL CEM ATTUNE: Type: IMPLANTABLE DEVICE | Site: KNEE | Status: FUNCTIONAL

## 2020-10-28 DEVICE — PIN DRL QUIK HI PERF FOR SIG SYS: Type: IMPLANTABLE DEVICE | Site: KNEE | Status: FUNCTIONAL

## 2020-10-28 DEVICE — COMPONENT PAT DIA41MM POLYETH DOME CEM MEDIALIZED ATTUNE: Type: IMPLANTABLE DEVICE | Site: KNEE | Status: FUNCTIONAL

## 2020-10-28 DEVICE — INSERT TIB SZ 7 THK10MM KNEE POST STBL ROT PLATFRM ATTUNE: Type: IMPLANTABLE DEVICE | Site: KNEE | Status: FUNCTIONAL

## 2020-10-28 DEVICE — GUIDEPIN ORTH THRD HI PERF HD SIG: Type: IMPLANTABLE DEVICE | Site: KNEE | Status: FUNCTIONAL

## 2020-10-28 DEVICE — KNEE K1 TOT HEMI STD CEM IMPL CAPPED SYNTHES: Type: IMPLANTABLE DEVICE | Site: KNEE | Status: FUNCTIONAL

## 2020-10-28 DEVICE — BASE TIB SZ 7 CEM PKT ATTUNE RP: Type: IMPLANTABLE DEVICE | Site: KNEE | Status: FUNCTIONAL

## 2020-10-28 RX ORDER — SODIUM CHLORIDE 0.9 % (FLUSH) 0.9 %
5-40 SYRINGE (ML) INJECTION EVERY 8 HOURS
Status: DISCONTINUED | OUTPATIENT
Start: 2020-10-28 | End: 2020-10-28 | Stop reason: HOSPADM

## 2020-10-28 RX ORDER — TAMSULOSIN HYDROCHLORIDE 0.4 MG/1
0.4 CAPSULE ORAL DAILY
Status: DISCONTINUED | OUTPATIENT
Start: 2020-10-29 | End: 2020-10-29 | Stop reason: HOSPADM

## 2020-10-28 RX ORDER — SODIUM CHLORIDE 0.9 % (FLUSH) 0.9 %
5-40 SYRINGE (ML) INJECTION AS NEEDED
Status: DISCONTINUED | OUTPATIENT
Start: 2020-10-28 | End: 2020-10-28 | Stop reason: HOSPADM

## 2020-10-28 RX ORDER — ROPIVACAINE HYDROCHLORIDE 2 MG/ML
INJECTION, SOLUTION EPIDURAL; INFILTRATION; PERINEURAL
Status: COMPLETED | OUTPATIENT
Start: 2020-10-28 | End: 2020-10-28

## 2020-10-28 RX ORDER — MAGNESIUM SULFATE 100 %
4 CRYSTALS MISCELLANEOUS AS NEEDED
Status: DISCONTINUED | OUTPATIENT
Start: 2020-10-28 | End: 2020-10-28 | Stop reason: HOSPADM

## 2020-10-28 RX ORDER — LIDOCAINE HYDROCHLORIDE 20 MG/ML
INJECTION, SOLUTION EPIDURAL; INFILTRATION; INTRACAUDAL; PERINEURAL
Status: COMPLETED | OUTPATIENT
Start: 2020-10-28 | End: 2020-10-28

## 2020-10-28 RX ORDER — PROMETHAZINE HYDROCHLORIDE 25 MG/ML
12.5 INJECTION, SOLUTION INTRAMUSCULAR; INTRAVENOUS AS NEEDED
Status: DISCONTINUED | OUTPATIENT
Start: 2020-10-28 | End: 2020-10-28

## 2020-10-28 RX ORDER — HYDROMORPHONE HYDROCHLORIDE 2 MG/ML
0.5 INJECTION, SOLUTION INTRAMUSCULAR; INTRAVENOUS; SUBCUTANEOUS AS NEEDED
Status: DISCONTINUED | OUTPATIENT
Start: 2020-10-28 | End: 2020-10-28 | Stop reason: HOSPADM

## 2020-10-28 RX ORDER — FENTANYL CITRATE 50 UG/ML
100 INJECTION, SOLUTION INTRAMUSCULAR; INTRAVENOUS ONCE
Status: COMPLETED | OUTPATIENT
Start: 2020-10-28 | End: 2020-10-28

## 2020-10-28 RX ORDER — SODIUM CHLORIDE 0.9 % (FLUSH) 0.9 %
5-40 SYRINGE (ML) INJECTION AS NEEDED
Status: DISCONTINUED | OUTPATIENT
Start: 2020-10-28 | End: 2020-10-29 | Stop reason: HOSPADM

## 2020-10-28 RX ORDER — METOPROLOL TARTRATE 25 MG/1
25 TABLET, FILM COATED ORAL 2 TIMES DAILY
Status: DISCONTINUED | OUTPATIENT
Start: 2020-10-28 | End: 2020-10-29 | Stop reason: HOSPADM

## 2020-10-28 RX ORDER — POLYETHYLENE GLYCOL 3350 17 G/17G
17 POWDER, FOR SOLUTION ORAL DAILY
Status: DISCONTINUED | OUTPATIENT
Start: 2020-10-29 | End: 2020-10-29 | Stop reason: HOSPADM

## 2020-10-28 RX ORDER — ASPIRIN 81 MG/1
81 TABLET ORAL DAILY
Status: DISCONTINUED | OUTPATIENT
Start: 2020-10-29 | End: 2020-10-29 | Stop reason: HOSPADM

## 2020-10-28 RX ORDER — CEFAZOLIN SODIUM 2 G/50ML
2 SOLUTION INTRAVENOUS ONCE
Status: COMPLETED | OUTPATIENT
Start: 2020-10-28 | End: 2020-10-28

## 2020-10-28 RX ORDER — ROCURONIUM BROMIDE 10 MG/ML
INJECTION, SOLUTION INTRAVENOUS AS NEEDED
Status: DISCONTINUED | OUTPATIENT
Start: 2020-10-28 | End: 2020-10-28 | Stop reason: HOSPADM

## 2020-10-28 RX ORDER — SODIUM CHLORIDE 0.9 % (FLUSH) 0.9 %
5-40 SYRINGE (ML) INJECTION AS NEEDED
Status: DISCONTINUED | OUTPATIENT
Start: 2020-10-28 | End: 2020-10-28

## 2020-10-28 RX ORDER — FENTANYL CITRATE 50 UG/ML
INJECTION, SOLUTION INTRAMUSCULAR; INTRAVENOUS AS NEEDED
Status: DISCONTINUED | OUTPATIENT
Start: 2020-10-28 | End: 2020-10-28 | Stop reason: HOSPADM

## 2020-10-28 RX ORDER — PROPOFOL 10 MG/ML
INJECTION, EMULSION INTRAVENOUS AS NEEDED
Status: DISCONTINUED | OUTPATIENT
Start: 2020-10-28 | End: 2020-10-28 | Stop reason: HOSPADM

## 2020-10-28 RX ORDER — TAMSULOSIN HYDROCHLORIDE 0.4 MG/1
0.4 CAPSULE ORAL
Status: COMPLETED | OUTPATIENT
Start: 2020-10-28 | End: 2020-10-28

## 2020-10-28 RX ORDER — ATORVASTATIN CALCIUM 10 MG/1
10 TABLET, FILM COATED ORAL DAILY
Status: DISCONTINUED | OUTPATIENT
Start: 2020-10-29 | End: 2020-10-29 | Stop reason: HOSPADM

## 2020-10-28 RX ORDER — SODIUM CHLORIDE, SODIUM LACTATE, POTASSIUM CHLORIDE, CALCIUM CHLORIDE 600; 310; 30; 20 MG/100ML; MG/100ML; MG/100ML; MG/100ML
75 INJECTION, SOLUTION INTRAVENOUS CONTINUOUS
Status: DISCONTINUED | OUTPATIENT
Start: 2020-10-28 | End: 2020-10-28 | Stop reason: HOSPADM

## 2020-10-28 RX ORDER — DIPHENHYDRAMINE HCL 25 MG
25 CAPSULE ORAL
Status: DISCONTINUED | OUTPATIENT
Start: 2020-10-28 | End: 2020-10-29 | Stop reason: HOSPADM

## 2020-10-28 RX ORDER — CEFAZOLIN SODIUM 1 G/3ML
INJECTION, POWDER, FOR SOLUTION INTRAMUSCULAR; INTRAVENOUS AS NEEDED
Status: DISCONTINUED | OUTPATIENT
Start: 2020-10-28 | End: 2020-10-28 | Stop reason: HOSPADM

## 2020-10-28 RX ORDER — LIDOCAINE HYDROCHLORIDE 10 MG/ML
3 INJECTION, SOLUTION EPIDURAL; INFILTRATION; INTRACAUDAL; PERINEURAL ONCE
Status: COMPLETED | OUTPATIENT
Start: 2020-10-28 | End: 2020-10-28

## 2020-10-28 RX ORDER — ONDANSETRON 4 MG/1
4 TABLET, ORALLY DISINTEGRATING ORAL
Status: DISCONTINUED | OUTPATIENT
Start: 2020-10-28 | End: 2020-10-29 | Stop reason: HOSPADM

## 2020-10-28 RX ORDER — ONDANSETRON 2 MG/ML
INJECTION INTRAMUSCULAR; INTRAVENOUS AS NEEDED
Status: DISCONTINUED | OUTPATIENT
Start: 2020-10-28 | End: 2020-10-28 | Stop reason: HOSPADM

## 2020-10-28 RX ORDER — FACIAL-BODY WIPES
10 EACH TOPICAL DAILY PRN
Status: DISCONTINUED | OUTPATIENT
Start: 2020-10-28 | End: 2020-10-29 | Stop reason: HOSPADM

## 2020-10-28 RX ORDER — ROPIVACAINE HYDROCHLORIDE 2 MG/ML
30 INJECTION, SOLUTION EPIDURAL; INFILTRATION; PERINEURAL
Status: COMPLETED | OUTPATIENT
Start: 2020-10-28 | End: 2020-10-28

## 2020-10-28 RX ORDER — SODIUM CHLORIDE 0.9 % (FLUSH) 0.9 %
5-40 SYRINGE (ML) INJECTION EVERY 8 HOURS
Status: DISCONTINUED | OUTPATIENT
Start: 2020-10-28 | End: 2020-10-29 | Stop reason: HOSPADM

## 2020-10-28 RX ORDER — SUCCINYLCHOLINE CHLORIDE 20 MG/ML
INJECTION INTRAMUSCULAR; INTRAVENOUS AS NEEDED
Status: DISCONTINUED | OUTPATIENT
Start: 2020-10-28 | End: 2020-10-28 | Stop reason: HOSPADM

## 2020-10-28 RX ORDER — LOSARTAN POTASSIUM 50 MG/1
50 TABLET ORAL DAILY
Status: DISCONTINUED | OUTPATIENT
Start: 2020-10-29 | End: 2020-10-29 | Stop reason: HOSPADM

## 2020-10-28 RX ORDER — GLYCOPYRROLATE 0.2 MG/ML
INJECTION INTRAMUSCULAR; INTRAVENOUS AS NEEDED
Status: DISCONTINUED | OUTPATIENT
Start: 2020-10-28 | End: 2020-10-28 | Stop reason: HOSPADM

## 2020-10-28 RX ORDER — ALOGLIPTIN 6.25 MG/1
6.25 TABLET, FILM COATED ORAL DAILY
Status: DISCONTINUED | OUTPATIENT
Start: 2020-10-29 | End: 2020-10-29 | Stop reason: HOSPADM

## 2020-10-28 RX ORDER — HYDROMORPHONE HYDROCHLORIDE 1 MG/ML
1 INJECTION, SOLUTION INTRAMUSCULAR; INTRAVENOUS; SUBCUTANEOUS
Status: DISCONTINUED | OUTPATIENT
Start: 2020-10-28 | End: 2020-10-29 | Stop reason: HOSPADM

## 2020-10-28 RX ORDER — METFORMIN HYDROCHLORIDE 500 MG/1
500 TABLET ORAL 2 TIMES DAILY WITH MEALS
Status: DISCONTINUED | OUTPATIENT
Start: 2020-10-28 | End: 2020-10-29 | Stop reason: HOSPADM

## 2020-10-28 RX ORDER — ONDANSETRON 2 MG/ML
4 INJECTION INTRAMUSCULAR; INTRAVENOUS ONCE
Status: COMPLETED | OUTPATIENT
Start: 2020-10-28 | End: 2020-10-28

## 2020-10-28 RX ORDER — FAMOTIDINE 20 MG/1
20 TABLET, FILM COATED ORAL ONCE
Status: COMPLETED | OUTPATIENT
Start: 2020-10-28 | End: 2020-10-28

## 2020-10-28 RX ORDER — SODIUM CHLORIDE 0.9 % (FLUSH) 0.9 %
5-40 SYRINGE (ML) INJECTION EVERY 8 HOURS
Status: DISCONTINUED | OUTPATIENT
Start: 2020-10-28 | End: 2020-10-28

## 2020-10-28 RX ORDER — NEOSTIGMINE METHYLSULFATE 1 MG/ML
INJECTION, SOLUTION INTRAVENOUS AS NEEDED
Status: DISCONTINUED | OUTPATIENT
Start: 2020-10-28 | End: 2020-10-28 | Stop reason: HOSPADM

## 2020-10-28 RX ORDER — AMOXICILLIN 250 MG
1 CAPSULE ORAL 2 TIMES DAILY
Status: DISCONTINUED | OUTPATIENT
Start: 2020-10-28 | End: 2020-10-29 | Stop reason: HOSPADM

## 2020-10-28 RX ORDER — SODIUM CHLORIDE, SODIUM LACTATE, POTASSIUM CHLORIDE, CALCIUM CHLORIDE 600; 310; 30; 20 MG/100ML; MG/100ML; MG/100ML; MG/100ML
100 INJECTION, SOLUTION INTRAVENOUS CONTINUOUS
Status: DISCONTINUED | OUTPATIENT
Start: 2020-10-28 | End: 2020-10-29 | Stop reason: HOSPADM

## 2020-10-28 RX ORDER — INSULIN LISPRO 100 [IU]/ML
INJECTION, SOLUTION INTRAVENOUS; SUBCUTANEOUS ONCE
Status: DISCONTINUED | OUTPATIENT
Start: 2020-10-28 | End: 2020-10-28 | Stop reason: HOSPADM

## 2020-10-28 RX ORDER — LIDOCAINE HYDROCHLORIDE 20 MG/ML
INJECTION, SOLUTION EPIDURAL; INFILTRATION; INTRACAUDAL; PERINEURAL AS NEEDED
Status: DISCONTINUED | OUTPATIENT
Start: 2020-10-28 | End: 2020-10-28 | Stop reason: HOSPADM

## 2020-10-28 RX ORDER — CEFAZOLIN SODIUM 2 G/50ML
2 SOLUTION INTRAVENOUS EVERY 8 HOURS
Status: COMPLETED | OUTPATIENT
Start: 2020-10-28 | End: 2020-10-29

## 2020-10-28 RX ORDER — OXYCODONE HYDROCHLORIDE 5 MG/1
5-15 TABLET ORAL
Status: DISCONTINUED | OUTPATIENT
Start: 2020-10-28 | End: 2020-10-29 | Stop reason: HOSPADM

## 2020-10-28 RX ORDER — LORATADINE 10 MG/1
10 TABLET ORAL DAILY
Status: DISCONTINUED | OUTPATIENT
Start: 2020-10-29 | End: 2020-10-29 | Stop reason: HOSPADM

## 2020-10-28 RX ORDER — PHENYLEPHRINE HCL IN 0.9% NACL 1 MG/10 ML
SYRINGE (ML) INTRAVENOUS AS NEEDED
Status: DISCONTINUED | OUTPATIENT
Start: 2020-10-28 | End: 2020-10-28 | Stop reason: HOSPADM

## 2020-10-28 RX ORDER — MIDAZOLAM HYDROCHLORIDE 1 MG/ML
2 INJECTION, SOLUTION INTRAMUSCULAR; INTRAVENOUS ONCE
Status: COMPLETED | OUTPATIENT
Start: 2020-10-28 | End: 2020-10-28

## 2020-10-28 RX ORDER — DEXTROSE 50 % IN WATER (D50W) INTRAVENOUS SYRINGE
25-50 AS NEEDED
Status: DISCONTINUED | OUTPATIENT
Start: 2020-10-28 | End: 2020-10-28 | Stop reason: HOSPADM

## 2020-10-28 RX ORDER — AMLODIPINE BESYLATE 5 MG/1
5 TABLET ORAL DAILY
Status: DISCONTINUED | OUTPATIENT
Start: 2020-10-29 | End: 2020-10-29 | Stop reason: HOSPADM

## 2020-10-28 RX ADMIN — LIDOCAINE HYDROCHLORIDE 100 MG: 20 INJECTION, SOLUTION EPIDURAL; INFILTRATION; INTRACAUDAL; PERINEURAL at 07:42

## 2020-10-28 RX ADMIN — ONDANSETRON 4 MG: 2 INJECTION INTRAMUSCULAR; INTRAVENOUS at 08:27

## 2020-10-28 RX ADMIN — Medication 100 MCG: at 07:53

## 2020-10-28 RX ADMIN — CEFAZOLIN SODIUM 2 G: 2 SOLUTION INTRAVENOUS at 07:30

## 2020-10-28 RX ADMIN — CEFAZOLIN SODIUM 2 G: 2 SOLUTION INTRAVENOUS at 23:44

## 2020-10-28 RX ADMIN — ONDANSETRON 4 MG: 2 INJECTION INTRAMUSCULAR; INTRAVENOUS at 10:18

## 2020-10-28 RX ADMIN — TRANEXAMIC ACID 1 G: 1 INJECTION, SOLUTION INTRAVENOUS at 09:10

## 2020-10-28 RX ADMIN — ROCURONIUM BROMIDE 25 MG: 50 INJECTION INTRAVENOUS at 07:49

## 2020-10-28 RX ADMIN — SUCCINYLCHOLINE CHLORIDE 160 MG: 20 INJECTION, SOLUTION INTRAMUSCULAR; INTRAVENOUS at 07:42

## 2020-10-28 RX ADMIN — OXYCODONE HYDROCHLORIDE 10 MG: 5 TABLET ORAL at 20:39

## 2020-10-28 RX ADMIN — MIDAZOLAM 2 MG: 1 INJECTION INTRAMUSCULAR; INTRAVENOUS at 07:13

## 2020-10-28 RX ADMIN — GLYCOPYRROLATE 0.4 MG: 0.2 INJECTION INTRAMUSCULAR; INTRAVENOUS at 09:22

## 2020-10-28 RX ADMIN — PROPOFOL 200 MG: 10 INJECTION, EMULSION INTRAVENOUS at 07:42

## 2020-10-28 RX ADMIN — METOPROLOL TARTRATE 25 MG: 25 TABLET, FILM COATED ORAL at 17:44

## 2020-10-28 RX ADMIN — CEFAZOLIN SODIUM 2 G: 2 SOLUTION INTRAVENOUS at 16:56

## 2020-10-28 RX ADMIN — Medication 3 MG: at 09:22

## 2020-10-28 RX ADMIN — ROPIVACAINE HYDROCHLORIDE 60 MG: 2 INJECTION, SOLUTION EPIDURAL; INFILTRATION; PERINEURAL at 07:13

## 2020-10-28 RX ADMIN — FENTANYL CITRATE 50 MCG: 50 INJECTION, SOLUTION INTRAMUSCULAR; INTRAVENOUS at 07:42

## 2020-10-28 RX ADMIN — SODIUM CHLORIDE, SODIUM LACTATE, POTASSIUM CHLORIDE, AND CALCIUM CHLORIDE 75 ML/HR: 600; 310; 30; 20 INJECTION, SOLUTION INTRAVENOUS at 06:51

## 2020-10-28 RX ADMIN — LIDOCAINE HYDROCHLORIDE 2 ML: 10 INJECTION, SOLUTION EPIDURAL; INFILTRATION; INTRACAUDAL; PERINEURAL at 07:13

## 2020-10-28 RX ADMIN — TAMSULOSIN HYDROCHLORIDE 0.4 MG: 0.4 CAPSULE ORAL at 16:55

## 2020-10-28 RX ADMIN — OXYCODONE HYDROCHLORIDE 10 MG: 5 TABLET ORAL at 13:11

## 2020-10-28 RX ADMIN — SODIUM CHLORIDE, SODIUM LACTATE, POTASSIUM CHLORIDE, AND CALCIUM CHLORIDE 100 ML/HR: 600; 310; 30; 20 INJECTION, SOLUTION INTRAVENOUS at 13:12

## 2020-10-28 RX ADMIN — LIDOCAINE HYDROCHLORIDE 1 ML: 20 INJECTION, SOLUTION EPIDURAL; INFILTRATION; INTRACAUDAL; PERINEURAL at 07:10

## 2020-10-28 RX ADMIN — HYDROMORPHONE HYDROCHLORIDE 0.5 MG: 2 INJECTION INTRAMUSCULAR; INTRAVENOUS; SUBCUTANEOUS at 10:45

## 2020-10-28 RX ADMIN — FENTANYL CITRATE 50 MCG: 50 INJECTION, SOLUTION INTRAMUSCULAR; INTRAVENOUS at 08:11

## 2020-10-28 RX ADMIN — SODIUM CHLORIDE, SODIUM LACTATE, POTASSIUM CHLORIDE, AND CALCIUM CHLORIDE: 600; 310; 30; 20 INJECTION, SOLUTION INTRAVENOUS at 09:10

## 2020-10-28 RX ADMIN — TRANEXAMIC ACID 1 G: 1 INJECTION, SOLUTION INTRAVENOUS at 07:49

## 2020-10-28 RX ADMIN — DOCUSATE SODIUM 50MG AND SENNOSIDES 8.6MG 1 TABLET: 8.6; 5 TABLET, FILM COATED ORAL at 16:55

## 2020-10-28 RX ADMIN — DOCUSATE SODIUM 50MG AND SENNOSIDES 8.6MG 1 TABLET: 8.6; 5 TABLET, FILM COATED ORAL at 13:11

## 2020-10-28 RX ADMIN — ROCURONIUM BROMIDE 5 MG: 50 INJECTION INTRAVENOUS at 07:42

## 2020-10-28 RX ADMIN — HYDROMORPHONE HYDROCHLORIDE 0.5 MG: 2 INJECTION INTRAMUSCULAR; INTRAVENOUS; SUBCUTANEOUS at 10:15

## 2020-10-28 RX ADMIN — ROPIVACAINE HYDROCHLORIDE 40 MG: 2 INJECTION, SOLUTION EPIDURAL; INFILTRATION at 07:10

## 2020-10-28 RX ADMIN — HYDROMORPHONE HYDROCHLORIDE 0.5 MG: 2 INJECTION INTRAMUSCULAR; INTRAVENOUS; SUBCUTANEOUS at 10:35

## 2020-10-28 RX ADMIN — FENTANYL CITRATE 100 MCG: 50 INJECTION, SOLUTION INTRAMUSCULAR; INTRAVENOUS at 09:21

## 2020-10-28 RX ADMIN — METFORMIN HYDROCHLORIDE 500 MG: 500 TABLET ORAL at 16:56

## 2020-10-28 RX ADMIN — FENTANYL CITRATE 100 MCG: 50 INJECTION, SOLUTION INTRAMUSCULAR; INTRAVENOUS at 07:13

## 2020-10-28 RX ADMIN — FAMOTIDINE 20 MG: 20 TABLET ORAL at 06:51

## 2020-10-28 NOTE — ANESTHESIA POSTPROCEDURE EVALUATION
Procedure(s):  RIGHT TOTAL KNEE REPLACEMENT.     general, regional    Anesthesia Post Evaluation      Multimodal analgesia: multimodal analgesia used between 6 hours prior to anesthesia start to PACU discharge  Patient location during evaluation: bedside  Patient participation: complete - patient participated  Level of consciousness: awake  Pain score: 3  Pain management: adequate  Airway patency: patent  Anesthetic complications: no  Cardiovascular status: stable  Respiratory status: acceptable  Hydration status: acceptable  Post anesthesia nausea and vomiting:  controlled  Final Post Anesthesia Temperature Assessment:  Normothermia (36.0-37.5 degrees C)      INITIAL Post-op Vital signs:   Vitals Value Taken Time   /69 10/28/2020 11:14 AM   Temp 36.6 °C (97.9 °F) 10/28/2020 11:20 AM   Pulse 72 10/28/2020 11:20 AM   Resp 16 10/28/2020 11:20 AM   SpO2 97 % 10/28/2020 11:20 AM

## 2020-10-28 NOTE — ANESTHESIA PREPROCEDURE EVALUATION
Relevant Problems   No relevant active problems       Anesthetic History   No history of anesthetic complications            Review of Systems / Medical History  Patient summary reviewed and pertinent labs reviewed    Pulmonary        Sleep apnea: CPAP           Neuro/Psych   Within defined limits           Cardiovascular    Hypertension: well controlled        Dysrhythmias : atrial fibrillation      Exercise tolerance: >4 METS     GI/Hepatic/Renal                Endo/Other    Diabetes: well controlled, type 2    Morbid obesity and arthritis     Other Findings            Physical Exam    Airway  Mallampati: III  TM Distance: 4 - 6 cm  Neck ROM: normal range of motion   Mouth opening: Normal     Cardiovascular    Rhythm: irregular           Dental  No notable dental hx       Pulmonary  Breath sounds clear to auscultation               Abdominal  GI exam deferred       Other Findings            Anesthetic Plan    ASA: 3  Anesthesia type: general and regional - femoral single shot          Induction: Intravenous  Anesthetic plan and risks discussed with: Patient

## 2020-10-28 NOTE — INTERVAL H&P NOTE
Update History & Physical 
 
The Patient's History and Physical was reviewed with the patient and I examined the patient. There was no change. The surgical site was confirmed by the patient and me. Plan:  The risk, benefits, expected outcome, and alternative to the recommended procedure have been discussed with the patient. Patient understands and wants to proceed with the procedure.  
 
Electronically signed by Vish Prescott MD on 10/28/2020 at 7:30 AM 
 25-Apr-2017

## 2020-10-28 NOTE — OP NOTES
[address] [place] [address]   OPERATIVE REPORT    Name:  Guilherme Yadav  MR#:   [id number]  :  1950  ACCOUNT #:  [de-identified]  DATE OF SERVICE:  10/28/2020    PREOPERATIVE DIAGNOSIS:  Osteoarthritis of the right knee. POSTOPERATIVE DIAGNOSIS:  Osteoarthritis of the right knee. PROCEDURE PERFORMED:  Right total knee arthroplasty using the DePuy Attune posterior-stabilized cemented system. SURGEON:  Stephanie Delatorre MD    ASSISTANT:  Dayana Wallace. ANESTHESIA:  General.    COMPLICATIONS:  None. SPECIMENS REMOVED:  None. IMPLANTS:  Size 7 right Attune femoral posterior-stabilized cemented component, size 7 tibial base rotating platform, 41-mm patella medialized dome, size 7 x 10 mm tibial insert rotating platform posterior stabilized. ESTIMATED BLOOD LOSS:  100 mL. SUMMARY OF PROCEDURE:  After general anesthesia was induced, the patient's right knee was prepped and draped in the routine sterile fashion. The limb was exsanguinated by elevation and the upper thigh tourniquet inflated to 350 mmHg. A midline incision was made and a medial parapatellar arthrotomy performed. There was eburnated bone over the entire medial compartment with significant spurring. There were lateral erosions and patellofemoral erosions. Using the alignment guides, a distal femoral cut and proximal tibial cut were made and the femur and tibia sized. A trial reduction showed some medial tightness. This was relieved by anteromedial and proximal medial soft tissue elevation over the tibia. At this point, there was good motion and good stability. The patella was cut and sized and a trial showed full patellofemoral congruence. The components were then cemented in place with antibiotic-impregnated cement after which multiple tibial insert trials were made and the true tibial insert placed. At this point, there was full motion, full stability, and full patellofemoral congruence.   The tourniquet was then deflated and hemostasis achieved with electrocautery.   The wound was then closed in layers over a Hemovac drain      Jia Greene MD      [id number]  D:  10/28/2020 9:45  T:  10/28/2020 16:33  JOB #:  [id number]

## 2020-10-28 NOTE — PROGRESS NOTES
Orthopedic Coordinator Rounding Note    2020  Admit Date: 10/28/2020  Admit Diagnosis: Osteoarthritis of right knee, unspecified osteoarthritis type [M17.11]  Knee osteoarthritis [M17.10]  Procedure: Procedure(s):  RIGHT TOTAL KNEE REPLACEMENT  Post Op day: Day of Surgery    Vital Signs:    Blood pressure 112/74, pulse 68, temperature 97.8 °F (36.6 °C), resp. rate 16, height 5' 9\" (1.753 m), weight 101.2 kg (223 lb), SpO2 98 %. Temp (24hrs), Av °F (36.7 °C), Min:97.7 °F (36.5 °C), Max:98.4 °F (36.9 °C)    PAIN RELIEF: complete resolution of pain with current prescribed medications. I/O  10/28 0701 - 10/28 1900  In: 1000 [I.V.:1000]  Out: 90 [Drains:90]  No intake/output data recorded. Surgical Wound:  Wound Knee Left (Active)   Number of days: 279       Wound Knee Right (Active)   Dressing Status Clean, dry, and intact 10/28/20 1120   Dressing Type Elastic bandage 10/28/20 1120   Number of days: 0         PT/OT:   PATIENT MOBILITY    Bed Mobility  Supine to Sit: Stand-by assistance  Sit to Supine: Stand-by assistance  Scooting: Stand-by assistance  Transfers  Sit to Stand: Stand-by assistance  Stand to Sit: Stand-by assistance      Gait  Base of Support: Widened  Speed/Rosa: Pace decreased (<100 feet/min)  Step Length: Right shortened, Left shortened  Gait Abnormalities: Decreased step clearance, Trunk sway increased  Ambulation - Level of Assistance: Stand-by assistance  Distance (ft): 300 Feet (ft)  Assistive Device: Walker, rolling  Rail Use: Both  Stairs - Level of Assistance: Stand-by assistance  Number of Stairs Trained: 4        Patient is resting in the recliner with operative leg elevated. Discharge To: HOME  medications reviewed with the patient and appropriate educational materials and side effects teaching were provided along with surgery specific patient education guidebook. Reviewed activity orders and incentive spirometry with return demonstration.  Opportunity for questions and clarification provided.     Virgilio Bosworth, [place], [place], [address] [place] [address]  Orthopedic

## 2020-10-28 NOTE — PROGRESS NOTES
Problem: Mobility Impaired (Adult and Pediatric)  Goal: *Acute Goals and Plan of Care (Insert Text)  Description: Physical Therapy Goals  Initiated 10/28/2020 and to be accomplished within 7 day(s)  1. Patient will move from supine to sit and sit to supine  in bed with modified independence. 2.  Patient will transfer from bed to chair and chair to bed with modified independence using the least restrictive device. 3.  Patient will perform sit to stand with modified independence. 4.  Patient will ambulate with modified independence for 350 feet with the least restrictive device. 5.  Patient will ascend/descend 14 stairs with B handrail(s) with modified independence. PLOF: pt lives in a [facility] [place] with 14 MARGARITO, but has a stair lift so he can bypass the stairs. He lives with his wife and was independent with all mobility PTA. He recently had his left knee replaced back in January 2020 and had [facility] for that with success. He uses a SPC to ambulate but also has a RW     Outcome: Progressing Towards Goal     PHYSICAL THERAPY EVALUATION    Patient: Racquel Retana (76 y.o. male)  Date: 10/28/2020  Primary Diagnosis: Osteoarthritis of right knee, unspecified osteoarthritis type [M17.11]  Knee osteoarthritis [M17.10]  Procedure(s) (LRB):  RIGHT TOTAL KNEE REPLACEMENT (Right) Day of Surgery   Precautions:  WBAT(R TKA)  WBAT    ASSESSMENT :  Pt is 71 yo male admitted to hospital for right TKA and presents today POD 0 and agreeable to therapy. Patient was educated on weight bearing status, role of therapy, TE (see below), and equipment in room including role of SCDs, towel roll, and ice sleeve. He is familiar with all information as he had his left knee replaced in january Patient demonstrated independent SLR and transferred to sitting EOB for objective assessment with SBA. Patient was given demo with instruction on sit <> stand transfer and gait training.  Patient transferred to standing with SBA and RW and ambulated 300 feet in hallways SBA with RW. Pt also ascended/descended total of 4 stairs with B HR in a step-to pattern. Increased fatigue after stairs, pt cued to take deep breaths. At conclusion of session patient transferred to sitting in recliner and was left resting with call bell by the side, lunch in front of him, and towel roll under ankle. He reported dizziness at end of session. Patient instructed to call for assistance if they needed to get up for any reason and denied need for further assistance. Patient demonstrates decreased strength, mobility, and endurance. At this time, patient is safe for d/c home with [facility] and assist from wife. As he reported he would like to stay the night, patient will also be seen in the AM to further improve mobility and endurance. Pt not in need of any equipment. Patient will benefit from skilled intervention to address the above impairments. Patient's rehabilitation potential is considered to be Good  Factors which may influence rehabilitation potential include:   []         None noted  []         Mental ability/status  []         Medical condition  []         Home/family situation and support systems  [x]         Safety awareness  []         Pain tolerance/management  []         Other:      PLAN :  Recommendations and Planned Interventions:   [x]           Bed Mobility Training             [x]    Neuromuscular Re-Education  [x]           Transfer Training                   []    Orthotic/Prosthetic Training  [x]           Gait Training                          []    Modalities  [x]           Therapeutic Exercises           []    Edema Management/Control  [x]           Therapeutic Activities            [x]    Family Training/Education  [x]           Patient Education  []           Other (comment):    Frequency/Duration: Patient will be followed by physical therapy 1-2 times per day/4-7 days per week to address goals.   Discharge Recommendations: Home Health with assist from wife  Further Equipment Recommendations for Discharge: N/A     SUBJECTIVE:   Patient stated \"tomorrow I will be running, right? Rita Venegas    OBJECTIVE DATA SUMMARY:     Past Medical History:   Diagnosis Date    Arrhythmia     Atrial Fib    Arthritis     Degenerative disc disease, lumbar     Diabetes ([facility] [address]     HLD (hyperlipidemia)     Hypertension     Intracranial hemorrhage, cerebellar ([facility] [address] 2018    Hypertensive crisis, non-traumatic    PPD positive, treated 2018    was treated    Sleep apnea     on cpap    Spondylosis of lumbar spine      Past Surgical History:   Procedure Laterality Date    COLONOSCOPY  10/13/2015 Return 10/14/2025    Dr. Fernando Hunter; screening    HX COLONOSCOPY      HX KNEE ARTHROSCOPY Right 1986    right knee    HX KNEE REPLACEMENT Left 01/2020    HX OTHER SURGICAL      cyst removed from chin    HX WRIST FRACTURE TX Left     ORIF     Barriers to Learning/Limitations: None  Compensate with: N/A  Home Situation:  Home Situation  Home Environment: Private residence  # Steps to Enter: 14(has stair lift)  One/Two Story Residence: One story  # of Interior Steps: 14  Height of Each Step (in): 0 inches  Interior Rails: Left  Lift Chair Available: Yes  Living Alone: No  Support Systems: Spouse/Significant Other/Partner  Patient Expects to be Discharged to[de-identified] Private residence  Current DME Used/Available at Home: Walker, rolling, Cane, straight  Critical Behavior:  Neurologic State: Alert  Orientation Level: Oriented X4  Cognition: Follows commands  Safety/Judgement: Fall prevention  Psychosocial  Patient Behaviors: Calm; Cooperative  Purposeful Interaction: Yes  Pt Identified Daily Priority: Clinical issues (comment)  Caring Interventions: Reassure  Reassure:  Therapeutic listening       Strength:    Strength: Generally decreased, functional    Tone & Sensation:   Tone: Normal    Sensation: Intact    Range Of Motion:  AROM: Generally decreased, functional       PROM: Generally decreased, functional       Functional Mobility:  Bed Mobility:     Supine to Sit: Stand-by assistance  Sit to Supine: Stand-by assistance  Scooting: Stand-by assistance  Transfers:  Sit to Stand: Stand-by assistance  Stand to Sit: Stand-by assistance    Balance:   Sitting: Intact; Without support  Standing: Impaired; With support  Standing - Static: Good  Standing - Dynamic : Fair    Ambulation/Gait Training:  Distance (ft): 300 Feet (ft)  Assistive Device: Walker, rolling  Ambulation - Level of Assistance: Stand-by assistance        Gait Abnormalities: Decreased step clearance;Trunk sway increased        Base of Support: Widened     Speed/Rosa: Pace decreased (<100 feet/min)  Step Length: Right shortened;Left shortened    Stairs:  Number of Stairs Trained: 4  Stairs - Level of Assistance: Stand-by assistance  Rail Use: Both    Pain:  Pain level pre-treatment: 8/10   Pain level post-treatment: 8/10   Pain Intervention(s) : Medication (see MAR); Rest, Ice, Repositioning  Response to intervention: Nurse notified, See doc flow    Activity Tolerance:   Pt tolerated mobility well, dizziness at end of session  Please refer to the flowsheet for vital signs taken during this treatment. After treatment:   [x]         Patient left in no apparent distress sitting up in chair  []         Patient left in no apparent distress in bed  [x]         Call bell left within reach  [x]         Nursing notified  []         Caregiver present  []         Bed alarm activated  []         SCDs applied    COMMUNICATION/EDUCATION:   [x]         Role of Physical Therapy in the acute care setting. [x]         Fall prevention education was provided and the patient/caregiver indicated understanding. [x]         Patient/family have participated as able in goal setting and plan of care. []         Patient/family agree to work toward stated goals and plan of care. []         Patient understands intent and goals of therapy, but is neutral about his/her participation.   [] Patient is unable to participate in goal setting/plan of care: ongoing with therapy staff.  []         Other:     Thank you for this referral.  Rashaad Casper   Time Calculation: 27 mins      Eval Complexity: History: LOW Complexity : Zero comorbidities / personal factors that will impact the outcome / POCExam:LOW Complexity : 1-2 Standardized tests and measures addressing body structure, function, activity limitation and / or participation in recreation  Presentation: LOW Complexity : Stable, uncomplicated  Clinical Decision Making:Low Complexity    Overall Complexity:LOW

## 2020-10-28 NOTE — ROUTINE PROCESS
Pt ambulated in the hallway. Pt stable.   Report given to [facility] using the SBAR, Kardex, and [facility]

## 2020-10-28 NOTE — PERIOP NOTES
TRANSFER - OUT REPORT:    Verbal report given to Adenike(name) on Pebbles Lazar  being transferred to 2208(unit) for routine post - op       Report consisted of patients Situation, Background, Assessment and   Recommendations(SBAR). Information from the following report(s) SBAR, OR Summary and MAR was reviewed with the receiving nurse. Lines:   Peripheral IV 10/28/20 Left;Posterior Forearm (Active)   Site Assessment Clean, dry, & intact 10/28/20 1120   Phlebitis Assessment 0 10/28/20 1120   Infiltration Assessment 0 10/28/20 1120   Dressing Status Clean, dry, & intact 10/28/20 1120   Dressing Type Tape;Transparent 10/28/20 1120   Hub Color/Line Status Pink; Infusing 10/28/20 1120        Opportunity for questions and clarification was provided. Patient transported with:   Nurse and Nasal cannula oxygen at 3L's.

## 2020-10-29 ENCOUNTER — HOME HEALTH ADMISSION (OUTPATIENT)
Dept: HOME HEALTH SERVICES | Facility: HOME HEALTH | Age: 70
End: 2020-10-29
Payer: MEDICARE

## 2020-10-29 VITALS
TEMPERATURE: 98.6 F | HEIGHT: 69 IN | RESPIRATION RATE: 18 BRPM | WEIGHT: 223 LBS | BODY MASS INDEX: 33.03 KG/M2 | OXYGEN SATURATION: 93 % | DIASTOLIC BLOOD PRESSURE: 65 MMHG | SYSTOLIC BLOOD PRESSURE: 117 MMHG | HEART RATE: 88 BPM

## 2020-10-29 PROCEDURE — 97165 OT EVAL LOW COMPLEX 30 MIN: CPT

## 2020-10-29 PROCEDURE — 97116 GAIT TRAINING THERAPY: CPT

## 2020-10-29 PROCEDURE — 97110 THERAPEUTIC EXERCISES: CPT

## 2020-10-29 PROCEDURE — 2709999900 HC NON-CHARGEABLE SUPPLY

## 2020-10-29 PROCEDURE — 97535 SELF CARE MNGMENT TRAINING: CPT

## 2020-10-29 PROCEDURE — 74011250637 HC RX REV CODE- 250/637: Performed by: ORTHOPAEDIC SURGERY

## 2020-10-29 RX ADMIN — OXYCODONE HYDROCHLORIDE 10 MG: 5 TABLET ORAL at 08:42

## 2020-10-29 RX ADMIN — POLYETHYLENE GLYCOL 3350 17 G: 17 POWDER, FOR SOLUTION ORAL at 09:09

## 2020-10-29 RX ADMIN — ATORVASTATIN CALCIUM 10 MG: 10 TABLET, FILM COATED ORAL at 09:09

## 2020-10-29 RX ADMIN — LOSARTAN POTASSIUM 50 MG: 50 TABLET, FILM COATED ORAL at 09:09

## 2020-10-29 RX ADMIN — ASPIRIN 81 MG: 81 TABLET ORAL at 09:09

## 2020-10-29 RX ADMIN — METFORMIN HYDROCHLORIDE 500 MG: 500 TABLET ORAL at 09:09

## 2020-10-29 RX ADMIN — DOCUSATE SODIUM 50MG AND SENNOSIDES 8.6MG 1 TABLET: 8.6; 5 TABLET, FILM COATED ORAL at 09:09

## 2020-10-29 RX ADMIN — METOPROLOL TARTRATE 25 MG: 25 TABLET, FILM COATED ORAL at 09:09

## 2020-10-29 RX ADMIN — AMLODIPINE BESYLATE 5 MG: 5 TABLET ORAL at 09:09

## 2020-10-29 RX ADMIN — OXYCODONE HYDROCHLORIDE 10 MG: 5 TABLET ORAL at 03:58

## 2020-10-29 RX ADMIN — ALOGLIPTIN 6.25 MG: 6.25 TABLET, FILM COATED ORAL at 10:20

## 2020-10-29 RX ADMIN — TAMSULOSIN HYDROCHLORIDE 0.4 MG: 0.4 CAPSULE ORAL at 09:09

## 2020-10-29 RX ADMIN — LORATADINE 10 MG: 10 TABLET ORAL at 09:09

## 2020-10-29 NOTE — ROUTINE PROCESS
Pt given discharge instructions. Pt verbalized understanding.  IV d/cd no signs of infection noted. Each discharge page verified that it matched pts name. Pt d/cd to home. Pt stable.

## 2020-10-29 NOTE — PROGRESS NOTES
Medicare pt has received, reviewed, and signed 2nd IM letter informing them of their right to appeal the discharge. Signed copy has been placed on pt bedside chart. Discharge order noted for today. Pt has been accepted to [facility] agency. Met with patient  and are agreeable to the transition plan today. Transport has been arranged through his wife. Patient's discharge summary and home health  orders have been forwarded to [facility] home health  agency. Updated bedside RN, Emanuel Brewer,  to the transition plan.   Discharge information has been documented on the AVS.         CONNER BauerN RN  Care Management  Pager: [phone]

## 2020-10-29 NOTE — PROGRESS NOTES
Reason for Admission:  Osteoarthritis of right knee, unspecified osteoarthritis type [M17.11]  Knee osteoarthritis [M17.10]                 RUR Score:   10         Plan for utilizing home health:    yes                      Likelihood of Readmission:   LOW                         Transition of Care Plan:              Initial assessment completed with patient. Cognitive status of patient: oriented to time, place, person and situation. Face sheet information confirmed:  yes. The patient designates his wife Danielito Rice to participate in his discharge plan and to receive any needed information. This patient lives in a single family home with wife. Patient is able to navigate steps as needed. Per pt, he has a stair lift Prior to hospitalization, patient was considered to be independent with ADLs/IADLS : yes . Patient has a current ACP document on file: no  The wife will be available to transport patient home upon discharge. The patient already has Tmamy Lindy, Stair lift, Shower chair, and raised toilet seat medical equipment available in the home. Patient is not currently active with home health. Patient has not stayed in a skilled nursing facility or rehab. Was  stay within last 60 days : no. This patient is on dialysis :no    List of available Home Health agencies were provided and reviewed with the patient prior to discharge. Freedom of choice signed: yes, for [facility]. Currently, the discharge plan is Home with [address] [place] [address] Adan Kenny Gaviria. Home health orders sent to [facility] and Yamilex Warner was notified. The patient states that he can obtain his medications from the pharmacy, and take his medications as directed. Patient's current insurance is [employer] Medicare, [employer]       Care Management Interventions  PCP Verified by CM: Yes  Palliative Care Criteria Met (RRAT>21 & CHF Dx)?: No  Mode of Transport at Discharge:  Other (see comment)(family)  Transition of Care Consult (CM Consult): Chris Nelson [address] [address]: Yes  Physical Therapy Consult: Yes  Occupational Therapy Consult: Yes  Speech Therapy Consult: No  Current Support Network: Lives with Spouse  Confirm Follow Up Transport: Family  The Patient and/or Patient Representative was Provided with a Choice of Provider and Agrees with the Discharge Plan?: Yes  Freedom of Choice List was Provided with Basic Dialogue that Supports the Patient's Individualized Plan of Care/Goals, Treatment Preferences and Shares the Quality Data Associated with the Providers?: Yes  Discharge Location  Discharge Placement: Home with home health        ZEE Smith RN  Care Management  Pager: [phone]

## 2020-10-29 NOTE — PROGRESS NOTES
OT order received and chart reviewed. Patient seen for skilled OT evaluation and is safe for discharge home when medically stable/cleared by PT. Full note to follow.       Thank you for the referral.    Vella Siemens, MS, OTR/L

## 2020-10-29 NOTE — PROGRESS NOTES
OCCUPATIONAL THERAPY EVALUATION/DISCHARGE    Patient: Venkatesh Pérez (76 y.o. male)  Date: 10/29/2020  Primary Diagnosis: Osteoarthritis of right knee, unspecified osteoarthritis type [M17.11]  Knee osteoarthritis [M17.10]  Procedure(s) (LRB):  RIGHT TOTAL KNEE REPLACEMENT (Right) 1 Day Post-Op   Precautions:   WBAT(R TKA)  PLOF: Pt reports he lives with his wife. Pt was (I) with basic self-care/ADLs and functional mobility using a cane/RW PTA. Pt has a bathtub/shower with shower chair, grab bars, and a raised toilet seat. Pt had L TKA earlier this year (January 2020). ASSESSMENT AND RECOMMENDATIONS:  Upon entering room, pt seated in recliner, alert, and agreeable to therapy session. Based on the objective data described below, the patient presents he is motivated to return home. Pt Mod(I) with UB basic self-care/ADLs, requiring SBA for LB ADLs and supervision for toilet transfers with RW support. Pt presents BUE AROM and strength WFL. Pt presenting with min difficulty reaching towards R foot in sitting but was eager able to doff/don R sock without AE. However, pt issued on use of a reacher to assist with LB ADLs, including donning LB clothing and doffing socks nicole if limited by pain; pt demonstrated good understanding. Pt educated on LB dressing techniques; pt able to thread BLE through LB clothing in sitting and standing to don over hips. Pt performs  bathroom mobility for toilet transfers and grooming at sink level, supervision with RW support. Pt ambulated an additional ~3 mins down hallway in prep for IADLs, with no LOB noted. Will defer to PT for functional balance and mobility. Educated pt on fall prevention in hospital and home setting; pt demonstrated good understanding. Pt reports he has all AE/DME needed with a supportive wife to provide assist PRN. Pt does not require further skilled OT at this level of care and is safe for d/c pt home when medically stable.  At the end of the session, pt resting comfortably in recliner, call bell in reach, with all needs met. Discharge Recommendations: None  Further Equipment Recommendations for Discharge: N/A; Pt has all recommended equipment to safely return home.      SUBJECTIVE:   Patient stated i'm going to get my bathroom modified\"    OBJECTIVE DATA SUMMARY:     Past Medical History:   Diagnosis Date    Arrhythmia     Atrial Fib    Arthritis     Degenerative disc disease, lumbar     Diabetes ([facility] [address]     HLD (hyperlipidemia)     Hypertension     Intracranial hemorrhage, cerebellar ([facility] [address] 2018    Hypertensive crisis, non-traumatic    PPD positive, treated 2018    was treated    Sleep apnea     on cpap    Spondylosis of lumbar spine      Past Surgical History:   Procedure Laterality Date    COLONOSCOPY  10/13/2015 Return 10/14/2025    Dr. Matti Smith; screening    HX COLONOSCOPY      HX KNEE ARTHROSCOPY Right 1986    right knee    HX KNEE REPLACEMENT Left 01/2020    HX OTHER SURGICAL      cyst removed from chin    HX WRIST FRACTURE TX Left     ORIF     Barriers to Learning/Limitations: None  Compensate with: visual, verbal, tactile, kinesthetic cues/model    Home Situation:   Home Situation  Home Environment: Private residence  # Steps to Enter: 14(has stair lift)  One/Two Story Residence: One story  # of Interior Steps: 14  Height of Each Step (in): 0 inches  Interior Rails: Left  Lift Chair Available: Yes  Living Alone: No  Support Systems: Spouse/Significant Other/Partner  Patient Expects to be Discharged to[de-identified] Private residence  Current DME Used/Available at Home: Eun Buys, rolling, Cane, straight  Tub or Shower Type: Tub/Shower combination  []     Right hand dominant   []     Left hand dominant    Cognitive/Behavioral Status:  Neurologic State: Alert  Orientation Level: Oriented X4  Cognition: Follows commands  Safety/Judgement: Fall prevention    Skin: Visible skin appeared intact  Edema: None noted      Coordination: BUE  Coordination: Within functional limits  Fine Motor Skills-Upper: Left Intact; Right Intact    Gross Motor Skills-Upper: Left Intact; Right Intact    Balance:  Sitting: Intact  Standing: Impaired; With support  Standing - Static: Good  Standing - Dynamic : Fair(+)    Strength: BUE  Strength: Within functional limits    Tone & Sensation: BUE  Tone: Normal  Sensation: Intact    Range of Motion: BUE  AROM: Within functional limits      Functional Mobility and Transfers for ADLs:  Bed Mobility:  Pt sitting up in recliner upon arrival.  Transfers:  Sit to Stand: Supervision  Stand to Sit: Supervision   Toilet Transfer : Supervision   Bathroom Mobility: Supervision/set up    ADL Assessment:  Feeding: Modified independent    Oral Facial Hygiene/Grooming: Supervision(standing at sink level)    Bathing: Supervision    Upper Body Dressing: Modified independent    Lower Body Dressing: Stand-by assistance(Decreased standing balance; Decreased ability to reach R foot when sitting in chair)    Toileting: Supervision      ADL Intervention:  Feeding  Feeding Assistance: Modified independent  Food to Mouth: Modified independent    Grooming  Grooming Assistance: Supervision  Position Performed: Standing  Washing Hands: Supervision  Brushing Teeth: Supervision    Upper Body Bathing (using CHG wipes)  Bathing Assistance: Modified independent; Set-up  Position Performed: Seated edge of bed    Lower Body Bathing (using wipes)  Bathing Assistance: Set-up  Position Performed: Seated in chair    [address]: Modified independent  Pullover Shirt: Modified independent    Pt eager to doff/don R sock without AE, however presented with min difficulty and increased time to complete task. Pt issued and educated on use of a reacher to doff socks/don LB clothing, nicole during days when limited by pain; pt acknowledged understanding with pt appreciative of AE.  Pt declined the need for a sock aid, long handled sponge, and long handled shoe horn at this time. Lower Body Dressing Assistance  Dressing Assistance: Stand-by assistance  Socks: Stand-by assistance  LB undergarment and pants with elastic waist: Stand-by assist  Position Performed: Seated in chair    Toileting/toilet transfers: Supervision with  support for transfers. No LOB noted. Cognitive Retraining  Safety/Judgement: Fall prevention      Pain:  Pain level pre-treatment: 5/10 (R knee)  Pain level post-treatment: 5/10   Pain Intervention(s): Medication (see MAR); Rest, Ice, Repositioning  Response to intervention: Nurse notified, See doc flow    Activity Tolerance:   Good    Please refer to the flowsheet for vital signs taken during this treatment. After treatment:   [x]  Patient left in no apparent distress sitting up in chair  []  Patient left in no apparent distress in bed  [x]  Call bell left within reach  [x]  Nursing notified  []  Caregiver present  []  Bed alarm activated    COMMUNICATION/EDUCATION:   [x]      Role of Occupational Therapy in the acute care setting  [x]      Home safety education was provided and the patient/caregiver indicated understanding. [x]      Patient/family have participated as able and agree with findings and recommendations. []      Patient is unable to participate in plan of care at this time. Thank you for this referral.  Aylin Cooney, MS, OTR/L  Time Calculation: 43 mins      Eval Complexity: History: MEDIUM Complexity : Expanded review of history including physical, cognitive and psychosocial  history ; Examination: LOW Complexity : 1-3 performance deficits relating to physical, cognitive , or psychosocial skils that result in activity limitations and / or participation restrictions ;    Decision Making:LOW Complexity : No comorbidities that affect functional and no verbal or physical assistance needed to complete eval tasks

## 2020-10-29 NOTE — ROUTINE PROCESS
End of Shift Note Bedside and verbal shift change report given to Sherren Read, RN (On coming nurse) by Denise Velásquez RN (Off going nurse). Report included the following information:  
   --Procedure Summary 
   --MAR, 
   --Recent Results --Med Rec Status SBAR Recommendations: D/C Issues for Provider to address D/C Activity This Shift 
 
 [] Bed Rest Order 
 [] Refused 
 [] Dangled  
 [] TDWB Ambulating: 
   [] Bathroom [] BSC [x] Room/Hallway Up in Chair for meals []Yes [] No  
Voiding       [x] Yes  [] No 
Ramos          [] Yes  [] No 
Incontinent [] Yes  [] No 
 
DUE TO VOID POUR        [] Yes [] No 
Purewick    [] Yes [] No 
New Onset [] Yes [] No Straight Cath   []Yes  [] No 
Condom Cath  [] Yes [] No 
MD Called      [] Yes  [] No  
Blood Sugars Managed []Yes [x] No   
Bowels Moved [] Yes [x] No 
 
Incontinent     [] Yes [] No Passed Gas []Yes [x] No 
 
New Onset  []Yes [] No 
  
 
 MD Called []Yes  [] No 
  
CHG Bath Done Before Surgery After Surgery  
  
[] Yes  [x] No 
[] Yes  [x] No   
  
Drain Removed [x] Yes  [] No [] N/A Dressing Changed [] Yes   [x] No [] N/A Nausea/Vomiting [] Yes   [x] No    
Ice Packs Changed [x] Yes   [] No  [] N/A Incentive Spirometer  [x] Yes  [] No     
SCD Pumps On Ankle Pumping  [x] Yes   [] No  
  
[x] Yes   [] No    
  
Telemetry Monitoring [] Yes   [x] No   Rhythm

## 2020-10-29 NOTE — PROGRESS NOTES
Problem: Mobility Impaired (Adult and Pediatric)  Goal: *Acute Goals and Plan of Care (Insert Text)  Description: Physical Therapy Goals  Initiated 10/28/2020 and to be accomplished within 7 day(s)  1. Patient will move from supine to sit and sit to supine  in bed with modified independence. 2.  Patient will transfer from bed to chair and chair to bed with modified independence using the least restrictive device. 3.  Patient will perform sit to stand with modified independence. 4.  Patient will ambulate with modified independence for 350 feet with the least restrictive device. 5.  Patient will ascend/descend 14 stairs with B handrail(s) with modified independence. PLOF: pt lives in a [facility] [place] with 14 MARGARITO, but has a stair lift so he can bypass the stairs. He lives with his wife and was independent with all mobility PTA. He recently had his left knee replaced back in January 2020 and had [facility] for that with success. He uses a SPC to ambulate but also has a RW     Outcome: Resolved/Met     PHYSICAL THERAPY TREATMENT AND DISCHARGE    Patient: Dario Conn (76 y.o. male)  Date: 10/29/2020  Diagnosis: Osteoarthritis of right knee, unspecified osteoarthritis type [M17.11]  Knee osteoarthritis [M17.10]   <principal problem not specified>  Procedure(s) (LRB):  RIGHT TOTAL KNEE REPLACEMENT (Right) 1 Day Post-Op  Precautions: WBAT(R TKA)    ASSESSMENT:  Based on the objective data described below, the patient presents with pain and decreased strength. Pt found sitting up in recliner, requesting pain meds prior to ambulating. RN notified, pt performed exercises sitting in chair while awaiting meds. Once given, pt up to standing with RW and SBA. Pt states his pain is increased today but it is better than when he had his other knee done. Pt ambulated 300 feet in hallways and ascended/descended 4 stairs B HR with step-to pattern and SBA. No LOB/instability noted and no nausea/dizziness.  Pt back sitting up in recliner with B LE's elevated, call bell nearby, no new questions or concerns. RN made aware of patients performance. Pt is safe for d/c home with [facility] at this time and assist from wife if needed. Pt has all needed equipment. PLAN:  Maximum therapeutic gains met at current level of care and patient will be discharged from physical therapy at this time. Rationale for discharge:  [x]     Goals Achieved  []     [address]  []     Patient not participating in therapy  []     Other:  Discharge Recommendations:  Home Health  Further Equipment Recommendations for Discharge:  N/A     SUBJECTIVE:   Patient stated let's get it over with so I can go home.     OBJECTIVE DATA SUMMARY:   Critical Behavior:  Neurologic State: Alert  Orientation Level: Oriented X4  Cognition: Follows commands  Safety/Judgement: Fall prevention  Functional Mobility Training:  Bed Mobility:     Supine to Sit: Stand-by assistance  Sit to Supine: Stand-by assistance  Scooting: Stand-by assistance         Transfers:  Sit to Stand: Supervision  Stand to Sit: Supervision    Balance:  Sitting: Intact  Standing: Impaired; With support  Standing - Static: Good  Standing - Dynamic : Fair(+)   Range Of Motion:   AROM: Within functional limits   Ambulation/Gait Training:  Distance (ft): 300 Feet (ft)  Assistive Device: Walker, rolling  Ambulation - Level of Assistance: Stand-by assistance        Gait Abnormalities: Decreased step clearance        Base of Support: Widened     Speed/Rosa: Pace decreased (<100 feet/min)  Step Length: Right shortened;Left shortened  Stairs:  Number of Stairs Trained: 4  Stairs - Level of Assistance: Stand-by assistance  Rail Use: Both    Therapeutic Exercises:   Pt performed exercises per flow sheet sitting up in chair      EXERCISE   Sets   Reps   Active Active Assist   Passive Self ROM   Comments   Ankle Pumps    [] [] [] []    Quad Sets/Glut Sets    [] [] [] [] Hold for 5 secs   Hamstring Sets   [] [] [] []    Short Arc Quads   [] [] [] []    Heel Slides   [] [] [] []    Straight Leg Raises   [] [] [] []    Hip Add   [] [] [] [] Hold for 5 secs, w/ pillow squeeze   Long Arc Quads 1 15 [x] [] [] []    Seated Marching 1 20 [x] [] [] []    Standing Marching   [] [] [] []       [] [] [] []        Pain:  Pain level pre-treatment: 8/10   Pain level post-treatment: 8/10   Pain Intervention(s): Medication (see MAR); Rest, Repositioning   Response to intervention: Nurse notified, See doc flow    Activity Tolerance:   Pt tolerated mobility well  Please refer to the flowsheet for vital signs taken during this treatment. After treatment:   [x] Patient left in no apparent distress sitting up in chair  [] Patient left in no apparent distress in bed  [x] Call bell left within reach  [x] Nursing notified  [] Caregiver present  [] Bed alarm activated  [] SCDs applied      COMMUNICATION/EDUCATION:   [x]         Role of Physical Therapy in the acute care setting. [x]         Fall prevention education was provided and the patient/caregiver indicated understanding. [x]         Patient/family have participated as able and agree with findings and recommendations. []         Patient is unable to participate in plan of care at this time.   []         Other:        Adolfo Stevens   Time Calculation: 23 mins

## 2020-10-29 NOTE — DISCHARGE INSTRUCTIONS
DISCHARGE SUMMARY from Nurse    PATIENT INSTRUCTIONS:    After general anesthesia or intravenous sedation, for 24 hours or while taking prescription Narcotics:  · Limit your activities  · Do not drive and operate hazardous machinery  · Do not make important personal or business decisions  · Do  not drink alcoholic beverages  · If you have not urinated within 8 hours after discharge, please contact your surgeon on call. Report the following to your surgeon:  · Excessive pain, swelling, redness or odor of or around the surgical area  · Temperature over 100.5  · Nausea and vomiting lasting longer than 4 hours or if unable to take medications  · Any signs of decreased circulation or nerve impairment to extremity: change in color, persistent  numbness, tingling, coldness or increase pain  · Any questions    What to do at Home:  Recommended activity: Activity as tolerated,     If you experience any of the following symptoms  Fever, chills, shortness of breath, please follow up with md.    *  Please give a list of your current medications to your Primary Care Provider. *  Please update this list whenever your medications are discontinued, doses are      changed, or new medications (including over-the-counter products) are added. *  Please carry medication information at all times in case of emergency situations. These are general instructions for a healthy lifestyle:    No smoking/ No tobacco products/ Avoid exposure to second hand smoke  Surgeon General's Warning:  Quitting smoking now greatly reduces serious risk to your health.     Obesity, smoking, and sedentary lifestyle greatly increases your risk for illness    A healthy diet, regular physical exercise & weight monitoring are important for maintaining a healthy lifestyle    You may be retaining fluid if you have a history of heart failure or if you experience any of the following symptoms:  Weight gain of 3 pounds or more overnight or 5 pounds in a week, increased swelling in our hands or feet or shortness of breath while lying flat in bed. Please call your doctor as soon as you notice any of these symptoms; do not wait until your next office visit. The discharge information has been reviewed with the patient. The patient verbalized understanding. Discharge medications reviewed with the {Rigo meds reviewed OLYB:[id number]} and appropriate educational materials and side effects teaching were provided.   ___________________________________________________________________________________________________________________________________

## 2020-10-29 NOTE — HOME CARE
Discharge noted for today. Received home health referral for [facility] for SN and PT - [place] protocol. Spoke with patient, explained services and answered all questions. Demographics verified. Referral processed and arranged through central office. Patient has the following DME: rolling walker, cane, raised toilet seat, grab bars, and reacher.  [facility] Liaison

## 2020-10-29 NOTE — PROGRESS NOTES
conducted an initial consultation and Spiritual Assessment for Hope Ordonez, who is a 72 y.o.,male. Patient's Primary Language is: [place]. According to the patient's EMR Shinto Affiliation is: Adventism. The reason the Patient came to the hospital is:   Patient Active Problem List    Diagnosis Date Noted    Knee osteoarthritis 01/23/2020    Obstructive sleep apnea 06/30/2019    Hallux rigidus of both feet 01/28/2019    Hammer toes of both feet 01/28/2019    Foot callus 01/28/2019    Essential hypertension 02/21/2018    Type 2 diabetes mellitus without complication, without long-term current use of insulin ([facility] [address] 07/12/2017    Advance directive discussed with patient 02/03/2016        The  provided the following Interventions:  Initiated a relationship of care and support. Explored issues of sophy, belief, spirituality and Taoism/ritual needs while hospitalized. Listened empathically. Provided chaplaincy education. Provided information about Spiritual Care Services. Offered prayer and assurance of continued prayers on patient's behalf. Chart reviewed. The following outcomes where achieved:   confirmed Patient's Shinto Affiliation. Patient processed feeling about current hospitalization. Patient expressed gratitude for 's visit. Assessment:  Patient does not have any Taoism/cultural needs that will affect patient's preferences in health care. There are no spiritual or Taoism issues which require intervention at this time. Plan:  Chaplains will continue to follow and will provide pastoral care on an as needed/requested basis.  recommends bedside caregivers page  on duty if patient shows signs of acute spiritual or emotional distress.     [facility]   [phone]

## 2020-10-30 ENCOUNTER — HOME CARE VISIT (OUTPATIENT)
Dept: HOME HEALTH SERVICES | Facility: HOME HEALTH | Age: 70
End: 2020-10-30

## 2020-10-30 ENCOUNTER — HOME CARE VISIT (OUTPATIENT)
Dept: SCHEDULING | Facility: HOME HEALTH | Age: 70
End: 2020-10-30
Payer: MEDICARE

## 2020-10-30 ENCOUNTER — PATIENT OUTREACH (OUTPATIENT)
Dept: CASE MANAGEMENT | Age: 70
End: 2020-10-30

## 2020-10-30 VITALS
HEART RATE: 87 BPM | TEMPERATURE: 97.3 F | SYSTOLIC BLOOD PRESSURE: 112 MMHG | DIASTOLIC BLOOD PRESSURE: 68 MMHG | RESPIRATION RATE: 17 BRPM | OXYGEN SATURATION: 97 %

## 2020-10-30 VITALS
TEMPERATURE: 97.5 F | RESPIRATION RATE: 16 BRPM | DIASTOLIC BLOOD PRESSURE: 80 MMHG | SYSTOLIC BLOOD PRESSURE: 110 MMHG | HEART RATE: 71 BPM | OXYGEN SATURATION: 97 %

## 2020-10-30 PROCEDURE — A6255 ABSORPT DRG >16<=48 IN W/BDR: HCPCS

## 2020-10-30 PROCEDURE — G0299 HHS/HOSPICE OF RN EA 15 MIN: HCPCS

## 2020-10-30 PROCEDURE — G0151 HHCP-SERV OF PT,EA 15 MIN: HCPCS

## 2020-10-30 PROCEDURE — 3331090002 HH PPS REVENUE DEBIT

## 2020-10-30 PROCEDURE — A6212 FOAM DRG <=16 SQ IN W/BORDER: HCPCS

## 2020-10-30 PROCEDURE — A6216 NON-STERILE GAUZE<=16 SQ IN: HCPCS

## 2020-10-30 PROCEDURE — A4216 STERILE WATER/SALINE, 10 ML: HCPCS

## 2020-10-30 PROCEDURE — 400013 HH SOC

## 2020-10-30 PROCEDURE — 3331090001 HH PPS REVENUE CREDIT

## 2020-10-30 NOTE — PROGRESS NOTES
Patient was admitted to [facility] on 10- and discharged on 10/29/2020  For:     PREOPERATIVE DIAGNOSIS:  Osteoarthritis of the right knee.     POSTOPERATIVE DIAGNOSIS:  Osteoarthritis of the right knee.     PROCEDURE PERFORMED:  Right total knee arthroplasty using the DePuy Attune posterior-stabilized cemented system.      Per Operative Report of 10- by Dr. Sotero Salvador. Outreach made within 2 business days of discharge: Yes        Top Discharge Challenges to be reviewed by the provider   Additional needs identified to be addressed with provider no      COVID-19 related testing which was available at this time. Test results were negative. Patient informed of results, if available? no as lab work appears to have been completed prior to elective surgery. Method of communication with provider : none, PCP does not appear to be a [facility] Provider. Advance Care Planning:   Does patient have an Advance Directive:  not noted to be on file at this time    Inpatient Readmission Risk score: 3%     Was this a readmission? no   Patient stated reason for the admission:   Patients top risk factors for readmission: medical condition  Interventions to address risk factors: see goals please     Care Transition Nurse (CTN) contacted the patient by telephone to perform post hospital discharge assessment. Verified name and  with patient as identifiers. Provided introduction to self, and explanation of the CTN role. CTN reviewed discharge instructions, medical action plan and red flags with patient who verbalized understanding. Patient given an opportunity to ask questions and does not have any further questions or concerns at this time. The patient agrees to contact the PCP office for questions related to their healthcare. Medication review was discussed with patient. Patient elected to review medications with home health staff when they visit today.    Per chart review, no changes made to medications. Referral to Pharm D needed: no      Home Health/Outpatient orders at discharge:  Yes   [address]: [facility]   Date of initial visit: scheduled for 10/30/2020    Durable Medical Equipment ordered at discharge: none noted at this time   [address] received: n/a     Covid Risk Education    Covid risk education could not be completed at this time as the patient kept the conversation short. Discussed follow-up appointments. If no appointment was previously scheduled, appointment scheduling offered: n/a as PCP does not appear to be a [facility] Provider. Orthopedic Surgical Follow up appointment scheduled for 11/11/2020. [address] follow up appointment(s):   Future Appointments   Date Time Provider Leanne Carson   11/17/2020  2:15 PM Emanuel Parikh MD [address] AMB     Non-[facility] follow up appointment(s):   PCP   Orthopedic Surgeon 11-    Plan for follow-up call in 10-14 days/as needed  based on severity of symptoms and risk factors. CTN provided contact information for future needs. Goals Addressed                 This Visit's Progress     Attends follow-up appointments as directed. Target Date:  12-1-2020    10-  Patient to follow up as follows:       - Follow up with PCP  - Follow up with orthopedic surgeon 11-         Prevent complications post hospitalization. Target Date:  12-1-2020    10/30/2020   Patient and/or family members were alerted to the availability of physician or other advanced practioners after hours, weekends, and holidays. Please call the PCP office phone number and speak with the answering service for assistance. Please call 911 for emergency assistance as needed. Care Transitions Nurse ( CTN)  contact information provided for follow up as needed.          Supportive resources in place to maintain patient in the community (ie. Home Health, DME equipment, refer to, medication assistant plan, etc.)        Target Date:  11-1-2020    10/30/2020   [facility] staff to initiate home visiting skilled visits to include nursing and PT.         Understands red flags post discharge. Target Date: 12-1-2020    10/30/2020   Care Transitions Nurse (CTN) reviewed red flags with patient as follows:   Please call 911 for immediate assistance for symptoms such as:   - Chest Pain  - Severe shortness of breath   - Change in mental status   - Blue tint to face or lips   - New or worsening symptoms          Patient reports:   - he is hurting   - he has pain medication and that helps temporarily. - home health is to visit today. CTN encouraged patient to follow up with home health staff to let them know how he is feeling. Please call physician if pain increases or pain medication is not effective with reducing pain.

## 2020-10-31 ENCOUNTER — HOME CARE VISIT (OUTPATIENT)
Dept: SCHEDULING | Facility: HOME HEALTH | Age: 70
End: 2020-10-31
Payer: MEDICARE

## 2020-10-31 PROCEDURE — G0157 HHC PT ASSISTANT EA 15: HCPCS

## 2020-10-31 PROCEDURE — 3331090001 HH PPS REVENUE CREDIT

## 2020-10-31 PROCEDURE — 3331090002 HH PPS REVENUE DEBIT

## 2020-11-01 ENCOUNTER — HOME CARE VISIT (OUTPATIENT)
Dept: SCHEDULING | Facility: HOME HEALTH | Age: 70
End: 2020-11-01
Payer: MEDICARE

## 2020-11-01 PROCEDURE — G0299 HHS/HOSPICE OF RN EA 15 MIN: HCPCS

## 2020-11-01 PROCEDURE — 3331090001 HH PPS REVENUE CREDIT

## 2020-11-01 PROCEDURE — G0157 HHC PT ASSISTANT EA 15: HCPCS

## 2020-11-01 PROCEDURE — 3331090002 HH PPS REVENUE DEBIT

## 2020-11-02 ENCOUNTER — HOME CARE VISIT (OUTPATIENT)
Dept: SCHEDULING | Facility: HOME HEALTH | Age: 70
End: 2020-11-02
Payer: MEDICARE

## 2020-11-02 ENCOUNTER — HOME CARE VISIT (OUTPATIENT)
Dept: HOME HEALTH SERVICES | Facility: HOME HEALTH | Age: 70
End: 2020-11-02
Payer: MEDICARE

## 2020-11-02 VITALS
HEART RATE: 66 BPM | RESPIRATION RATE: 16 BRPM | DIASTOLIC BLOOD PRESSURE: 66 MMHG | OXYGEN SATURATION: 98 % | SYSTOLIC BLOOD PRESSURE: 123 MMHG | TEMPERATURE: 98.6 F

## 2020-11-02 VITALS
TEMPERATURE: 99.3 F | HEART RATE: 85 BPM | OXYGEN SATURATION: 95 % | RESPIRATION RATE: 13 BRPM | SYSTOLIC BLOOD PRESSURE: 108 MMHG | OXYGEN SATURATION: 97 % | DIASTOLIC BLOOD PRESSURE: 72 MMHG | TEMPERATURE: 98.4 F | DIASTOLIC BLOOD PRESSURE: 68 MMHG | SYSTOLIC BLOOD PRESSURE: 116 MMHG | RESPIRATION RATE: 13 BRPM | HEART RATE: 69 BPM

## 2020-11-02 PROCEDURE — 3331090001 HH PPS REVENUE CREDIT

## 2020-11-02 PROCEDURE — 3331090002 HH PPS REVENUE DEBIT

## 2020-11-02 PROCEDURE — G0157 HHC PT ASSISTANT EA 15: HCPCS

## 2020-11-02 PROCEDURE — G0152 HHCP-SERV OF OT,EA 15 MIN: HCPCS

## 2020-11-03 ENCOUNTER — HOME CARE VISIT (OUTPATIENT)
Dept: SCHEDULING | Facility: HOME HEALTH | Age: 70
End: 2020-11-03
Payer: MEDICARE

## 2020-11-03 VITALS
DIASTOLIC BLOOD PRESSURE: 78 MMHG | TEMPERATURE: 98 F | RESPIRATION RATE: 20 BRPM | HEART RATE: 87 BPM | SYSTOLIC BLOOD PRESSURE: 130 MMHG | OXYGEN SATURATION: 98 %

## 2020-11-03 VITALS
OXYGEN SATURATION: 97 % | SYSTOLIC BLOOD PRESSURE: 118 MMHG | RESPIRATION RATE: 16 BRPM | HEART RATE: 96 BPM | DIASTOLIC BLOOD PRESSURE: 70 MMHG | TEMPERATURE: 98.1 F

## 2020-11-03 VITALS
HEART RATE: 100 BPM | OXYGEN SATURATION: 96 % | DIASTOLIC BLOOD PRESSURE: 78 MMHG | SYSTOLIC BLOOD PRESSURE: 136 MMHG | TEMPERATURE: 97.9 F

## 2020-11-03 PROCEDURE — G0157 HHC PT ASSISTANT EA 15: HCPCS

## 2020-11-03 PROCEDURE — 3331090001 HH PPS REVENUE CREDIT

## 2020-11-03 PROCEDURE — G0299 HHS/HOSPICE OF RN EA 15 MIN: HCPCS

## 2020-11-03 PROCEDURE — 3331090002 HH PPS REVENUE DEBIT

## 2020-11-04 ENCOUNTER — HOME CARE VISIT (OUTPATIENT)
Dept: SCHEDULING | Facility: HOME HEALTH | Age: 70
End: 2020-11-04
Payer: MEDICARE

## 2020-11-04 VITALS
RESPIRATION RATE: 16 BRPM | TEMPERATURE: 99.7 F | OXYGEN SATURATION: 98 % | HEART RATE: 94 BPM | SYSTOLIC BLOOD PRESSURE: 125 MMHG | DIASTOLIC BLOOD PRESSURE: 76 MMHG

## 2020-11-04 PROCEDURE — 3331090001 HH PPS REVENUE CREDIT

## 2020-11-04 PROCEDURE — G0157 HHC PT ASSISTANT EA 15: HCPCS

## 2020-11-04 PROCEDURE — 3331090002 HH PPS REVENUE DEBIT

## 2020-11-05 ENCOUNTER — TELEPHONE (OUTPATIENT)
Dept: CARDIOLOGY CLINIC | Age: 70
End: 2020-11-05

## 2020-11-05 ENCOUNTER — HOME CARE VISIT (OUTPATIENT)
Dept: SCHEDULING | Facility: HOME HEALTH | Age: 70
End: 2020-11-05
Payer: MEDICARE

## 2020-11-05 VITALS
DIASTOLIC BLOOD PRESSURE: 62 MMHG | TEMPERATURE: 98.6 F | HEART RATE: 92 BPM | SYSTOLIC BLOOD PRESSURE: 105 MMHG | RESPIRATION RATE: 16 BRPM | OXYGEN SATURATION: 97 %

## 2020-11-05 PROCEDURE — G0157 HHC PT ASSISTANT EA 15: HCPCS

## 2020-11-05 PROCEDURE — 3331090001 HH PPS REVENUE CREDIT

## 2020-11-05 PROCEDURE — 3331090002 HH PPS REVENUE DEBIT

## 2020-11-05 NOTE — TELEPHONE ENCOUNTER
Michelle Dyer with home health calls to state that patient has been having \"low BP\" at last 2 therapy visits. Brad Coni reports BP 99/67 and 98/64 after exercise today with no symptoms/dizziness. Patient is currently taking Norco for pain management after knee replacement surgery. Brad Newberry advised to continue to monitor patient's BP and call the office if it occurs again tomorrow. The above to be reviewed with Dr. Krystyna Rodriguez. This has been fully explained to Brad Newberry, who indicates understanding.

## 2020-11-06 ENCOUNTER — HOME CARE VISIT (OUTPATIENT)
Dept: SCHEDULING | Facility: HOME HEALTH | Age: 70
End: 2020-11-06
Payer: MEDICARE

## 2020-11-06 VITALS
RESPIRATION RATE: 16 BRPM | HEART RATE: 90 BPM | TEMPERATURE: 99 F | OXYGEN SATURATION: 96 % | SYSTOLIC BLOOD PRESSURE: 98 MMHG | DIASTOLIC BLOOD PRESSURE: 64 MMHG

## 2020-11-06 VITALS
TEMPERATURE: 98.6 F | OXYGEN SATURATION: 98 % | RESPIRATION RATE: 16 BRPM | HEART RATE: 85 BPM | DIASTOLIC BLOOD PRESSURE: 67 MMHG | SYSTOLIC BLOOD PRESSURE: 94 MMHG

## 2020-11-06 PROCEDURE — G0151 HHCP-SERV OF PT,EA 15 MIN: HCPCS

## 2020-11-06 PROCEDURE — 3331090001 HH PPS REVENUE CREDIT

## 2020-11-06 PROCEDURE — 3331090002 HH PPS REVENUE DEBIT

## 2020-11-06 NOTE — TELEPHONE ENCOUNTER
Reviewed with Dr. Lars Short. Verbal order and read back per Iman Reis MD  Decrease Cozaar to 25 mg daily. Clarita Cao PT  to make aware of changes. Placed call to spouse to make aware. This has been fully explained to the spouse, who indicates understanding.

## 2020-11-07 ENCOUNTER — HOME CARE VISIT (OUTPATIENT)
Dept: SCHEDULING | Facility: HOME HEALTH | Age: 70
End: 2020-11-07
Payer: MEDICARE

## 2020-11-07 VITALS
SYSTOLIC BLOOD PRESSURE: 102 MMHG | DIASTOLIC BLOOD PRESSURE: 74 MMHG | TEMPERATURE: 98.3 F | RESPIRATION RATE: 13 BRPM | HEART RATE: 53 BPM | OXYGEN SATURATION: 97 %

## 2020-11-07 PROCEDURE — G0157 HHC PT ASSISTANT EA 15: HCPCS

## 2020-11-07 PROCEDURE — 3331090001 HH PPS REVENUE CREDIT

## 2020-11-07 PROCEDURE — 3331090002 HH PPS REVENUE DEBIT

## 2020-11-08 ENCOUNTER — HOME CARE VISIT (OUTPATIENT)
Dept: SCHEDULING | Facility: HOME HEALTH | Age: 70
End: 2020-11-08
Payer: MEDICARE

## 2020-11-08 PROCEDURE — G0157 HHC PT ASSISTANT EA 15: HCPCS

## 2020-11-08 PROCEDURE — 3331090001 HH PPS REVENUE CREDIT

## 2020-11-08 PROCEDURE — 3331090002 HH PPS REVENUE DEBIT

## 2020-11-09 ENCOUNTER — HOME CARE VISIT (OUTPATIENT)
Dept: SCHEDULING | Facility: HOME HEALTH | Age: 70
End: 2020-11-09
Payer: MEDICARE

## 2020-11-09 VITALS
OXYGEN SATURATION: 98 % | SYSTOLIC BLOOD PRESSURE: 106 MMHG | HEART RATE: 85 BPM | TEMPERATURE: 97.5 F | RESPIRATION RATE: 13 BRPM | DIASTOLIC BLOOD PRESSURE: 80 MMHG

## 2020-11-09 PROCEDURE — 3331090001 HH PPS REVENUE CREDIT

## 2020-11-09 PROCEDURE — G0157 HHC PT ASSISTANT EA 15: HCPCS

## 2020-11-09 PROCEDURE — 3331090002 HH PPS REVENUE DEBIT

## 2020-11-10 ENCOUNTER — HOME CARE VISIT (OUTPATIENT)
Dept: SCHEDULING | Facility: HOME HEALTH | Age: 70
End: 2020-11-10
Payer: MEDICARE

## 2020-11-10 VITALS
OXYGEN SATURATION: 97 % | RESPIRATION RATE: 16 BRPM | HEART RATE: 89 BPM | DIASTOLIC BLOOD PRESSURE: 73 MMHG | TEMPERATURE: 98.6 F | SYSTOLIC BLOOD PRESSURE: 106 MMHG

## 2020-11-10 PROCEDURE — 3331090001 HH PPS REVENUE CREDIT

## 2020-11-10 PROCEDURE — 3331090002 HH PPS REVENUE DEBIT

## 2020-11-10 PROCEDURE — G0157 HHC PT ASSISTANT EA 15: HCPCS

## 2020-11-10 PROCEDURE — G0299 HHS/HOSPICE OF RN EA 15 MIN: HCPCS

## 2020-11-11 ENCOUNTER — HOME CARE VISIT (OUTPATIENT)
Dept: HOME HEALTH SERVICES | Facility: HOME HEALTH | Age: 70
End: 2020-11-11
Payer: MEDICARE

## 2020-11-11 VITALS
TEMPERATURE: 98.4 F | SYSTOLIC BLOOD PRESSURE: 115 MMHG | HEART RATE: 86 BPM | OXYGEN SATURATION: 97 % | DIASTOLIC BLOOD PRESSURE: 76 MMHG | RESPIRATION RATE: 16 BRPM

## 2020-11-11 PROCEDURE — 3331090002 HH PPS REVENUE DEBIT

## 2020-11-11 PROCEDURE — 3331090001 HH PPS REVENUE CREDIT

## 2020-11-12 ENCOUNTER — HOME CARE VISIT (OUTPATIENT)
Dept: SCHEDULING | Facility: HOME HEALTH | Age: 70
End: 2020-11-12
Payer: MEDICARE

## 2020-11-12 VITALS
DIASTOLIC BLOOD PRESSURE: 74 MMHG | HEART RATE: 107 BPM | SYSTOLIC BLOOD PRESSURE: 118 MMHG | RESPIRATION RATE: 14 BRPM | TEMPERATURE: 99 F | OXYGEN SATURATION: 98 %

## 2020-11-12 VITALS
RESPIRATION RATE: 17 BRPM | OXYGEN SATURATION: 98 % | DIASTOLIC BLOOD PRESSURE: 70 MMHG | HEART RATE: 95 BPM | SYSTOLIC BLOOD PRESSURE: 114 MMHG | TEMPERATURE: 97.7 F

## 2020-11-12 PROCEDURE — 3331090001 HH PPS REVENUE CREDIT

## 2020-11-12 PROCEDURE — G0151 HHCP-SERV OF PT,EA 15 MIN: HCPCS

## 2020-11-12 PROCEDURE — 3331090002 HH PPS REVENUE DEBIT

## 2020-11-13 ENCOUNTER — HOME CARE VISIT (OUTPATIENT)
Dept: SCHEDULING | Facility: HOME HEALTH | Age: 70
End: 2020-11-13
Payer: MEDICARE

## 2020-11-13 VITALS
DIASTOLIC BLOOD PRESSURE: 75 MMHG | OXYGEN SATURATION: 97 % | SYSTOLIC BLOOD PRESSURE: 113 MMHG | HEART RATE: 98 BPM | TEMPERATURE: 98.2 F

## 2020-11-13 PROCEDURE — G0157 HHC PT ASSISTANT EA 15: HCPCS

## 2020-11-13 PROCEDURE — 3331090002 HH PPS REVENUE DEBIT

## 2020-11-13 PROCEDURE — 3331090001 HH PPS REVENUE CREDIT

## 2020-11-14 ENCOUNTER — HOME CARE VISIT (OUTPATIENT)
Dept: HOME HEALTH SERVICES | Facility: HOME HEALTH | Age: 70
End: 2020-11-14
Payer: MEDICARE

## 2020-11-14 PROCEDURE — 3331090001 HH PPS REVENUE CREDIT

## 2020-11-14 PROCEDURE — 3331090002 HH PPS REVENUE DEBIT

## 2020-11-14 NOTE — DISCHARGE SUMMARY
[address] [place] [address]    Name:  Ferd Dandy  MR#:   [id number]  :  1950  ACCOUNT #:  [de-identified]  ADMIT DATE:  10/28/2020  DISCHARGE DATE:  10/29/2020    SUMMARY OF HOSPITALIZATION:  The patient was admitted with a diagnosis of osteoarthritis of the knee and underwent a total knee replacement using the DePuy Attune posterior-stabilized cemented system. His postoperative course was benign and at the time of discharge, he was ambulating with a walker. He was on a regular diet. He was taking aspirin for venous thromboembolism prophylaxis, oxycodone for pain. He was to follow up with home physical therapy and home nursing, and return to my office in two weeks.       MD LATASHA [email or web address]  D:  2020 15:31  T:  2020 19:45  JOB #:  [id number]

## 2020-11-15 ENCOUNTER — HOME CARE VISIT (OUTPATIENT)
Dept: HOME HEALTH SERVICES | Facility: HOME HEALTH | Age: 70
End: 2020-11-15
Payer: MEDICARE

## 2020-11-15 PROCEDURE — 3331090002 HH PPS REVENUE DEBIT

## 2020-11-15 PROCEDURE — 3331090001 HH PPS REVENUE CREDIT

## 2020-11-16 ENCOUNTER — HOME CARE VISIT (OUTPATIENT)
Dept: SCHEDULING | Facility: HOME HEALTH | Age: 70
End: 2020-11-16
Payer: MEDICARE

## 2020-11-16 PROCEDURE — G0157 HHC PT ASSISTANT EA 15: HCPCS

## 2020-11-16 PROCEDURE — 3331090002 HH PPS REVENUE DEBIT

## 2020-11-16 PROCEDURE — 3331090001 HH PPS REVENUE CREDIT

## 2020-11-17 ENCOUNTER — HOME CARE VISIT (OUTPATIENT)
Dept: SCHEDULING | Facility: HOME HEALTH | Age: 70
End: 2020-11-17
Payer: MEDICARE

## 2020-11-17 VITALS
HEART RATE: 84 BPM | DIASTOLIC BLOOD PRESSURE: 74 MMHG | TEMPERATURE: 98.6 F | RESPIRATION RATE: 16 BRPM | SYSTOLIC BLOOD PRESSURE: 107 MMHG | OXYGEN SATURATION: 97 %

## 2020-11-17 PROCEDURE — 3331090002 HH PPS REVENUE DEBIT

## 2020-11-17 PROCEDURE — 3331090001 HH PPS REVENUE CREDIT

## 2020-11-17 PROCEDURE — G0157 HHC PT ASSISTANT EA 15: HCPCS

## 2020-11-18 ENCOUNTER — HOME CARE VISIT (OUTPATIENT)
Dept: SCHEDULING | Facility: HOME HEALTH | Age: 70
End: 2020-11-18
Payer: MEDICARE

## 2020-11-18 ENCOUNTER — OFFICE VISIT (OUTPATIENT)
Dept: CARDIOLOGY CLINIC | Age: 70
End: 2020-11-18
Payer: MEDICARE

## 2020-11-18 VITALS
HEART RATE: 97 BPM | OXYGEN SATURATION: 97 % | HEIGHT: 69 IN | DIASTOLIC BLOOD PRESSURE: 54 MMHG | WEIGHT: 216.4 LBS | BODY MASS INDEX: 32.05 KG/M2 | SYSTOLIC BLOOD PRESSURE: 89 MMHG | RESPIRATION RATE: 16 BRPM | TEMPERATURE: 96.6 F

## 2020-11-18 VITALS
SYSTOLIC BLOOD PRESSURE: 107 MMHG | DIASTOLIC BLOOD PRESSURE: 73 MMHG | TEMPERATURE: 99.9 F | OXYGEN SATURATION: 97 % | RESPIRATION RATE: 16 BRPM | HEART RATE: 91 BPM

## 2020-11-18 DIAGNOSIS — I48.91 ATRIAL FIBRILLATION, UNSPECIFIED TYPE (HCC): Primary | ICD-10-CM

## 2020-11-18 PROCEDURE — G8754 DIAS BP LESS 90: HCPCS | Performed by: INTERNAL MEDICINE

## 2020-11-18 PROCEDURE — 3331090002 HH PPS REVENUE DEBIT

## 2020-11-18 PROCEDURE — G8432 DEP SCR NOT DOC, RNG: HCPCS | Performed by: INTERNAL MEDICINE

## 2020-11-18 PROCEDURE — G8752 SYS BP LESS 140: HCPCS | Performed by: INTERNAL MEDICINE

## 2020-11-18 PROCEDURE — G8417 CALC BMI ABV UP PARAM F/U: HCPCS | Performed by: INTERNAL MEDICINE

## 2020-11-18 PROCEDURE — 3331090001 HH PPS REVENUE CREDIT

## 2020-11-18 PROCEDURE — G8428 CUR MEDS NOT DOCUMENT: HCPCS | Performed by: INTERNAL MEDICINE

## 2020-11-18 PROCEDURE — 99214 OFFICE O/P EST MOD 30 MIN: CPT | Performed by: INTERNAL MEDICINE

## 2020-11-18 PROCEDURE — G0157 HHC PT ASSISTANT EA 15: HCPCS

## 2020-11-18 PROCEDURE — G8536 NO DOC ELDER MAL SCRN: HCPCS | Performed by: INTERNAL MEDICINE

## 2020-11-18 NOTE — PATIENT INSTRUCTIONS
Testing Echo Please call [facility] scheduling at [phone]  to schedule an appointment. All testing is completed at [facility] **call office 3-5 days after testing is completed for results**

## 2020-11-18 NOTE — PROGRESS NOTES
Maximo Espinal presents today for   Chief Complaint   Patient presents with    Follow-up     Echo testing       Maximo Espinal preferred language for health care discussion is english/other. Personal Protective Equipment:   Personal Protective Equipment was used including: mask-surgical and hands-gloves. Patient was placed on no precaution(s). Patient was masked. Is someone accompanying this pt? Yes; Spouse    Is the patient using any DME equipment during OV? Yes; Cane    Depression Screening:  3 most recent PHQ Screens 8/17/2020   Little interest or pleasure in doing things Not at all   Feeling down, depressed, irritable, or hopeless Not at all   Total Score PHQ 2 0       Learning Assessment:  Learning Assessment 4/29/2015   PRIMARY LEARNER Patient   HIGHEST LEVEL OF EDUCATION - PRIMARY LEARNER  SOME COLLEGE   BARRIERS PRIMARY LEARNER NONE   CO-LEARNER CAREGIVER No   PRIMARY LANGUAGE ENGLISH    NEED No   LEARNER PREFERENCE PRIMARY READING   LEARNING SPECIAL TOPICS no   ANSWERED BY self   RELATIONSHIP SELF       Abuse Screening:  Abuse Screening Questionnaire 4/28/2020   Do you ever feel afraid of your partner? N   Are you in a relationship with someone who physically or mentally threatens you? N   Is it safe for you to go home? Y       Fall Risk  Fall Risk Assessment, last 12 mths 8/17/2020   Able to walk? Yes   Fall in past 12 months? No       Pt currently taking Anticoagulant therapy? No    Coordination of Care:  1. Have you been to the ER, urgent care clinic since your last visit? Hospitalized since your last visit? [facility] 10/28/2020 - Knee Pain    2. Have you seen or consulted any other health care providers outside of the [address] [place] [address] since your last visit? Include any pap smears or colon screening.  No

## 2020-11-18 NOTE — PROGRESS NOTES
[facility]    Mr. Anai Do is a 72 y.o. male with a history of diabetes, hypertension, hyperlipidemia and DJD, sleep apnea    Patient is here today for add-on appointment. He denies any symptoms related to atrial fibrillation. His dose of losartan was reduced 5 days ago because of low blood pressure. His blood pressure has improved since then. This morning his blood pressure was 711 systolic  He is tolerating his cardiac medication without any problem. He does not report any symptoms of angina or heart failure at this time  He denies any palpitation, presyncope or syncope. Past Medical History:   Diagnosis Date    Arrhythmia     Atrial Fib    Arthritis     Degenerative disc disease, lumbar     Diabetes ([facility]     HLD (hyperlipidemia)     Hypertension     Intracranial hemorrhage, cerebellar ([facility] 2018    Hypertensive crisis, non-traumatic    PPD positive, treated 2018    was treated    Sleep apnea     on cpap    Spondylosis of lumbar spine          Past Surgical History:   Procedure Laterality Date    COLONOSCOPY  10/13/2015 Return 10/14/2025    Dr. Zheng Singh; screening    HX COLONOSCOPY      HX KNEE ARTHROSCOPY Right 1986    right knee    HX KNEE REPLACEMENT Left 01/2020    HX OTHER SURGICAL      cyst removed from chin    HX WRIST FRACTURE TX Left     ORIF       Current Outpatient Medications   Medication Sig    oxyCODONE IR (ROXICODONE) 5 mg immediate release tablet Take 5 mg by mouth every eight (8) hours as needed for Pain. 1-2 tabs every 8 hours prn pain    apixaban (ELIQUIS) 5 mg tablet Take 1 Tab by mouth two (2) times a day.  amLODIPine (NORVASC) 5 mg tablet Take 1 Tab by mouth daily.  atorvastatin (LIPITOR) 10 mg tablet Take 1 Tab by mouth daily.  losartan (COZAAR) 50 mg tablet Take 1 Tab by mouth daily.  (Patient taking differently: Take 25 mg by mouth daily.)    metoprolol tartrate (LOPRESSOR) 25 mg tablet Take 1 Tab by mouth two (2) times a day.  acetaminophen (TYLENOL) 500 mg tablet Take 500 mg by mouth every six (6) hours as needed for Pain.  HYDROcodone-acetaminophen (NORCO) 5-325 mg per tablet Take  by mouth two (2) times daily as needed.  cetirizine (ZYRTEC) 10 mg tablet Take 1 Tab by mouth daily.  metFORMIN ER (GLUCOPHAGE XR) 500 mg tablet Take 2 tabs po daily with a meal (Patient taking differently: two (2) times a day.)    SITagliptin (JANUVIA) 50 mg tablet Take 1 Tab by mouth daily.  sildenafil citrate (VIAGRA) 100 mg tablet Take 1 Tab by mouth as needed (E.D.). Once daily    tamsulosin (FLOMAX) 0.4 mg capsule Take 1 Cap by mouth daily.  melatonin 5 mg tablet Take  by mouth.  aspirin delayed-release 81 mg tablet Take 1 Tab by mouth daily. No current facility-administered medications for this visit. Allergies and Sensitivities:  No Known Allergies    Family History:  Family History   Problem Relation Age of Onset    No Known Problems Mother     No Known Problems Father        Social History:  Social History     Tobacco Use    Smoking status: Never Smoker    Smokeless tobacco: Never Used   Substance Use Topics    Alcohol use: Yes     Alcohol/week: 1.7 standard drinks     Types: 2 Cans of beer per week    Drug use: No     He  reports that he has never smoked. He has never used smokeless tobacco.  He  reports current alcohol use of about 1.7 standard drinks of alcohol per week. Review of Systems:  Cardiac symptoms as noted above in HPI. All others negative. Denies fatigue, malaise, skin rash, joint pain, blurring vision, photophobia, neck pain, hemoptysis, chronic cough, nausea, vomiting, hematuria, burning micturition, BRBPR, chronic headaches.     Physical Exam:  BP Readings from Last 3 Encounters:   11/18/20 (!) 89/54   11/17/20 107/73   11/16/20 107/74         Pulse Readings from Last 3 Encounters:   11/18/20 97   11/17/20 91   11/16/20 84          Wt Readings from Last 3 Encounters:   11/18/20 216 lb 6.4 oz (98.2 kg)   10/28/20 223 lb (101.2 kg)   08/17/20 220 lb (99.8 kg)       Objective:   Vital Signs: (As obtained by patient/caregiver at home)  Visit Vitals  BP (!) 89/54 (BP 1 Location: Left arm, BP Patient Position: Sitting)   Pulse 97   Temp (!) 96.6 °F (35.9 °C) (Temporal)   Resp 16   Ht 5' 9\" (1.753 m)   Wt 216 lb 6.4 oz (98.2 kg)   SpO2 97%   BMI 31.96 kg/m²      Constitutional: Oriented to person, place, and time. HENT: Head: Normocephalic and atraumatic. Neck: No JVD present. Cardiovascular: Regular rhythm. No murmur, gallop or rubs appreciated  Lung: Breath sounds normal. No respiratory distress. No ronchi or rales appreciated  Abdominal: No tenderness. No rebound and no guarding. Musculoskeletal: There is no lower extremity edema. No cynosis  Lymphadenopathy:  No cervical or supraclavicular adenopathy appriciated. Review of Data  LABS:   Lab Results   Component Value Date/Time    Sodium 140 10/23/2020 01:31 PM    Potassium 4.0 10/23/2020 01:31 PM    Chloride 104 10/23/2020 01:31 PM    CO2 28 10/23/2020 01:31 PM    Glucose 160 (H) 10/23/2020 01:31 PM    BUN 15 10/23/2020 01:31 PM    Creatinine 0.98 10/23/2020 01:31 PM     Lipids Latest Ref Rng & Units 6/25/2019 7/13/2018 1/18/2018 7/12/2017 2/8/2017   Chol, Total <200 MG/ 168 167 200(H) 177   HDL 40 - 60 MG/DL 48 60 44 58 53   LDL 0 - 100 MG/DL 44.4 92.8 93 120. 4(H) 92.6   Trig <150 MG/ 76 151(H) 108 157(H)   Chol/HDL Ratio 0 - 5.0   2.4 2.8 - 3.4 3.3   Some recent data might be hidden     Lab Results   Component Value Date/Time    ALT (SGPT) 29 10/23/2020 01:31 PM     Lab Results   Component Value Date/Time    Hemoglobin A1c 6.2 (H) 10/23/2020 01:31 PM    Hemoglobin A1c (POC) 5.8 08/11/2015 08:45 AM       EKG  Sinus rhythm. Incomplete right bundle branch block.   No ST changes of ischemia    ECHO (01/20)  Left Ventricle Normal cavity size, wall thickness and systolic function (ejection fraction normal). The estimated ejection fraction is 55 - 60%. Visually measured ejection fraction. No regional wall motion abnormality noted. There is mild (grade 1) left ventricular diastolic dysfunction. Wall Scoring The left ventricular wall motion is normal.            Left Atrium Mildly dilated left atrium. Left Atrium volume index is 33 mL/m2. The volume is normal.   Right Ventricle Normal cavity size and global systolic function. Right Atrium Normal cavity size. Aortic Valve Normal valve structure, trileaflet valve structure and no stenosis. Trace aortic valve regurgitation. Mitral Valve No stenosis. Mitral valve non-specific thickening. Trace regurgitation. Tricuspid Valve No stenosis. Non-specific thickening. Trace regurgitation. Pulmonary arterial systolic pressure is 93.3 mmHg. There is no evidence of pulmonary hypertension. Pulmonic Valve Pulmonic valve not well visualized. No stenosis. Trace regurgitation. Aorta Normal aortic root and ascending aorta. STRESS TEST (01/20)  · Baseline ECG: Sinus rhythm. · Gated SPECT: Left ventricular function post-stress was normal. Calculated ejection fraction is 68%. · Myocardial perfusion imaging supports a low risk stress test.  · Technically difficult study. Inferior perfusion defect is most consistent with diaphragmatic attenuation artifact. There was no convincing evidence of significant reversible defect to suggest ongoing major ischemia. IMPRESSION & PLAN:  Mr. Octavia Kyle is 72 y.o. male with multiple medical problem    Hypertension:  Blood pressure low. Patient is asymptomatic. His dose of losartan has reduced recently by PCP from 50-25. He is on beta-blocker as well. Will check blood pressure again. He is completely asymptomatic    Hyperlipidemia:  Currently on atorvastatin. Last LDL 44. Continue same PCP is checking lipid profile regularly    Diabetes:  Goal hemoglobin A1c less than 7 is recommended.   Defer to PCP. Last A1c less 6.2 per patient    Atrial fibrillation:  Confirmed by EKG in August 2020  Remains in atrial fibrillation on exam  Continue beta-blocker and anticoagulation. No bleeding side effect  Will order limited echo because of A. fib    Thank you for allowing me to participate in patient care. Please feel free to call me if you have any question or concern. Wendi Chawla MD  Please note: This document has been produced using voice recognition software. Unrecognized errors in transcription may be present.

## 2020-11-19 ENCOUNTER — HOME CARE VISIT (OUTPATIENT)
Dept: SCHEDULING | Facility: HOME HEALTH | Age: 70
End: 2020-11-19
Payer: MEDICARE

## 2020-11-19 VITALS
RESPIRATION RATE: 16 BRPM | TEMPERATURE: 98.6 F | DIASTOLIC BLOOD PRESSURE: 68 MMHG | HEART RATE: 92 BPM | SYSTOLIC BLOOD PRESSURE: 100 MMHG | OXYGEN SATURATION: 98 %

## 2020-11-19 PROCEDURE — 3331090002 HH PPS REVENUE DEBIT

## 2020-11-19 PROCEDURE — G0157 HHC PT ASSISTANT EA 15: HCPCS

## 2020-11-19 PROCEDURE — 3331090001 HH PPS REVENUE CREDIT

## 2020-11-20 ENCOUNTER — HOME CARE VISIT (OUTPATIENT)
Dept: SCHEDULING | Facility: HOME HEALTH | Age: 70
End: 2020-11-20
Payer: MEDICARE

## 2020-11-20 VITALS
HEART RATE: 75 BPM | TEMPERATURE: 99.7 F | OXYGEN SATURATION: 97 % | SYSTOLIC BLOOD PRESSURE: 110 MMHG | DIASTOLIC BLOOD PRESSURE: 76 MMHG

## 2020-11-20 VITALS
DIASTOLIC BLOOD PRESSURE: 68 MMHG | SYSTOLIC BLOOD PRESSURE: 104 MMHG | OXYGEN SATURATION: 98 % | HEART RATE: 104 BPM | TEMPERATURE: 98.4 F | RESPIRATION RATE: 16 BRPM

## 2020-11-20 PROCEDURE — A6212 FOAM DRG <=16 SQ IN W/BORDER: HCPCS

## 2020-11-20 PROCEDURE — G0151 HHCP-SERV OF PT,EA 15 MIN: HCPCS

## 2020-11-20 PROCEDURE — 3331090002 HH PPS REVENUE DEBIT

## 2020-11-20 PROCEDURE — A4216 STERILE WATER/SALINE, 10 ML: HCPCS

## 2020-11-20 PROCEDURE — 3331090003 HH PPS REVENUE ADJ

## 2020-11-20 PROCEDURE — 3331090001 HH PPS REVENUE CREDIT

## 2020-11-23 ENCOUNTER — HOSPITAL ENCOUNTER (OUTPATIENT)
Dept: PHYSICAL THERAPY | Age: 70
Discharge: HOME | End: 2020-11-23
Payer: MEDICARE

## 2020-11-23 PROCEDURE — 97161 PT EVAL LOW COMPLEX 20 MIN: CPT

## 2020-11-23 PROCEDURE — 97110 THERAPEUTIC EXERCISES: CPT

## 2020-11-23 PROCEDURE — 97530 THERAPEUTIC ACTIVITIES: CPT

## 2020-11-23 NOTE — PROGRESS NOTES
[address] [place] [address] PHYSICAL THERAPY   [facility], [address],[place] Sorensen, [address] [facility] - Phone: [phone]  Fax: [phone] / [address] [facility]  Patient Name: Laquita Jay : 1950   Medical   Diagnosis: Right knee pain [M25.561] Treatment Diagnosis: Right knee pain [M25.561]   Onset Date: 10/28/20     Referral Source: Alexandrea Velazquez MD Start of [facility]): 2020   Prior Hospitalization: See medical history Provider #: [id number]   Prior Level of Function: I in functional transfers and ambulation; able to negotiate stairs with reciprocal gait   Comorbidities: L TKR 2020, OA, HTN, A-Fib, DM, latex allergy, heart disease   Medications: Verified on Patient Summary List   The Plan of Care and following information is based on the information from the initial evaluation.   ========================================================================  Assessment / key information: Patient is a 72 y.o. male who presents to [facility] with a diagnosis of Right knee pain [M25.561]. Patient is his own historian. Living situation is as follows: with spouse in 2 story PH with 1 MARGARITO; railing on the left when ascending stairs. Occupation: NA. Pt presents with c/o right knee pain, edema, decreased mobility and decreased strength s/p R TKR on 10/28/20. Pt received HHPT and was ambulating with a RW, which he is no longer using. Pt currently presents ambulating with SC and two point step through pattern. Pt is aware of s/s of infection and is completing the exercises daily at home which he was instructed in. He currently negotiates stairs with step-to gait. No LOB/LE buckling/numbness/red flags at this time. He has a blister just lateral to the proximal lateral incision which he said he \"got in the hospital after surgery bc they used a certain type of medical tape and I had a reaction. \" Pt changes the water resistant adhesive on it daily and performs skin checks. Current Deficits include: pain, edema, decreased mobility, decreased strength, and decreased postural awareness with resulting limitations in ADL's and in functional abilities. Patient will benefit from a comprehensive POC/HEP to address impairments and restore function in order to return to prior level of function and prevent secondary impairments. Impairments are as follows:   Pain: current pain level 3-6/10/10, pain level at worst 8/10, and pain level at best 2/10 TTP along borders of the incision of R knee  Posture flexed R knee in stance and with ambulation Observations Incision clean, dry, intact; no visible dehiscence, erythema, or drainage noted. Gait antalgic with SC and step through continuous gait with R knee flexion throughout stance phase and with decreased push off/hip extension at terminal stance   AROM/PROM: L knee 0-125/0-130 R knee 10-90/5-100 (in supine)  Strength L knee 5/5; R knee extension 3+/5 and knee flexion 4-/5  Special Tests sensation grossly intact t/o RLE to light touch, SLR 5sec without extensor lag; circumferential measures: superior patellar poles L 44.5cm R 47.5cm inf poles L 41.5cm R 43.5cm and fig 8 at ankles L 68cm and R 73cm; no pitting edema RLE  Functional Tests: SLS LLE 10sec RLE unable  Functional Deficits include: see above. Patient's FOTO score was a 29/100 indicating decreased function.  Patient will benefit from a POC addressing such impairments and limitations in order to improve quality of life and return to PLOF.    ========================================================================  Eval Complexity: History: HIGH Complexity :3+ comorbidities / personal factors will impact the outcome/ POC Exam:MEDIUM Complexity : 3 Standardized tests and measures addressing body structure, function, activity limitation and / or participation in recreation  Presentation: MEDIUM Complexity : Evolving with changing characteristics  Clinical Decision Making:MEDIUM Complexity : FOTO score of 26-74Overall Complexity:MEDIUM  Problem List: pain affecting function, decrease ROM, decrease strength, edema affecting function, impaired gait/ balance, decrease ADL/ functional abilitiies, decrease activity tolerance, decrease flexibility/ joint mobility and decrease transfer abilities   Treatment Plan may include any combination of the following: Therapeutic exercise, Therapeutic activities, Neuromuscular re-education, Physical agent/modality, Gait/balance training, Manual therapy, Patient education, Self Care training, Functional mobility training, Home safety training and Stair training  Patient / Family readiness to learn indicated by: asking questions  Persons(s) to be included in education: patient (P)  Barriers to Learning/Limitations: None  Measures taken: A HEP was initiated to assist with POC in restoring function; ice   Patient Goal (s): \"walk better\"   Patient self reported health status: fair  Rehabilitation Potential: good   Short Term Goals: To be accomplished in  2  treatments:  1. Pt will be compliant with HEP for symptom management at home and independent in such for self management at discharge.  Long Term Goals: To be accomplished in  10  treatments:  1. Pt will demonstrate an increased FOTO score to 46/100 in order to improve function  2. Pt will demonstrate increased right knee AROM to >/= 0-120deg in order to more easily ambulate and negotiate stairs  3. Pt will demonstrate decreased edema in the right knee to within 1cm from that of the left knee in order to allow for improved mobility and ability to ambulate  4.  Pt will be able to ambulate 250ft continuously with LRAD without R knee pain/LOB/rest break in order to improve community ambulation    Frequency / Duration:     Patient to be seen  2  times per week for 10  treatments:  Patient / Caregiver education and instruction: exercises and other icing    Therapist Signature: Jovita Goddard, PT Date: 68/35/3853   Certification Period: 90 days  11/23/20-2/22/21 Time: 9:32 AM   ========================================================================  I certify that the above Physical Therapy Services are being furnished while the patient is under my care. I agree with the treatment plan and certify that this therapy is necessary. Physician Signature:        Date:       Time:   Please sign and return to [facility], [facility]. or you may fax the signed copy to [phone]. Thank you.

## 2020-11-23 NOTE — PROGRESS NOTES
PHYSICAL THERAPY - DAILY TREATMENT NOTE    Patient Name: Travis Carpenter        Date: 2020  : 1950   yes Patient  Verified  Visit #:   1   of   10  Insurance: Payor: Naseem Carab[place] / Plan: [place] & [place] / Product Type: Medicare /      In time: 225 Out time: 320   Total Treatment Time: 55     Medicare/BCBS Time Tracking (below)   Total Timed Codes (min):  24 1:1 Treatment Time:  40     TREATMENT AREA =  Right knee pain [M25.561]    SUBJECTIVE  Pain Level (on 0 to 10 scale):  3-6  / 10   Medication Changes/New allergies or changes in medical history, any new surgeries or procedures?    no  If yes, update Summary List   Subjective Functional Status/Changes:  []  No changes reported   See Eval for subjective information and c/o.          OBJECTIVE  Modalities Rationale:     decrease edema, decrease inflammation and decrease pain to improve patient's ability to ambulate post session   min [] Estim, Type-unattended  []IFC  []Premod     []w/ice   []w/heat  []  w/US   Type-attended  []TENS instruct  []NMES  []Other:   []w/ice   []w/heat   []  w/US  Location:                                         min []  Mechanical Traction: type/lbs:                   []  pro   []  sup   []  int   []  cont    []  before manual    []  after manual    min []  Ultrasound, settings/location:      min []  Iontophoresis w/ dexamethasone, location:                                               []  take home patch       []  in clinic   15 min [x]  Ice     []  Heat    location/position: R knee while elevated    min []  Vasopneumatic Device, press/temp:     min []  Other:    [x] Skin assessment post-treatment (if applicable):    [x]  intact    []  redness- no adverse reaction     []redness  adverse reaction:        14 min Therapeutic Exercise:  [x]  See flow sheet   Rationale:      increase ROM and increase strength to improve the patients ability to ambulate and transfer     10 min Therapeutic Activity: [x]  See flow sheet  Body part R knee   Rationale:    increase ROM and decrease edema to improve the patients ability to ambulate and transfer with decreased difficulty     Billed With/As:   [] TE   [x] TA   [] Neuro   [] Self Care Patient Education: [x] Review HEP    [] Progressed/Changed HEP based on:   [x] positioning   [x] body mechanics   [x] transfers   [x] heat/ice application    [x] other: education on importance of icing/elevation, education on s/s of infection, avoidance of aggravating factors, skin inspection, education on LE decongestion worksheet to decrease edema      Other Objective/Functional Measures:    See Eval     Post Treatment Pain Level (on 0 to 10) scale:   3  / 10     ASSESSMENT  Assessment/Changes in Function:     See Eval     []  See Progress Note/Recertification   Patient will continue to benefit from skilled PT services to modify and progress therapeutic interventions to attain remaining goals. Progress toward goals / Updated goals:  Pt denied sharp pain or red flags with initial eval or therapeutic ex. See initial eval. HEP administered.       PLAN  [x]  Upgrade activities as tolerated yes Continue plan of care   []  Discharge due to :    []  Other:      Therapist: Seamus Go PT    Date: 11/23/2020 Time: 9:33 AM     Future Appointments   Date Time Provider Leanne Carson   11/23/2020  2:00 PM [address]

## 2020-12-04 ENCOUNTER — HOSPITAL ENCOUNTER (OUTPATIENT)
Dept: PHYSICAL THERAPY | Age: 70
Discharge: HOME | End: 2020-12-04
Payer: MEDICARE

## 2020-12-04 PROCEDURE — 97110 THERAPEUTIC EXERCISES: CPT

## 2020-12-04 NOTE — PROGRESS NOTES
PHYSICAL THERAPY - DAILY TREATMENT NOTE    Patient Name: Melissa Mares        Date: 2020  : 1950   yes Patient  Verified  Visit #:      of   10  Insurance: Payor: Heidi Failing / Plan: [place] MEDICARE PART A & B / Product Type: Medicare /      In time:  Out time:    Total Treatment Time: 45     Medicare/BCBS Time Tracking (below)   Total Timed Codes (min):  35 1:1 Treatment Time:  35     TREATMENT AREA =  Right knee pain [M25.561]    SUBJECTIVE  Pain Level (on 0 to 10 scale):  5  / 10   Medication Changes/New allergies or changes in medical history, any new surgeries or procedures?    no  If yes, update Summary List   Subjective Functional Status/Changes:  []  No changes reported     I'm doing fair I guess. The walking could be better. OBJECTIVE  Modalities Rationale:     decrease inflammation and decrease pain to improve patient's ability to perform pain free ADLs. min [] Estim, type/location:                                      []  att     []  unatt     []  w/US     []  w/ice    []  w/heat    min []  Mechanical Traction: type/lbs                   []  pro   []  sup   []  int   []  cont    []  before manual    []  after manual    min []  Ultrasound, settings/location:      min []  Iontophoresis w/ dexamethasone, location:                                               []  take home patch       []  in clinic   10 min [x]  Ice     []  Heat    location/position: (R) knee, supine. min []  Vasopneumatic Device, press/temp:     min []  Other:    [x] Skin assessment post-treatment (if applicable):    [x]  intact    []  redness- no adverse reaction     []redness  adverse reaction:        30 min Therapeutic Exercise:  [x]  See flow sheet   Rationale:      increase ROM, increase strength, improve coordination and improve balance to improve the patients ability to perform pain free ADLs.      5 (NB) min Manual Therapy: (R) knee PROM. Patellar mobs.      Rationale:      decrease pain, increase ROM, increase tissue extensibility and decrease trigger points to improve patient's ability to perform pain free ADLs. The manual therapy interventions were performed at a separate and distinct time from the therapeutic activities interventions. Billed With/As:   [x] TE   [] TA   [] Neuro   [] Self Care Patient Education: [x] Review HEP    [] Progressed/Changed HEP based on:   [] positioning   [] body mechanics   [] transfers   [] heat/ice application    [] other:      Other Objective/Functional Measures:    Knee flexion during heel slides = 118 deg     Added multiple exercises to improve (R) LE strength/mobility for ADLs. See flow sheet. Post Treatment Pain Level (on 0 to 10) scale:   3  / 10     ASSESSMENT  Assessment/Changes in Function:     No exacerbation of symptoms with today's session. []  See Progress Note/Recertification   Patient will continue to benefit from skilled PT services to modify and progress therapeutic interventions, address functional mobility deficits, address ROM deficits, address strength deficits, analyze and address soft tissue restrictions, analyze and cue movement patterns, analyze and modify body mechanics/ergonomics and assess and modify postural abnormalities to attain remaining goals. Progress toward goals / Updated goals:    Initiated therex, good progress toward LTG #2.       PLAN  [x]  Upgrade activities as tolerated yes Continue plan of care   []  Discharge due to :    []  Other:      Therapist: Niall Pride PTA    Date: 12/4/2020 Time: 11:07 AM     Future Appointments   Date Time Provider Leanne Carson   12/7/2020  9:45 AM [address] [facility] [address] [facility]   12/8/2020 11:30 AM [facility] ECHO RM 1 [facility]   12/9/2020 10:30 AM [facility] PT [place] [facility] 1 [facility] [facility]   12/11/2020 11:00 AM F[address]   12/16/2020 10:30 AM [facility] PT [place] [facility] 1 [place] [facility]   12/18/2020 10:15 AM [facility] [address]   12/21/2020 10:30 AM [facility] PT [place] CENTER 1 [facility]   12/24/2020 10:00 AM AUGUSTO Shi [address]

## 2020-12-07 ENCOUNTER — HOSPITAL ENCOUNTER (OUTPATIENT)
Dept: PHYSICAL THERAPY | Age: 70
Discharge: HOME | End: 2020-12-07
Payer: MEDICARE

## 2020-12-07 PROCEDURE — 97110 THERAPEUTIC EXERCISES: CPT

## 2020-12-07 PROCEDURE — 97016 VASOPNEUMATIC DEVICE THERAPY: CPT

## 2020-12-07 PROCEDURE — 97140 MANUAL THERAPY 1/> REGIONS: CPT

## 2020-12-07 NOTE — PROGRESS NOTES
PHYSICAL THERAPY - DAILY TREATMENT NOTE    Patient Name: Lane Moran        Date: 2020  : 1950   yes Patient  Verified  Visit #:   3   of   10  Insurance: Payor: Stacy Ayon / Plan: [place] MED[place] & B / Product Type: Medicare /      In time: 729 Out time:    Total Treatment Time: 56     Medicare/BCBS Time Tracking (below)   Total Timed Codes (min):  46 1:1 Treatment Time:  41     TREATMENT AREA =  Right knee pain [M25.561]    SUBJECTIVE  Pain Level (on 0 to 10 scale):  5 / 10   Medication Changes/New allergies or changes in medical history, any new surgeries or procedures?    no  If yes, update Summary List   Subjective Functional Status/Changes:  []  No changes reported   \"My R knee is stiff and hurts\"     OBJECTIVE  Modalities Rationale:     decrease inflammation and decrease pain to improve patient's ability to perform pain free ADLs. min [] Estim, type/location:                                      []  att     []  unatt     []  w/US     []  w/ice    []  w/heat    min []  Mechanical Traction: type/lbs                   []  pro   []  sup   []  int   []  cont    []  before manual    []  after manual    min []  Ultrasound, settings/location:      min []  Iontophoresis w/ dexamethasone, location:                                               []  take home patch       []  in clinic    min [x]  Ice     []  Heat    location/position: (R) knee, supine. 10 min [x]  Vasopneumatic Device, press/temp: R knee elevated LP/LT    min []  Other:    [x] Skin assessment post-treatment (if applicable):    [x]  intact    []  redness- no adverse reaction     []redness  adverse reaction:        36/31 min Therapeutic Exercise:  [x]  See flow sheet   Rationale:      increase ROM, increase strength, improve coordination and improve balance to improve the patients ability to perform pain free ADLs. 10 min Manual Therapy: (R) knee PROM. Patellar mobs.  tibipofemoral into extension grade I, TPR medial HS and medial head of gastrocs  All to tolerance    Rationale:      decrease pain, increase ROM, increase tissue extensibility and decrease trigger points to improve patient's ability to perform pain free ADLs. The manual therapy interventions were performed at a separate and distinct time from the therapeutic activities interventions. Billed With/As:   [x] TE   [] TA   [] Neuro   [] Self Care Patient Education: [x] Review HEP    [] Progressed/Changed HEP based on:   [] positioning   [] body mechanics   [] transfers   [] heat/ice application    [x] other: skin checks/icing     Other Objective/Functional Measures: Added walking high knees with UE support, sit-stands with overhead push  Knee flexion during heel slides =120deg  Ext: -10 from TKE at start/-5 at end of session     Post Treatment Pain Level (on 0 to 10) scale:   4-5/ 10     ASSESSMENT  Assessment/Changes in Function:   Rest breaks provided throughout session in addition to VCs throughout for technique/proper knee position with mini squats. No drainage noted from healing blister lateral to proximal incision RLE. []  See Progress Note/Recertification   Patient will continue to benefit from skilled PT services to modify and progress therapeutic interventions, address functional mobility deficits, address ROM deficits, address strength deficits, analyze and address soft tissue restrictions, analyze and cue movement patterns, analyze and modify body mechanics/ergonomics and assess and modify postural abnormalities to attain remaining goals.    Progress toward goals / Updated goals:  Progressing to LTG 2: 5-120 at end of session R knee AROM       PLAN  [x]  Upgrade activities as tolerated yes Continue plan of care   []  Discharge due to :    []  Other:      Therapist: Heather Leung PT    Date: 12/7/2020 Time: 11:07 AM     Future Appointments   Date Time Provider Leanne Carson   12/7/2020  9:45 AM [address] 12/8/2020 11:30 AM [facility] ECHO RM 1 [facility]   12/9/2020 10:30 AM [facility] PT [facility] 1 [facility]   12/11/2020 11:00 AM Shaunna [address]   12/16/2020 10:30 AM [facility] PT [facility] 1 [address]   12/18/2020 10:15 AM Sherrill Maria PTA [address]   12/21/2020 10:30 AM [facility] PT [facility] [facility] 1 [address]   12/24/2020 10:00 AM Sherrill Maria PTA [address]

## 2020-12-08 ENCOUNTER — HOSPITAL ENCOUNTER (OUTPATIENT)
Dept: NON INVASIVE DIAGNOSTICS | Age: 70
Discharge: HOME | End: 2020-12-08
Attending: INTERNAL MEDICINE
Payer: MEDICARE

## 2020-12-08 VITALS
BODY MASS INDEX: 31.99 KG/M2 | DIASTOLIC BLOOD PRESSURE: 76 MMHG | WEIGHT: 216 LBS | SYSTOLIC BLOOD PRESSURE: 110 MMHG | HEIGHT: 69 IN

## 2020-12-08 DIAGNOSIS — I48.91 ATRIAL FIBRILLATION, UNSPECIFIED TYPE (HCC): ICD-10-CM

## 2020-12-08 LAB
ECHO AO ARCH DIAM: 3.16 CM
ECHO AO ASC DIAM: 3.46 CM
ECHO AO ROOT DIAM: 3.31 CM
ECHO IVC PROX: 2.5 CM
ECHO LA AREA 4C: 27.54 CM2
ECHO LA MAJOR AXIS: 3.4 CM
ECHO LA MINOR AXIS: 1.59 CM
ECHO LA VOL 2C: 79.59 ML (ref 18–58)
ECHO LA VOL 4C: 96.69 ML (ref 18–58)
ECHO LA VOL BP: 93.79 ML (ref 18–58)
ECHO LA VOL/BSA BIPLANE: 43.94 ML/M2 (ref 16–28)
ECHO LA VOLUME INDEX A2C: 37.29 ML/M2 (ref 16–28)
ECHO LA VOLUME INDEX A4C: 45.3 ML/M2 (ref 16–28)
ECHO LV EDV A2C: 108.85 ML
ECHO LV EDV A4C: 110.82 ML
ECHO LV EDV BP: 113.97 ML (ref 67–155)
ECHO LV EDV INDEX A4C: 51.9 ML/M2
ECHO LV EDV INDEX BP: 53.4 ML/M2
ECHO LV EDV NDEX A2C: 51 ML/M2
ECHO LV EJECTION FRACTION A2C: 41 PERCENT
ECHO LV EJECTION FRACTION A4C: 42 PERCENT
ECHO LV EJECTION FRACTION BIPLANE: 40 PERCENT (ref 55–100)
ECHO LV ESV A2C: 64.45 ML
ECHO LV ESV A4C: 64.41 ML
ECHO LV ESV BP: 68.35 ML (ref 22–58)
ECHO LV ESV INDEX A2C: 30.2 ML/M2
ECHO LV ESV INDEX A4C: 30.2 ML/M2
ECHO LV ESV INDEX BP: 32 ML/M2
ECHO LV INTERNAL DIMENSION DIASTOLIC: 4.67 CM (ref 4.2–5.9)
ECHO LV INTERNAL DIMENSION SYSTOLIC: 3.75 CM
ECHO LV IVSD: 1.05 CM (ref 0.6–1)
ECHO LV MASS 2D: 154 G (ref 88–224)
ECHO LV MASS INDEX 2D: 72.1 G/M2 (ref 49–115)
ECHO LV POSTERIOR WALL DIASTOLIC: 0.87 CM (ref 0.6–1)
ECHO LVOT DIAM: 2.16 CM
ECHO LVOT PEAK GRADIENT: 2.38 MMHG
ECHO LVOT SV: 44.4 ML
ECHO LVOT SV: 54.1 ML
ECHO LVOT VTI: 14.73 CM
ECHO PV REGURGITANT MAX VELOCITY: 246.82 CM/S
ECHO RV TAPSE: 1.56 CM (ref 1.5–2)
ECHO TV REGURGITANT MAX VELOCITY: 259 CM/S
ECHO TV REGURGITANT PEAK GRADIENT: 26.8 MMHG
LVOT MG: 1.33 MMHG

## 2020-12-08 PROCEDURE — 93306 TTE W/DOPPLER COMPLETE: CPT

## 2020-12-09 ENCOUNTER — HOSPITAL ENCOUNTER (OUTPATIENT)
Dept: PHYSICAL THERAPY | Age: 70
Discharge: HOME | End: 2020-12-09
Payer: MEDICARE

## 2020-12-09 PROCEDURE — 97110 THERAPEUTIC EXERCISES: CPT

## 2020-12-09 PROCEDURE — 97140 MANUAL THERAPY 1/> REGIONS: CPT

## 2020-12-09 PROCEDURE — 97016 VASOPNEUMATIC DEVICE THERAPY: CPT

## 2020-12-09 NOTE — PROGRESS NOTES
PHYSICAL THERAPY - DAILY TREATMENT NOTE    Patient Name: Maris Langley        Date: 2020  : 1950   yes Patient  Verified  Visit #:   4   of   10  Insurance: Payor: Willie Pruett / Plan: [place] MEDICARE PART A & B / Product Type: Medicare /      In time: 6473 Out time: 1130   Total Treatment Time: 64     Medicare/BCBS Time Tracking (below)   Total Timed Codes (min):  49 1:1 Treatment Time:  44     TREATMENT AREA =  Right knee pain [M25.561]    SUBJECTIVE  Pain Level (on 0 to 10 scale):  5 / 10   Medication Changes/New allergies or changes in medical history, any new surgeries or procedures?    no  If yes, update Summary List   Subjective Functional Status/Changes:  []  No changes reported   \"Its always sore and stiff, but I want to ge off this cane. \"     OBJECTIVE  Modalities Rationale:     decrease inflammation and decrease pain to improve patient's ability to perform pain free ADLs. min [] Estim, type/location:                                      []  att     []  unatt     []  w/US     []  w/ice    []  w/heat    min []  Mechanical Traction: type/lbs                   []  pro   []  sup   []  int   []  cont    []  before manual    []  after manual    min []  Ultrasound, settings/location:      min []  Iontophoresis w/ dexamethasone, location:                                               []  take home patch       []  in clinic    min [x]  Ice     []  Heat    location/position: (R) knee, supine. 15 min [x]  Vasopneumatic Device, press/temp: R knee elevated LP/LT    min []  Other:    [x] Skin assessment post-treatment (if applicable):    [x]  intact    []  redness- no adverse reaction     []redness  adverse reaction:        35/30 (1:1) min Therapeutic Exercise:  [x]  See flow sheet   Rationale:      increase ROM, increase strength, improve coordination and improve balance to improve the patients ability to perform pain free ADLs.       14 min Manual Therapy: STM R rectus femoris, vastus lateralis, VMO, TPR R medial/lateral heads of gastrocs and HS bellies in supine; R tibiofemoral mobes into extension grade II and III to tolerance; (R) knee PROM  All to tolerance    Rationale:      decrease pain, increase ROM, increase tissue extensibility and decrease trigger points to improve patient's ability to perform pain free ADLs. The manual therapy interventions were performed at a separate and distinct time from the therapeutic activities interventions. Billed With/As:   [x] TE   [] TA   [] Neuro   [] Self Care Patient Education: [x] Review HEP    [] Progressed/Changed HEP based on:   [] positioning   [] body mechanics   [] transfers   [x] heat/ice application    [x] other: skin checks/icing     Other Objective/Functional Measures:  Prior to session   End of session 5-115     Post Treatment Pain Level (on 0 to 10) scale:   2/ 10     ASSESSMENT  Assessment/Changes in Function:   Pt with improved ability to flex R knee during swing phase and with improved reciprocal arm swing and heel toe pattern with VCs. Pt able to ambulate throughout clinic without LOB/R knee buckling. He was encouraged to discontinue use of SC at home if comfortable with such and to continue using in the community for safety. He was with verbalized understanding. []  See Progress Note/Recertification   Patient will continue to benefit from skilled PT services to modify and progress therapeutic interventions, address functional mobility deficits, address ROM deficits, address strength deficits, analyze and address soft tissue restrictions, analyze and cue movement patterns, analyze and modify body mechanics/ergonomics and assess and modify postural abnormalities to attain remaining goals.    Progress toward goals / Updated goals:  Progressing to LTG 2: 5-115deg at end of session       PLAN  [x]  Upgrade activities as tolerated yes Continue plan of care   []  Discharge due to :    [x]  Other: Work on gait training without use of SC     Therapist: Mejia Ansari PT    Date: 12/9/2020 Time: 11:07 AM     Future Appointments   Date Time Provider Leanne Carson   12/9/2020 10:30 AM [address] [facility]   12/11/2020 11:00 AM Macarena Villatoro [facility]   12/16/2020 10:30 AM [facility] PT [place] [id number] [address]   12/18/2020 10:15 AM [address]   12/21/2020 10:30 AM [facility] PT [place] [address] [address]   12/24/2020 10:00 AM Javier Combs PTA [address]

## 2020-12-11 ENCOUNTER — HOSPITAL ENCOUNTER (OUTPATIENT)
Dept: PHYSICAL THERAPY | Age: 70
Discharge: HOME | End: 2020-12-11
Payer: MEDICARE

## 2020-12-11 PROCEDURE — 97110 THERAPEUTIC EXERCISES: CPT

## 2020-12-11 PROCEDURE — 97016 VASOPNEUMATIC DEVICE THERAPY: CPT

## 2020-12-11 PROCEDURE — 97140 MANUAL THERAPY 1/> REGIONS: CPT

## 2020-12-11 NOTE — PROGRESS NOTES
PHYSICAL THERAPY - DAILY TREATMENT NOTE    Patient Name: Lenard Arteaga        Date: 2020  : 1950   yes Patient  Verified  Visit #:      10  Insurance: Payor: Tez Laws / Plan: [place] MEDICARE PART A & B / Product Type: Medicare /      In time: 11 Out time: 1358   Total Treatment Time: 55     Medicare/BCBS Time Tracking (below)   Total Timed Codes (min):  55 1:1 Treatment Time:       TREATMENT AREA =  Right knee pain [M25.561]    SUBJECTIVE  Pain Level (on 0 to 10 scale):  3  / 10   Medication Changes/New allergies or changes in medical history, any new surgeries or procedures?    no  If yes, update Summary List   Subjective Functional Status/Changes:  []  No changes reported     I think it's coming along alright. OBJECTIVE  Modalities Rationale:     decrease inflammation and decrease pain to improve patient's ability to perform pain free ADLs. min [] Estim, type/location:                                      []  att     []  unatt     []  w/US     []  w/ice    []  w/heat    min []  Mechanical Traction: type/lbs                   []  pro   []  sup   []  int   []  cont    []  before manual    []  after manual    min []  Ultrasound, settings/location:      min []  Iontophoresis w/ dexamethasone, location:                                               []  take home patch       []  in clinic    min []  Ice     []  Heat    location/position:    10 min [x]  Vasopneumatic Device, press/temp: (R) knee elevated. min []  Other:    [x] Skin assessment post-treatment (if applicable):    [x]  intact    []  redness- no adverse reaction     []redness  adverse reaction:        35 min Therapeutic Exercise:  [x]  See flow sheet   Rationale:      increase ROM, increase strength, improve coordination and improve balance to improve the patients ability to perform pain free ADLs. 10 min Manual Therapy: STM/DTM (R) quad.     Rationale:      decrease pain, increase ROM, increase tissue extensibility and decrease trigger points to improve patient's ability to perform pain free ADLs. The manual therapy interventions were performed at a separate and distinct time from the therapeutic activities interventions. Billed With/As:   [x] TE   [] TA   [] Neuro   [] Self Care Patient Education: [x] Review HEP    [] Progressed/Changed HEP based on:   [] positioning   [] body mechanics   [] transfers   [] heat/ice application    [] other:      Other Objective/Functional Measures: Therex per flow sheet. Post Treatment Pain Level (on 0 to 10) scale:   2  / 10     ASSESSMENT  Assessment/Changes in Function:     Limited knee extension ROM. []  See Progress Note/Recertification   Patient will continue to benefit from skilled PT services to modify and progress therapeutic interventions, address functional mobility deficits, address ROM deficits, address strength deficits, analyze and address soft tissue restrictions, analyze and cue movement patterns, analyze and modify body mechanics/ergonomics and assess and modify postural abnormalities to attain remaining goals. Progress toward goals / Updated goals:    Good progress toward pain goals.       PLAN  [x]  Upgrade activities as tolerated yes Continue plan of care   []  Discharge due to :    []  Other:      Therapist: Sari Carr PTA    Date: 12/11/2020 Time: 11:08 AM     Future Appointments   Date Time Provider Leanne Carson   12/16/2020 10:30 AM [address] [facility]   12/18/2020 10:15 AM Varun Signs [address]   12/21/2020 10:30 AM [facility] PT [facility] 1 [facility]   12/24/2020 10:00 AM Mony Robles PTA [address]

## 2020-12-16 ENCOUNTER — HOSPITAL ENCOUNTER (OUTPATIENT)
Dept: PHYSICAL THERAPY | Age: 70
Discharge: HOME | End: 2020-12-16
Payer: MEDICARE

## 2020-12-16 PROCEDURE — 97110 THERAPEUTIC EXERCISES: CPT

## 2020-12-16 PROCEDURE — 97140 MANUAL THERAPY 1/> REGIONS: CPT

## 2020-12-16 PROCEDURE — 97016 VASOPNEUMATIC DEVICE THERAPY: CPT

## 2020-12-16 NOTE — PROGRESS NOTES
PHYSICAL THERAPY - DAILY TREATMENT NOTE    Patient Name: Char Kincaid        Date: 2020  : 1950   yes Patient  Verified  Visit #:   6  of   10  Insurance: Payor: Justine Negro / Plan: [place] MEDICARE PART A & B / Product Type: Medicare /      In time:  Out time: 46   Total Treatment Time: 53     Medicare/BCBS Time Tracking (below)   Total Timed Codes (min):  43 1:1 Treatment Time:  38     TREATMENT AREA =  Right knee pain [M25.561]    SUBJECTIVE  Pain Level (on 0 to 10 scale):  5 / 10   Medication Changes/New allergies or changes in medical history, any new surgeries or procedures?    no  If yes, update Summary List   Subjective Functional Status/Changes:  []  No changes reported   \"Its sore and stiff. \"       OBJECTIVE  Modalities Rationale:     decrease inflammation and decrease pain to improve patient's ability to perform pain free ADLs. min [] Estim, type/location:                                      []  att     []  unatt     []  w/US     []  w/ice    []  w/heat    min []  Mechanical Traction: type/lbs                   []  pro   []  sup   []  int   []  cont    []  before manual    []  after manual    min []  Ultrasound, settings/location:      min []  Iontophoresis w/ dexamethasone, location:                                               []  take home patch       []  in clinic    min []  Ice     []  Heat    location/position:    10 min [x]  Vasopneumatic Device, press/temp: (R) knee elevated. min []  Other:    [x] Skin assessment post-treatment (if applicable):    [x]  intact    []  redness- no adverse reaction     []redness  adverse reaction:        20/15 min Therapeutic Exercise:  [x]  See flow sheet   Rationale:      increase ROM, increase strength, improve coordination and improve balance to improve the patients ability to perform pain free ADLs.       23 min Manual Therapy: STM/DTM (R) quad; TPR B heads of gastrocs, HS bellies in prone with MF stretching in prone to HS/gastrocs to tolerance; grade III mobes into tibiofemoral extension to tolerance   Rationale:      decrease pain, increase ROM, increase tissue extensibility and decrease trigger points to improve patient's ability to perform pain free ADLs. The manual therapy interventions were performed at a separate and distinct time from the therapeutic activities interventions. Billed With/As:   [x] TE   [] TA   [] Neuro   [] Self Care Patient Education: [x] Review HEP    [] Progressed/Changed HEP based on:   [] positioning   [] body mechanics   [] transfers   [] heat/ice application    [] other:      Other Objective/Functional Measures:  -10 from TKE at start of session (AROM) and -5 at end of session  -3 deg PROM into TKE RLE at end of session     Post Treatment Pain Level (on 0 to 10) scale:   0  / 10     ASSESSMENT  Assessment/Changes in Function:   Pt with only -5 degrees from TKE at enf of session and -3 passively. Continue to focus on increasing TKE in standing. []  See Progress Note/Recertification   Patient will continue to benefit from skilled PT services to modify and progress therapeutic interventions, address functional mobility deficits, address ROM deficits, address strength deficits, analyze and address soft tissue restrictions, analyze and cue movement patterns, analyze and modify body mechanics/ergonomics and assess and modify postural abnormalities to attain remaining goals.    Progress toward goals / Updated goals:  Progressing to LTG #2 lacking 5deg from TKE RLE at end of session     PLAN  [x]  Upgrade activities as tolerated yes Continue plan of care   []  Discharge due to :    []  Other:      Therapist: Мария Nash, PT    Date: 12/16/2020 Time: 11:08 AM     Future Appointments   Date Time Provider Leanne Carson   12/16/2020 10:30 AM [address] [facility] [address] [facility] [address] [employer]   12/18/2020 10:15 AM Fer Bolden, PTA [address]   12/21/2020 10:30 AM [address] [facility] [address] [facility] [address]   12/24/2020 10:00 PARISH Larose, PTA [place] [id number]

## 2020-12-18 ENCOUNTER — HOSPITAL ENCOUNTER (OUTPATIENT)
Dept: PHYSICAL THERAPY | Age: 70
Discharge: HOME | End: 2020-12-18
Payer: MEDICARE

## 2020-12-18 PROCEDURE — 97016 VASOPNEUMATIC DEVICE THERAPY: CPT

## 2020-12-18 PROCEDURE — 97110 THERAPEUTIC EXERCISES: CPT

## 2020-12-18 PROCEDURE — 97140 MANUAL THERAPY 1/> REGIONS: CPT

## 2020-12-18 NOTE — PROGRESS NOTES
PHYSICAL THERAPY - DAILY TREATMENT NOTE    Patient Name: Emilee Toledo        Date: 2020  : 1950   yes Patient  Verified  Visit #:   7   of   10  Insurance: Payor: Chana Lorenz / Plan: [place] MEDICARE PART A & B / Product Type: Medicare /      In time: 35 Out time:    Total Treatment Time: 50     Medicare/BCBS Time Tracking (below)   Total Timed Codes (min):  50 1:1 Treatment Time:  50     TREATMENT AREA =  Right knee pain [M25.561]    SUBJECTIVE  Pain Level (on 0 to 10 scale):  5  / 10   Medication Changes/New allergies or changes in medical history, any new surgeries or procedures?    no  If yes, update Summary List   Subjective Functional Status/Changes:  []  No changes reported     The knee is really stiff. It takes me awhile to warm up. OBJECTIVE  Modalities Rationale:     decrease inflammation and decrease pain to improve patient's ability to perform pain free ADLs. min [] Estim, type/location:                                      []  att     []  unatt     []  w/US     []  w/ice    []  w/heat    min []  Mechanical Traction: type/lbs                   []  pro   []  sup   []  int   []  cont    []  before manual    []  after manual    min []  Ultrasound, settings/location:      min []  Iontophoresis w/ dexamethasone, location:                                               []  take home patch       []  in clinic    min []  Ice     []  Heat    location/position:    10 min [x]  Vasopneumatic Device, press/temp: (R) knee elevated. min []  Other:    [x] Skin assessment post-treatment (if applicable):    [x]  intact    []  redness- no adverse reaction     []redness  adverse reaction:        25 min Therapeutic Exercise:  [x]  See flow sheet   Rationale:      increase ROM, increase strength, improve coordination and improve balance to improve the patients ability to perform pain free ADLs. 15 Min Manual Therapy: (R) knee PROM.     Rationale:      decrease pain, increase ROM, increase tissue extensibility and decrease trigger points to improve patient's ability to perform pain free AdLs. The manual therapy interventions were performed at a separate and distinct time from the therapeutic activities interventions. Billed With/As:   [x] TE   [] TA   [] Neuro   [] Self Care Patient Education: [x] Review HEP    [] Progressed/Changed HEP based on:   [] positioning   [] body mechanics   [] transfers   [] heat/ice application    [] other:      Other Objective/Functional Measures: Therex per flow sheet. Post Treatment Pain Level (on 0 to 10) scale:   2  / 10     ASSESSMENT  Assessment/Changes in Function:     No exacerbation of symptoms with today's session. []  See Progress Note/Recertification   Patient will continue to benefit from skilled PT services to modify and progress therapeutic interventions, address functional mobility deficits, address ROM deficits, address strength deficits, analyze and address soft tissue restrictions, analyze and cue movement patterns, analyze and modify body mechanics/ergonomics and assess and modify postural abnormalities to attain remaining goals. Progress toward goals / Updated goals:    No change in progress toward LTG's with today's session.       PLAN  [x]  Upgrade activities as tolerated yes Continue plan of care   []  Discharge due to :    []  Other:      Therapist: Marilyn Barker PTA    Date: 12/18/2020 Time: 10:21 AM     Future Appointments   Date Time Provider Leanne Carson   12/21/2020 10:30 AM [address] [facility] [address] [facility]   12/24/2020 10:00 AM Katlin New PTA [place] [id number]

## 2020-12-21 ENCOUNTER — HOSPITAL ENCOUNTER (OUTPATIENT)
Dept: PHYSICAL THERAPY | Age: 70
Discharge: HOME | End: 2020-12-21
Payer: MEDICARE

## 2020-12-21 PROCEDURE — 97530 THERAPEUTIC ACTIVITIES: CPT

## 2020-12-21 PROCEDURE — 97016 VASOPNEUMATIC DEVICE THERAPY: CPT

## 2020-12-21 PROCEDURE — 97110 THERAPEUTIC EXERCISES: CPT

## 2020-12-21 NOTE — PROGRESS NOTES
[facility] PHYSICAL THERAPY  [address] [place] [address] [address],Aliza Sorensen, 70 [facility] - Phone: [phone]  Fax: [phone]    CONTINUED PLAN OF CARE/RECERTIFICATION FOR PHYSICAL THERAPY          Patient Name: Mckenna Lemons : 1950   Treatment/Medical Diagnosis: Right knee pain [M25.561]   Onset Date: 10/28/20    Referral Source: Imelda Reeves MD Start of Care [facility]): 20   Prior Hospitalization: See Medical History Provider #: [id number]   Prior Level of Function: I in functional ADLs/ambulation; reciprocal stair negotiation   Comorbidities: L TKR, OA, HTN, A-Fib, DM, latex allergy, heart disease   Medications: Verified on Patient Summary List   Visits from [facility]: 8 Missed Visits: 0     Goal/Measure of Progress Goal Met? 1. Pt will be compliant with HEP for symptom management at home and independent in such for self management at discharge. Status at last Eval: NA Current Status: Progressing Progressing   2. Pt will demonstrate an increased FOTO score to 46/100 in order to improve function   Status at last Eval: 29 Current Status: 43 Progressing   3. Pt will demonstrate increased right knee AROM to >/= 0-120deg in order to more easily ambulate and negotiate stairs   Status at last Eval: 10-90 Current Status: 5-120 Progressing   4. Pt will be able to ambulate 250ft continuously with LRAD without R knee pain/LOB/rest break in order to improve community ambulation     Status at last Eval: Short distances with [facility] Current Status: 250ft with SPC with pain Progressing   5.   Pt will demonstrate decreased edema in the right knee to within 1cm from that of the left knee in order to allow for improved mobility and ability to ambulate  Status at last eval: 47.5cm at superior patellar poles RLE    Current: 45.5cm         Met    SLS LLE 15sec RLE 3sec  R knee AROM 5-120/2-125 in supine  REC 30sec on foam    Key Functional Changes/Progress: able to ambulate without a SC, able to transfer more easily, increased mobility  Problem List: pain affecting function, decrease ROM, decrease strength, edema affecting function, impaired gait/ balance, decrease ADL/ functional abilitiies, decrease activity tolerance, decrease flexibility/ joint mobility and decrease transfer abilities   Treatment Plan may include any combination of the following: Therapeutic exercise, Therapeutic activities, Neuromuscular re-education, Physical agent/modality, Gait/balance training, Manual therapy, Patient education, Self Care training, Functional mobility training, Home safety training and Stair training  Patient Goal(s) has been updated and includes:  Walk better and decrease the pain   Goals for this certification period include and are to be achieved in   10  treatments:  1. Pt will be compliant with HEP for symptom management at home and independent in such for self management at discharge. 2. Pt will demonstrate an increased FOTO score to 46/100 in order to improve function  3. Pt will demonstrate increased right knee AROM to >/= 0-120deg in order to more easily ambulate and negotiate stairs  4. Pt will be able to ambulate 250ft continuously with LRAD without R knee pain/LOB/rest break in order to improve community ambulation  5. Pt will improve SLS on RLE to >/= 10sec in order to decrease risk of falls    Frequency / Duration:     Patient to be seen   2   times per week for   10    treatments:  G-Codes (GP):   Assessments/Recommendations: continue with POC to address remaining limitations and aid in maximizing  function for a safe return to community integration   If you have any questions/comments please contact us directly at [phone]. Thank you for allowing us to assist in the care of your patient.     Therapist Signature: Yuriy Peters PT Date: 73/43/7532   Certification Period:  Reporting Period: 11/23/20-2/22/21 11/23/20-12/21/20 Time: 88:98 AM     I certify that the above Physical Therapy Services are being furnished while the patient is under my care. I agree with the treatment plan and certify that this therapy is necessary. Physician Signature:        Date:       Time:     Please sign and return to [facility], [facility]. or you may fax the signed copy to [phone]. Thank you.    ___ I have read the above report and request that my patient continue as recommended.   ___ I have read the above report and request that my patient continue therapy with the following changes/special instructions: ________________________________________________   ___ I have read the above report and request that my patient be discharged from therapy.      Physician Signature:        Date:       Time:

## 2020-12-21 NOTE — PROGRESS NOTES
PHYSICAL THERAPY - DAILY TREATMENT NOTE    Patient Name: Felisha Marie        Date: 2020  : 1950   yes Patient  Verified  Visit #:   8  of   10  Insurance: Payor: Yasmine Chris / Plan: [place] MED[place]ARE PART A & B / Product Type: Medicare /      In time: 9315 Out time:    Total Treatment Time: 60     Medicare/BCBS Time Tracking (below)   Total treatment time: 60 1:1 Treatment Time:  45     TREATMENT AREA =  Right knee pain [M25.561]    SUBJECTIVE  Pain Level (on 0 to 10 scale):  5  / 10   Medication Changes/New allergies or changes in medical history, any new surgeries or procedures?    no  If yes, update Summary List   Subjective Functional Status/Changes:  []  No changes reported     RECERT       OBJECTIVE  Modalities Rationale:     decrease inflammation and decrease pain to improve patient's ability to perform pain free ADLs. min [] Estim, type/location:                                      []  att     []  unatt     []  w/US     []  w/ice    []  w/heat    min []  Mechanical Traction: type/lbs                   []  pro   []  sup   []  int   []  cont    []  before manual    []  after manual    min []  Ultrasound, settings/location:      min []  Iontophoresis w/ dexamethasone, location:                                               []  take home patch       []  in clinic    min []  Ice     []  Heat    location/position:    10 min [x]  Vasopneumatic Device, press/temp: (R) knee elevated. min []  Other:    [x] Skin assessment post-treatment (if applicable):    [x]  intact    []  redness- no adverse reaction     []redness  adverse reaction:        40/35 min Therapeutic Exercise:  [x]  See flow sheet   Rationale:      increase ROM, increase strength, improve coordination and improve balance to improve the patients ability to perform pain free ADLs.       10 min Therapeutic Activity: [x]  See flow sheet  Body part R knee  Re-assessment of R knee mobility/strength/edema  Balance assessment; continued instruction in HEP   Rationale:    increase ROM, increase strength and improve balance to improve the patients ability to ambulate and transfer    Billed With/As:   [] TE   [x] TA   [] Neuro   [] Self Care Patient Education: [x] Review HEP    [] Progressed/Changed HEP based on:   [x] positioning   [x] body mechanics   [x] transfers   [x] heat/ice application    [x] other: prone knee extension hangs     Other Objective/Functional Measures:    See recert     Post Treatment Pain Level (on 0 to 10) scale:   2  / 10     ASSESSMENT  Assessment/Changes in Function:     No exacerbation of symptoms with today's session. See RECERT     []  See Progress Note/Recertification   Patient will continue to benefit from skilled PT services to modify and progress therapeutic interventions, address functional mobility deficits, address ROM deficits, address strength deficits, analyze and address soft tissue restrictions, analyze and cue movement patterns, analyze and modify body mechanics/ergonomics and assess and modify postural abnormalities to attain remaining goals.    Progress toward goals / Updated goals:    See RECERT for updated goals     PLAN  [x]  Upgrade activities as tolerated yes Continue plan of care   []  Discharge due to :    []  Other:      Therapist: Janee Patient, PT    Date: 12/21/2020 Time: 10:21 AM     Future Appointments   Date Time Provider Leanne Carson   12/24/2020 10:00 AM Chinmay Dupont PTA [place] [address]

## 2020-12-24 ENCOUNTER — HOSPITAL ENCOUNTER (OUTPATIENT)
Dept: PHYSICAL THERAPY | Age: 70
Discharge: HOME | End: 2020-12-24
Payer: MEDICARE

## 2020-12-24 PROCEDURE — 97140 MANUAL THERAPY 1/> REGIONS: CPT

## 2020-12-24 PROCEDURE — 97016 VASOPNEUMATIC DEVICE THERAPY: CPT

## 2020-12-24 PROCEDURE — 97110 THERAPEUTIC EXERCISES: CPT

## 2020-12-24 NOTE — PROGRESS NOTES
PHYSICAL THERAPY - DAILY TREATMENT NOTE    Patient Name: Mohinder Sexton        Date: 2020  : 1950   yes Patient  Verified  Visit #:     Insurance: Payor: Samanta Bassett / Plan: [place] MEDICARE PART A & B / Product Type: Medicare /      In time: 10 Out time: 11   Total Treatment Time: 60     Medicare/BCBS Time Tracking (below)   Total Timed Codes (min):  50 1:1 Treatment Time:       TREATMENT AREA =  Right knee pain [M25.561]    SUBJECTIVE  Pain Level (on 0 to 10 scale):  5  / 10   Medication Changes/New allergies or changes in medical history, any new surgeries or procedures?    no  If yes, update Summary List   Subjective Functional Status/Changes:  []  No changes reported     It hurts the most when I first get up and it's stiff. OBJECTIVE  Modalities Rationale:     decrease inflammation and decrease pain to improve patient's ability to perform pain free ADLs. min [] Estim, type/location:                                      []  att     []  unatt     []  w/US     []  w/ice    []  w/heat    min []  Mechanical Traction: type/lbs                   []  pro   []  sup   []  int   []  cont    []  before manual    []  after manual    min []  Ultrasound, settings/location:      min []  Iontophoresis w/ dexamethasone, location:                                               []  take home patch       []  in clinic    min []  Ice     []  Heat    location/position:    10 min [x]  Vasopneumatic Device, press/temp: Supine, (R) knee. 36 deg.     min []  Other:    [x] Skin assessment post-treatment (if applicable):    [x]  intact    []  redness- no adverse reaction     []redness  adverse reaction:        30 min Therapeutic Exercise:  [x]  See flow sheet   Rationale:      increase ROM, increase strength, improve coordination and improve balance to improve the patients ability to ambulate. 20 Min Manual Therapy: (R) knee PROM.     Rationale:      decrease pain, increase ROM, increase tissue extensibility and decrease trigger points to improve patient's ability to perform pain free ADLs. The manual therapy interventions were performed at a separate and distinct time from the therapeutic activities interventions. Billed With/As:   [x] TE   [] TA   [] Neuro   [] Self Care Patient Education: [x] Review HEP    [] Progressed/Changed HEP based on:   [] positioning   [] body mechanics   [] transfers   [] heat/ice application    [] other:      Other Objective/Functional Measures: Therex per flow sheet. Post Treatment Pain Level (on 0 to 10) scale:   0   / 10     ASSESSMENT  Assessment/Changes in Function:     No exacerbation of symptoms with today's session. []  See Progress Note/Recertification   Patient will continue to benefit from skilled PT services to modify and progress therapeutic interventions, address functional mobility deficits, address ROM deficits, address strength deficits, analyze and address soft tissue restrictions, analyze and cue movement patterns, analyze and modify body mechanics/ergonomics, assess and modify postural abnormalities and address imbalance/dizziness to attain remaining goals. Progress toward goals / Updated goals:    No change in progress toward LTG's with today's session.       PLAN  [x]  Upgrade activities as tolerated yes Continue plan of care   []  Discharge due to :    []  Other:      Therapist: Niall Pride PTA    Date: 12/24/2020 Time: 10:11 AM     Future Appointments   Date Time Provider Leanne Carson   1/6/2021  9:30 AM [facility]   1/8/2021  9:30 AM Percy Ying PTA [place] [address]   1/11/2021 10:30 AM [address] [facility]   1/13/2021 10:30 AM [address] [address] [facility]   1/18/2021 11:00 AM [facility]   1/20/2021 11:45 AM [facility]   1/27/2021 10:30 AM [facility] [facility]   1/29/2021 11:45 AM Percy Ying, [place]

## 2021-01-06 ENCOUNTER — HOSPITAL ENCOUNTER (OUTPATIENT)
Dept: PHYSICAL THERAPY | Age: 71
Discharge: HOME | End: 2021-01-06
Payer: MEDICARE

## 2021-01-06 PROCEDURE — 97140 MANUAL THERAPY 1/> REGIONS: CPT

## 2021-01-06 PROCEDURE — 97016 VASOPNEUMATIC DEVICE THERAPY: CPT

## 2021-01-06 PROCEDURE — 97110 THERAPEUTIC EXERCISES: CPT

## 2021-01-06 NOTE — PROGRESS NOTES
PHYSICAL THERAPY - DAILY TREATMENT NOTE    Patient Name: Ian Ornelas        Date: 2021  : 1950   yes Patient  Verified  Visit #:   10   of   18  Insurance: Payor: Kitty Hazard / Plan: [place] MEDICARE PART A & B / Product Type: Medicare /      In time: 930 Out time: 1020   Total Treatment Time: 50     Medicare/BCBS Time Tracking (below)   Total Timed Codes (min):  50 1:1 Treatment Time:  50     TREATMENT AREA =  Right knee pain [M25.561]    SUBJECTIVE  Pain Level (on 0 to 10 scale):  1-3  / 10   Medication Changes/New allergies or changes in medical history, any new surgeries or procedures?    no  If yes, update Summary List   Subjective Functional Status/Changes:  []  No changes reported     It's a little stiff but not too bad today. OBJECTIVE  Modalities Rationale:     decrease edema, decrease inflammation and decrease pain to improve patient's ability to perform pain free ADLs. min [] Estim, type/location:                                      []  att     []  unatt     []  w/US     []  w/ice    []  w/heat    min []  Mechanical Traction: type/lbs                   []  pro   []  sup   []  int   []  cont    []  before manual    []  after manual    min []  Ultrasound, settings/location:      min []  Iontophoresis w/ dexamethasone, location:                                               []  take home patch       []  in clinic    min []  Ice     []  Heat    location/position:    10 min [x]  Vasopneumatic Device, press/temp: Supine, (R) knee, 36 deg.     min []  Other:    [x] Skin assessment post-treatment (if applicable):    [x]  intact    []  redness- no adverse reaction     []redness  adverse reaction:        30 min Therapeutic Exercise:  [x]  See flow sheet   Rationale:      increase ROM, increase strength, improve coordination and improve balance to improve the patients ability to ambulate. 10 min Manual Therapy: (R) knee ext w/ manual OP.     Rationale:      decrease pain, increase ROM, increase tissue extensibility and decrease trigger points to improve patient's ability to perform pain free ADLs. The manual therapy interventions were performed at a separate and distinct time from the therapeutic activities interventions. Billed With/As:   [x] TE   [] TA   [] Neuro   [] Self Care Patient Education: [x] Review HEP    [] Progressed/Changed HEP based on:   [] positioning   [] body mechanics   [] transfers   [] heat/ice application    [] other:      Other Objective/Functional Measures: Therex per flow sheet. Post Treatment Pain Level (on 0 to 10) scale:   1  / 10     ASSESSMENT  Assessment/Changes in Function:     Pt demonstrating improvement in gait and strength. []  See Progress Note/Recertification   Patient will continue to benefit from skilled PT services to modify and progress therapeutic interventions, address functional mobility deficits, address ROM deficits, address strength deficits, analyze and address soft tissue restrictions, analyze and cue movement patterns, analyze and modify body mechanics/ergonomics and assess and modify postural abnormalities to attain remaining goals. Progress toward goals / Updated goals:    Progressing toward LTG #4.       PLAN  [x]  Upgrade activities as tolerated yes Continue plan of care   []  Discharge due to :    []  Other:      Therapist: Keara Crenshaw PTA    Date: 1/6/2021 Time: 9:32 AM     Future Appointments   Date Time Provider Leanne Carson   1/8/2021  3:00 PM [facility]   1/11/2021 10:30 AM [address] [place] [facility]   1/13/2021 10:30 AM [address] [place] [address] [facility]   1/18/2021 11:00 AM [facility]   1/20/2021 11:45 AM [facility]   1/27/2021 10:30 AM [facility] PT [address] [facility]   1/29/2021 11:45 AM [facility]

## 2021-01-08 ENCOUNTER — HOSPITAL ENCOUNTER (OUTPATIENT)
Dept: PHYSICAL THERAPY | Age: 71
Discharge: HOME | End: 2021-01-08
Payer: MEDICARE

## 2021-01-08 ENCOUNTER — APPOINTMENT (OUTPATIENT)
Dept: PHYSICAL THERAPY | Age: 71
End: 2021-01-08
Payer: MEDICARE

## 2021-01-08 PROCEDURE — 97110 THERAPEUTIC EXERCISES: CPT

## 2021-01-08 PROCEDURE — 97016 VASOPNEUMATIC DEVICE THERAPY: CPT

## 2021-01-08 PROCEDURE — 97140 MANUAL THERAPY 1/> REGIONS: CPT

## 2021-01-08 NOTE — PROGRESS NOTES
PHYSICAL THERAPY - DAILY TREATMENT NOTE    Patient Name: Sasha García        Date: 2021  : 1950   yes Patient  Verified  Visit #:     Insurance: Payor: Pedro Moulton / Plan: [place] MEDICARE PART A & B / Product Type: Medicare /      In time: 130 Out time: 215   Total Treatment Time: 45     Medicare/BCBS Time Tracking (below)   Total Timed Codes (min):  45 1:1 Treatment Time:  45     TREATMENT AREA =  Right knee pain [M25.561]    SUBJECTIVE  Pain Level (on 0 to 10 scale):  3  / 10   Medication Changes/New allergies or changes in medical history, any new surgeries or procedures?    no  If yes, update Summary List   Subjective Functional Status/Changes:  []  No changes reported     It's about the same. OBJECTIVE  Modalities Rationale:     decrease edema, decrease inflammation and decrease pain to improve patient's ability to perform pain free ADLs. min [] Estim, type/location:                                      []  att     []  unatt     []  w/US     []  w/ice    []  w/heat    min []  Mechanical Traction: type/lbs                   []  pro   []  sup   []  int   []  cont    []  before manual    []  after manual    min []  Ultrasound, settings/location:      min []  Iontophoresis w/ dexamethasone, location:                                               []  take home patch       []  in clinic    min []  Ice     []  Heat    location/position:    10 min [x]  Vasopneumatic Device, press/temp: Supine, (R) knee. min []  Other:    [x] Skin assessment post-treatment (if applicable):    [x]  intact    []  redness- no adverse reaction     []redness  adverse reaction:        20 min Therapeutic Exercise:  [x]  See flow sheet   Rationale:      increase strength, improve coordination and improve balance to improve the patients ability to perform pain free ADLs. 15 min Manual Therapy: (R) knee PROM. STM/DTM (R) quad.     Rationale:      decrease pain, increase ROM, increase tissue extensibility and decrease trigger points to improve patient's ability to perform pain free ADLs. The manual therapy interventions were performed at a separate and distinct time from the therapeutic activities interventions. Billed With/As:   [x] TE   [] TA   [] Neuro   [] Self Care Patient Education: [x] Review HEP    [] Progressed/Changed HEP based on:   [] positioning   [] body mechanics   [] transfers   [] heat/ice application    [] other:      Other Objective/Functional Measures: Therex per flow sheet. Post Treatment Pain Level (on 0 to 10) scale:   1  / 10     ASSESSMENT  Assessment/Changes in Function:     No exacerbation of symptoms with today's session. []  See Progress Note/Recertification   Patient will continue to benefit from skilled PT services to modify and progress therapeutic interventions, address functional mobility deficits, address ROM deficits, address strength deficits, analyze and address soft tissue restrictions, analyze and cue movement patterns, analyze and modify body mechanics/ergonomics, assess and modify postural abnormalities and address imbalance/dizziness to attain remaining goals. Progress toward goals / Updated goals:    No change in progress toward LTG's with today's session.       PLAN  [x]  Upgrade activities as tolerated yes Continue plan of care   []  Discharge due to :    []  Other:      Therapist: Audelia Carrillo PTA    Date: 1/8/2021 Time: 1:30 PM     Future Appointments   Date Time Provider Leanne Carson   1/11/2021 10:30 AM [address] [place] [address] [place] [address] [address]   1/13/2021 10:30 AM [address] [place] [address] [address]   1/18/2021 11:00 AM Tate L[facility] [address]   1/20/2021 11:45 AM Tate Lolling [facility] [address]   1/27/2021 10:30 AM [address]   1/29/2021 11:45 AM Nay Daor, PTA [place] [address]

## 2021-01-11 ENCOUNTER — HOSPITAL ENCOUNTER (OUTPATIENT)
Dept: PHYSICAL THERAPY | Age: 71
Discharge: HOME | End: 2021-01-11
Payer: MEDICARE

## 2021-01-11 PROCEDURE — 97016 VASOPNEUMATIC DEVICE THERAPY: CPT

## 2021-01-11 PROCEDURE — 97140 MANUAL THERAPY 1/> REGIONS: CPT

## 2021-01-11 PROCEDURE — 97110 THERAPEUTIC EXERCISES: CPT

## 2021-01-11 NOTE — PROGRESS NOTES
PHYSICAL THERAPY - DAILY TREATMENT NOTE    Patient Name: Ness Adame        Date: 2021  : 1950   yes Patient  Verified  Visit #:     Insurance: Payor: Beny Virgilioi / Plan: [place] MEDICARE PART A & B / Product Type: Medicare /      In time: 247 Out time: 44   Total Treatment Time: 60     Medicare/BCBS Time Tracking (below)   Total Timed Codes (min):  45 1:1 Treatment Time:  45     TREATMENT AREA =  Right knee pain [M25.561]    SUBJECTIVE  Pain Level (on 0 to 10 scale):  3  / 10   Medication Changes/New allergies or changes in medical history, any new surgeries or procedures?    no  If yes, update Summary List   Subjective Functional Status/Changes:  []  No changes reported     I feel like its getting stronger. OBJECTIVE  Modalities Rationale:     decrease edema, decrease inflammation and decrease pain to improve patient's ability to perform pain free ADLs. min [] Estim, type/location:                                      []  att     []  unatt     []  w/US     []  w/ice    []  w/heat    min []  Mechanical Traction: type/lbs                   []  pro   []  sup   []  int   []  cont    []  before manual    []  after manual    min []  Ultrasound, settings/location:      min []  Iontophoresis w/ dexamethasone, location:                                               []  take home patch       []  in clinic    min []  Ice     []  Heat    location/position:    15 min [x]  Vasopneumatic Device, press/temp: Supine, (R) knee. min []  Other:    [x] Skin assessment post-treatment (if applicable):    [x]  intact    []  redness- no adverse reaction     []redness  adverse reaction:        30 min Therapeutic Exercise:  [x]  See flow sheet   Rationale:      increase strength, improve coordination and improve balance to improve the patients ability to perform pain free ADLs. 15 min Manual Therapy: (R) knee PROM.   STM/DTM (R) quad, medial PF glides to tolerance    Rationale: decrease pain, increase ROM, increase tissue extensibility and decrease trigger points to improve patient's ability to perform pain free ADLs. The manual therapy interventions were performed at a separate and distinct time from the therapeutic activities interventions. Billed With/As:   [x] TE   [] TA   [] Neuro   [] Self Care Patient Education: [x] Review HEP    [] Progressed/Changed HEP based on:   [] positioning   [] body mechanics   [] transfers   [] heat/ice application    [] other:      Other Objective/Functional Measures: Therex per flow sheet. Post Treatment Pain Level (on 0 to 10) scale:   0  / 10     ASSESSMENT  Assessment/Changes in Function:   Pt with improved R knee flexion during swing phase of gait. []  See Progress Note/Recertification   Patient will continue to benefit from skilled PT services to modify and progress therapeutic interventions, address functional mobility deficits, address ROM deficits, address strength deficits, analyze and address soft tissue restrictions, analyze and cue movement patterns, analyze and modify body mechanics/ergonomics, assess and modify postural abnormalities and address imbalance/dizziness to attain remaining goals.    Progress toward goals / Updated goals:  Progressing to LTG #4     PLAN  [x]  Upgrade activities as tolerated yes Continue plan of care   []  Discharge due to :    []  Other:      Therapist: Nikos Burroughs PT    Date: 1/11/2021 Time: 1:30 PM     Future Appointments   Date Time Provider Leanne Carson   1/13/2021 10:30 AM 1000 [facility] 1 [facility]   1/18/2021 11:00 AM [facility]   1/20/2021 11:45 AM [facility] [place]   1/27/2021 10:30 AM [facility] [place] [facility]   1/29/2021 11:45 AM Kaylen Payton PTA [address]

## 2021-01-13 ENCOUNTER — HOSPITAL ENCOUNTER (OUTPATIENT)
Dept: PHYSICAL THERAPY | Age: 71
Discharge: HOME | End: 2021-01-13
Payer: MEDICARE

## 2021-01-13 PROCEDURE — 97110 THERAPEUTIC EXERCISES: CPT

## 2021-01-13 PROCEDURE — 97140 MANUAL THERAPY 1/> REGIONS: CPT

## 2021-01-13 NOTE — PROGRESS NOTES
PHYSICAL THERAPY - DAILY TREATMENT NOTE    Patient Name: Janna Salcedo        Date: 2021  : 1950   yes Patient  Verified  Visit #:   15  of   18  Insurance: Payor: Mary Duarte / Plan: [place] MEDICARE PART A & B / Product Type: Medicare /      In time: 5874 Out time: 5508   Total Treatment Time: 60     Medicare/BCBS Time Tracking (below)   Total Timed Codes (min):  45 1:1 Treatment Time:  40     TREATMENT AREA =  Right knee pain [M25.561]    SUBJECTIVE  Pain Level (on 0 to 10 scale):    / 10   Medication Changes/New allergies or changes in medical history, any new surgeries or procedures?    no  If yes, update Summary List   Subjective Functional Status/Changes:  []  No changes reported     I feel like its getting stronger. OBJECTIVE  Modalities Rationale:     decrease edema, decrease inflammation and decrease pain to improve patient's ability to perform pain free ADLs. min [] Estim, type/location:                                      []  att     []  unatt     []  w/US     []  w/ice    []  w/heat    min []  Mechanical Traction: type/lbs                   []  pro   []  sup   []  int   []  cont    []  before manual    []  after manual    min []  Ultrasound, settings/location:      min []  Iontophoresis w/ dexamethasone, location:                                               []  take home patch       []  in clinic   15 min [x]  Ice     []  Heat    location/position: R knee    min [x]  Vasopneumatic Device, press/temp: Supine, (R) knee. min []  Other:    [x] Skin assessment post-treatment (if applicable):    [x]  intact    []  redness- no adverse reaction     []redness  adverse reaction:        30/25 min Therapeutic Exercise:  [x]  See flow sheet   Rationale:      increase strength, improve coordination and improve balance to improve the patients ability to perform pain free ADLs. 15 min Manual Therapy: (R) knee PROM.   STM/DTM (R) quad, medial PF glides to tolerance    Rationale: decrease pain, increase ROM, increase tissue extensibility and decrease trigger points to improve patient's ability to perform pain free ADLs. The manual therapy interventions were performed at a separate and distinct time from the therapeutic activities interventions. Billed With/As:   [x] TE   [] TA   [] Neuro   [] Self Care Patient Education: [x] Review HEP    [] Progressed/Changed HEP based on:   [] positioning   [] body mechanics   [] transfers   [] heat/ice application    [] other:      Other Objective/Functional Measures: Added mini lunge in // bars, bosu step ups with B UE support, SB wall squats, and mini goblet squats     Post Treatment Pain Level (on 0 to 10) scale:   0  / 10     ASSESSMENT  Assessment/Changes in Function:   Pt with improved R knee flexion during swing phase of gait. Continues to have steppage gait on TB requiring VCs. No pain with therex added. []  See Progress Note/Recertification   Patient will continue to benefit from skilled PT services to modify and progress therapeutic interventions, address functional mobility deficits, address ROM deficits, address strength deficits, analyze and address soft tissue restrictions, analyze and cue movement patterns, analyze and modify body mechanics/ergonomics, assess and modify postural abnormalities and address imbalance/dizziness to attain remaining goals.    Progress toward goals / Updated goals:  Progressing to LTG #4    recert NV     PLAN  [x]  Upgrade activities as tolerated yes Continue plan of care   []  Discharge due to :    [x]  Other: PN NV     Therapist: Stephany Talamantes PT    Date: 1/13/2021 Time: 1:30 PM     Future Appointments   Date Time Provider Leanne Carson   1/18/2021 11:00 AM [facility]   1/20/2021 11:45 AM [facility]   1/27/2021 10:30 AM [facility] PT [address] [facility]   1/29/2021 11:45 AM Tristin Rudd, PTA [address]

## 2021-01-18 ENCOUNTER — HOSPITAL ENCOUNTER (OUTPATIENT)
Dept: PHYSICAL THERAPY | Age: 71
Discharge: HOME | End: 2021-01-18
Payer: MEDICARE

## 2021-01-18 PROCEDURE — 97140 MANUAL THERAPY 1/> REGIONS: CPT

## 2021-01-18 PROCEDURE — 97016 VASOPNEUMATIC DEVICE THERAPY: CPT

## 2021-01-18 PROCEDURE — 97110 THERAPEUTIC EXERCISES: CPT

## 2021-01-18 NOTE — PROGRESS NOTES
PHYSICAL THERAPY - DAILY TREATMENT NOTE    Patient Name: Yesenia Ornelas        Date: 2021  : 1950   yes Patient  Verified  Visit #:   15   of   18  Insurance: Payor: Leonardo Weeks / Plan: [place] MEDICARE PART A & B / Product Type: Medicare /      In time:  Out time: 7931   Total Treatment Time: 60     Medicare/BCBS Time Tracking (below)   Total Timed Codes (min):  60 1:1 Treatment Time:  60     TREATMENT AREA =  Right knee pain [M25.561]    SUBJECTIVE  Pain Level (on 0 to 10 scale):  4  / 10   Medication Changes/New allergies or changes in medical history, any new surgeries or procedures?    no  If yes, update Summary List   Subjective Functional Status/Changes:  []  No changes reported     Pt reporting recent max pain of 5/10 with 75% improvement in symptoms since beginning PT.         OBJECTIVE  Modalities Rationale:     decrease edema, decrease inflammation and decrease pain to improve patient's ability to perform pain free ADLs. min [] Estim, type/location:                                      []  att     []  unatt     []  w/US     []  w/ice    []  w/heat    min []  Mechanical Traction: type/lbs                   []  pro   []  sup   []  int   []  cont    []  before manual    []  after manual    min []  Ultrasound, settings/location:      min []  Iontophoresis w/ dexamethasone, location:                                               []  take home patch       []  in clinic    min []  Ice     []  Heat    location/position:    10 min [x]  Vasopneumatic Device, press/temp: Supine, (R) knee. min []  Other:    [x] Skin assessment post-treatment (if applicable):    [x]  intact    []  redness- no adverse reaction     []redness  adverse reaction:        35 min Therapeutic Exercise:  [x]  See flow sheet   Rationale:      increase ROM, increase strength, improve coordination and improve balance to improve the patients ability to ambulate. 15 min Manual Therapy: (R) knee PROM.     Rationale: decrease pain, increase ROM, increase tissue extensibility and decrease trigger points to improve patient's ability to perform pain free ADLs. The manual therapy interventions were performed at a separate and distinct time from the therapeutic activities interventions. Billed With/As:   [x] TE   [] TA   [] Neuro   [] Self Care Patient Education: [x] Review HEP    [] Progressed/Changed HEP based on:   [] positioning   [] body mechanics   [] transfers   [] heat/ice application    [] other:      Other Objective/Functional Measures:    FOTO = 46  GROC = +4     Knee AROM = -2 to 129 deg     Knee circumference measurements @ mid-patella: (R) 42.5 cm, (L) 42.25 cm    Pt able to ambulate 400' w/ AD, no rest breaks required. Post Treatment Pain Level (on 0 to 10) scale:   0   / 10     ASSESSMENT  Assessment/Changes in Function:     See PN     []  See Progress Note/Recertification   Patient will continue to benefit from skilled PT services to modify and progress therapeutic interventions, address functional mobility deficits, address ROM deficits, address strength deficits, analyze and address soft tissue restrictions, analyze and cue movement patterns, analyze and modify body mechanics/ergonomics and assess and modify postural abnormalities to attain remaining goals.    Progress toward goals / Updated goals:    See PN     PLAN  [x]  Upgrade activities as tolerated yes Continue plan of care   []  Discharge due to :    []  Other:      Therapist: Claude Estrada PTA    Date: 1/18/2021 Time: 11:00 AM     Future Appointments   Date Time Provider Leanne Carson   1/20/2021 11:45 AM [facility]   1/27/2021 10:30 AM [facility] PT [address] [facility]   1/29/2021 11:45 AM Deborra Lesch, PTA [place]

## 2021-01-18 NOTE — PROGRESS NOTES
[facility] PHYSICAL THERAPY   [facility] [address],[place] Sorensen, [facility] - Phone: [phone]  Fax: [phone]  CONTINUED PLAN OF CARE/RECERTIFICATION FOR PHYSICAL THERAPY          Patient Name: Mnih Christensen : 1950   Treatment/Medical Diagnosis: Right knee pain [M25.561]   Onset Date: 10/28/2020    Referral Source: Jayme Worley MD Start of Care [facility]): 2020   Prior Hospitalization: See Medical History Provider #: [id number]   Prior Level of Function: I in functional ADLs/ambulation; reciprocal stair negotiation   Comorbidities: L TKR, OA, HTN, A-Fib, DM, latex allergy, heart disease   Medications: Verified on Patient Summary List   Visits from [facility]: 14 Missed Visits: 0     Goal/Measure of Progress Goal Met? 1. Pt will be compliant with HEP for symptom management at home and independent in such for self management at discharge. Status at last Eval: Progressing Current Status: Compliant with HEP yes   2. Pt will demonstrate an increased FOTO score to 46/100 in order to improve function   Status at last Eval: 43 Current Status: 46 yes   3. Pt will demonstrate increased right knee AROM to >/= 0-120deg in order to more easily ambulate and negotiate stairs   Status at last Eval: 5-120 deg  Current Status: 2 - 129 deg  progressing     4. Pt will be able to ambulate 250ft continuously with LRAD without R knee pain/LOB/rest break in order to improve community ambulation   Status at last Eval: 250' w/ [facility] w/ p! Current Status: 400' w/o AD, no rest breaks yes   5. Pt will improve SLS on RLE to >/= 10sec in order to decrease risk of falls   Status at last Eval: 3\"  Current Status: 15\"  yes     Key Functional Changes/Progress: Pt presented to [facility] w/ recent max pain of 5/10 and 75% improvement in symptoms since beginning PT. Current FOTO = 46, GROC = +4. Knee AROM = -2 to 129 deg.   Knee circumference measurements @ mid-patella: (R) 42.5 cm, (L) 42.25 cm. Pt is able to ambulate 500' w/o AD, no rest break required. Pt is (I) and compliant with HEP exercises. Problem List: pain affecting function, decrease ROM, decrease strength, edema affecting function, impaired gait/ balance, decrease ADL/ functional abilitiies, decrease activity tolerance, decrease flexibility/ joint mobility and decrease transfer abilities   Treatment Plan may include any combination of the following: Therapeutic exercise, Therapeutic activities, Neuromuscular re-education, Physical agent/modality, Gait/balance training, Manual therapy, Patient education, Self Care training, Functional mobility training, Home safety training and Stair training  Patient Goal(s) has been updated and includes:      Goals for this certification period include and are to be achieved in  10 treatments  Pt will demonstrate increased right knee AROM to >/= 0-130deg in order to more easily ambulate and negotiate stairs  Pt will improve SLS on RLE to >/= 30sec in order to decrease risk of falls  Pt will be able to tolerate 10min on treadmill without rest break/pain/LOB in order to resume community walking program    Frequency / Duration:   Patient to be seen   2   times per week for  10 visits    Assessments/Recommendations: continue with POC to address remaining limitations and aid in maximizing  function for a safe return to community integration     If you have any questions/comments please contact us directly at [phone]. Thank you for allowing us to assist in the care of your patient.     Therapist Signature: Tisha Rodriguez PTA/ Letty Newman, PT, DPT   Date: 3/70/0871   Certification Period:  Reporting Period: 11/23/2020 - 2/22/2021 12/21/2020 - 1/18/2021 Time: 2:04 PM   NOTE TO PHYSICIAN:  PLEASE COMPLETE THE ORDERS BELOW AND FAX TO   [facility] Physical Therapy: [phone]  If you are unable to process this request in 24 hours please contact our office: [phone]    ___ I have read the above report and request that my patient continue as recommended.   ___ I have read the above report and request that my patient continue therapy with the following changes/special instructions: ________________________________________________   ___ I have read the above report and request that my patient be discharged from therapy.      Physician Signature:        Date:       Time:

## 2021-01-20 ENCOUNTER — HOSPITAL ENCOUNTER (OUTPATIENT)
Dept: PHYSICAL THERAPY | Age: 71
Discharge: HOME | End: 2021-01-20
Payer: MEDICARE

## 2021-01-20 PROCEDURE — 97016 VASOPNEUMATIC DEVICE THERAPY: CPT

## 2021-01-20 PROCEDURE — 97110 THERAPEUTIC EXERCISES: CPT

## 2021-01-20 NOTE — PROGRESS NOTES
PHYSICAL THERAPY - DAILY TREATMENT NOTE    Patient Name: Sasha García        Date: 2021  : 1950   yes Patient  Verified  Visit #:   15      18  Insurance: Payor: Pedro Moulton / Plan: [place] MEDICARE PART A & B / Product Type: Medicare /      In time: 4879 Out time:    Total Treatment Time: 55     Medicare/BCBS Time Tracking (below)   Total Timed Codes (min):  55 1:1 Treatment Time:       TREATMENT AREA =  Right knee pain [M25.561]    SUBJECTIVE  Pain Level (on 0 to 10 scale):  2  / 10   Medication Changes/New allergies or changes in medical history, any new surgeries or procedures?    no  If yes, update Summary List   Subjective Functional Status/Changes:  []  No changes reported     It's feeling pretty good today. OBJECTIVE  Modalities Rationale:     decrease edema, decrease inflammation and decrease pain to improve patient's ability to perform pain free ADLs. min [] Estim, type/location:                                      []  att     []  unatt     []  w/US     []  w/ice    []  w/heat    min []  Mechanical Traction: type/lbs                   []  pro   []  sup   []  int   []  cont    []  before manual    []  after manual    min []  Ultrasound, settings/location:      min []  Iontophoresis w/ dexamethasone, location:                                               []  take home patch       []  in clinic    min []  Ice     []  Heat    location/position:    10 min [x]  Vasopneumatic Device, press/temp: Supine, (R) knee. min []  Other:    [x] Skin assessment post-treatment (if applicable):    [x]  intact    []  redness- no adverse reaction     []redness  adverse reaction:        45 min Therapeutic Exercise:  [x]  See flow sheet   Rationale:      increase ROM, increase strength, improve coordination and improve balance to improve the patients ability to perform pain free ADLs.       Billed With/As:   [x] TE   [] TA   [] Neuro   [] Self Care Patient Education: [x] Review HEP    [] Progressed/Changed HEP based on:   [] positioning   [] body mechanics   [] transfers   [] heat/ice application    [] other:      Other Objective/Functional Measures: Therex per flow sheet. Post Treatment Pain Level (on 0 to 10) scale:   0   / 10     ASSESSMENT  Assessment/Changes in Function:     No exacerbation of symptoms with today's session. []  See Progress Note/Recertification   Patient will continue to benefit from skilled PT services to modify and progress therapeutic interventions, address functional mobility deficits, address ROM deficits, address strength deficits, analyze and address soft tissue restrictions, analyze and cue movement patterns, analyze and modify body mechanics/ergonomics and assess and modify postural abnormalities to attain remaining goals. Progress toward goals / Updated goals:    No change in progress toward LTG's with today's session.       PLAN  [x]  Upgrade activities as tolerated yes Continue plan of care   []  Discharge due to :    []  Other:      Therapist: Audelia Carrillo PTA    Date: 1/20/2021 Time: 11:48 AM     Future Appointments   Date Time Provider Leanne Carson   1/27/2021 10:30 AM 1000 [facility] [address] [facility]   1/29/2021 11:45 AM Nay Daor, PTA [place] [address]

## 2021-01-27 ENCOUNTER — HOSPITAL ENCOUNTER (OUTPATIENT)
Dept: PHYSICAL THERAPY | Age: 71
Discharge: HOME | End: 2021-01-27
Payer: MEDICARE

## 2021-01-27 PROCEDURE — 97140 MANUAL THERAPY 1/> REGIONS: CPT

## 2021-01-27 PROCEDURE — 97110 THERAPEUTIC EXERCISES: CPT

## 2021-01-27 NOTE — PROGRESS NOTES
PHYSICAL THERAPY - DAILY TREATMENT NOTE    Patient Name: Janna Salcedo        Date: 2021  : 1950   yes Patient  Verified  Visit #:   (16) 2   of   10  Insurance: Payor: Mary Duarte / Plan: [place] MEDICARE PART A & B / Product Type: Medicare /      In time: 2 Out time: 8216   Total Treatment Time: 55     Medicare/BCBS Time Tracking (below)   Total Timed Codes (min):  40 1:1 Treatment Time:  40     TREATMENT AREA =  Right knee pain [M25.561]    SUBJECTIVE  Pain Level (on 0 to 10 scale):  2  / 10   Medication Changes/New allergies or changes in medical history, any new surgeries or procedures?    no  If yes, update Summary List   Subjective Functional Status/Changes:  []  No changes reported   \"My knee is getting better. \"         OBJECTIVE  Modalities Rationale:     decrease edema, decrease inflammation and decrease pain to improve patient's ability to perform pain free ADLs. min [] Estim, type/location:                                      []  att     []  unatt     []  w/US     []  w/ice    []  w/heat    min []  Mechanical Traction: type/lbs                   []  pro   []  sup   []  int   []  cont    []  before manual    []  after manual    min []  Ultrasound, settings/location:      min []  Iontophoresis w/ dexamethasone, location:                                               []  take home patch       []  in clinic   15 min [x]  Ice     []  Heat    location/position:     min []  Vasopneumatic Device, press/temp:     min []  Other:    [x] Skin assessment post-treatment (if applicable):    [x]  intact    []  redness- no adverse reaction     []redness  adverse reaction:        30  Therapeutic Exercise:  [x]  See flow sheet; TM billed as walking evaluation performed with VCs for heel toe gait    Rationale:      increase ROM, increase strength, improve coordination and improve balance to improve the patients ability to perform pain free ADLs.     10 min Manual Therapy: Cranial PF glides to tolerance grade II and III and tibiofemoral glides into extension to tolerance   Rationale:      decrease pain and increase ROM to improve patient's ability to improve knee extension for ambulation  The manual interventions were performed at a separate and distinct time from the therapeutic activities interventions. Billed With/As:   [x] TE   [] TA   [] Neuro   [] Self Care Patient Education: [x] Review HEP    [] Progressed/Changed HEP based on:   [] positioning   [] body mechanics   [] transfers   [] heat/ice application    [x] other:HEP      Other Objective/Functional Measures: Added 10min TM as it is a LT goal: 2.0mph on 1.0 incline  LOB x1 with forward bharath negotiation in // bars (large, reciprocal)     Post Treatment Pain Level (on 0 to 10) scale:   0   / 10     ASSESSMENT  Assessment/Changes in Function:   2-120deg R knee AROM at end of session and 5-115 prior to therapy session. []  See Progress Note/Recertification   Patient will continue to benefit from skilled PT services to modify and progress therapeutic interventions, address functional mobility deficits, address ROM deficits, address strength deficits, analyze and address soft tissue restrictions, analyze and cue movement patterns, analyze and modify body mechanics/ergonomics and assess and modify postural abnormalities to attain remaining goals.    Progress toward goals / Updated goals:  Pt will demonstrate increased right knee AROM to >/= 0-130deg in order to more easily ambulate and negotiate stairs  Pt will improve SLS on RLE to >/= 30sec in order to decrease risk of falls  Pt will be able to tolerate 10min on treadmill without rest break/pain/LOB in order to resume community walking program-MET 1/27/21     PLAN  [x]  Upgrade activities as tolerated yes Continue plan of care   []  Discharge due to :    []  Other:      Therapist: Diallo Deleon PT    Date: 1/27/2021 Time: 11:48 AM     Future Appointments   Date Time Provider Leanne Carson   1/27/2021 10:30 AM [facility] [place] [facility] [address] [place] [facility]   1/29/2021 11:45 AM Samuel Roe, [place] [address]

## 2021-01-29 ENCOUNTER — HOSPITAL ENCOUNTER (OUTPATIENT)
Dept: PHYSICAL THERAPY | Age: 71
Discharge: HOME | End: 2021-01-29
Payer: MEDICARE

## 2021-01-29 PROCEDURE — 97110 THERAPEUTIC EXERCISES: CPT

## 2021-01-29 NOTE — PROGRESS NOTES
PHYSICAL THERAPY - DAILY TREATMENT NOTE    Patient Name: Luma Hughes        Date: 2021  : 1950   yes Patient  Verified  Visit #:   17 3   of   10  Insurance: Payor: Cristina Cr / Plan: [place] MED[place] PART A & B / Product Type: Medicare /      In time: 9605 Out time: 8033   Total Treatment Time: 50     Medicare/BCBS Time Tracking (below)   Total Timed Codes (min):  40 1:1 Treatment Time:  40     TREATMENT AREA =  Right knee pain [M25.561]    SUBJECTIVE  Pain Level (on 0 to 10 scale):  2  / 10   Medication Changes/New allergies or changes in medical history, any new surgeries or procedures?    no  If yes, update Summary List   Subjective Functional Status/Changes:  []  No changes reported     It's getting stronger everyday. OBJECTIVE  Modalities Rationale:     decrease inflammation and decrease pain to improve patient's ability to perform pain free ADLs. min [] Estim, type/location:                                      []  att     []  unatt     []  w/US     []  w/ice    []  w/heat    min []  Mechanical Traction: type/lbs                   []  pro   []  sup   []  int   []  cont    []  before manual    []  after manual    min []  Ultrasound, settings/location:      min []  Iontophoresis w/ dexamethasone, location:                                               []  take home patch       []  in clinic   10 min [x]  Ice     []  Heat    location/position: Supine, (R) knee. min []  Vasopneumatic Device, press/temp:     min []  Other:    [x] Skin assessment post-treatment (if applicable):    [x]  intact    []  redness- no adverse reaction     []redness  adverse reaction:        40 min Therapeutic Exercise:  [x]  See flow sheet   Rationale:      increase ROM, increase strength, improve coordination and improve balance to improve the patients ability to perform pain free ADLs.       Billed With/As:   [x] TE   [] TA   [] Neuro   [] Self Care Patient Education: [x] Review HEP    [] Progressed/Changed HEP based on:   [] positioning   [] body mechanics   [] transfers   [] heat/ice application    [] other:      Other Objective/Functional Measures: Therex per flow sheet. Post Treatment Pain Level (on 0 to 10) scale:   0   / 10     ASSESSMENT  Assessment/Changes in Function:     No exacerbation of symptoms with today's session. []  See Progress Note/Recertification   Patient will continue to benefit from skilled PT services to modify and progress therapeutic interventions, address functional mobility deficits, address ROM deficits, address strength deficits, analyze and address soft tissue restrictions, analyze and cue movement patterns, analyze and modify body mechanics/ergonomics and assess and modify postural abnormalities to attain remaining goals. Progress toward goals / Updated goals:    No change in progress toward LTG's with today's session.       PLAN  [x]  Upgrade activities as tolerated yes Continue plan of care   []  Discharge due to :    []  Other:      Therapist: Galileo Ray PTA    Date: 1/29/2021 Time: 1:30 PM     Future Appointments   Date Time Provider Leanne Carson   2/3/2021  9:15 AM Cm Lopez PTA [facility]   2/10/2021 10:45 AM Cm Lopez PTA [facility]   2/15/2021 11:00 AM Alice Chandra [facility]   2/17/2021 10:15 AM Shauna Armendariz PTA [facility]   2/22/2021 10:15 AM [address] [address]

## 2021-02-03 ENCOUNTER — HOSPITAL ENCOUNTER (OUTPATIENT)
Dept: PHYSICAL THERAPY | Age: 71
Discharge: HOME | End: 2021-02-03
Payer: MEDICARE

## 2021-02-03 PROCEDURE — 97110 THERAPEUTIC EXERCISES: CPT

## 2021-02-03 NOTE — PROGRESS NOTES
PHYSICAL THERAPY - DAILY TREATMENT NOTE    Patient Name: Irena Seo        Date: 2/3/2021  : 1950   yes Patient  Verified  Visit #:   (00) 4   of   10  Insurance: Payor: Mehreen Gabriele / Plan: [place] MEDICARE PART A & B / Product Type: Medicare /      In time: 023 Out time: 1005   Total Treatment Time: 55     Medicare/BCBS Time Tracking (below)   Total Timed Codes (min):  50 1:1 Treatment Time:  40     TREATMENT AREA =  Right knee pain [M25.561]    SUBJECTIVE  Pain Level (on 0 to 10 scale):  1  / 10   Medication Changes/New allergies or changes in medical history, any new surgeries or procedures?    no  If yes, update Summary List   Subjective Functional Status/Changes:  []  No changes reported     No new c/o. OBJECTIVE  Modalities Rationale:     decrease inflammation and decrease pain to improve patient's ability to perform pain free ADLs. min [] Estim, type/location:                                      []  att     []  unatt     []  w/US     []  w/ice    []  w/heat    min []  Mechanical Traction: type/lbs                   []  pro   []  sup   []  int   []  cont    []  before manual    []  after manual    min []  Ultrasound, settings/location:      min []  Iontophoresis w/ dexamethasone, location:                                               []  take home patch       []  in clinic    min [x]  Ice     []  Heat    location/position: Supine, (R) knee. min []  Vasopneumatic Device, press/temp:     min []  Other:    [x] Skin assessment post-treatment (if applicable):    [x]  intact    []  redness- no adverse reaction     []redness  adverse reaction:        40 min Therapeutic Exercise:  [x]  See flow sheet   Rationale:      increase ROM, increase strength, improve coordination and improve balance to improve the patients ability to perform pain free ADLs.        Billed With/As:   [x] TE   [] TA   [] Neuro   [] Self Care Patient Education: [x] Review HEP    [] Progressed/Changed HEP based on: [] positioning   [] body mechanics   [] transfers   [] heat/ice application    [] other:      Other Objective/Functional Measures: Therex per flow sheet. Post Treatment Pain Level (on 0 to 10) scale:   1  / 10     ASSESSMENT  Assessment/Changes in Function:     No exacerbation of symptoms with today's session. []  See Progress Note/Recertification   Patient will continue to benefit from skilled PT services to modify and progress therapeutic interventions, address functional mobility deficits, address ROM deficits, address strength deficits and analyze and cue movement patterns to attain remaining goals. Progress toward goals / Updated goals:    No change in progress toward LTG's with today's session.       PLAN  []  Upgrade activities as tolerated yes Continue plan of care   []  Discharge due to :    []  Other:      Therapist: Fili Montanez PTA    Date: 2/3/2021 Time: 6:26 AM     Future Appointments   Date Time Provider Leanne Carson   2/3/2021  9:15 AM Jyotsna Klein PTA [facility]   2/10/2021 10:45 AM Jyotsna Klein PTA [facility]   2/15/2021 11:00 AM Alessio Zarate [facility]   2/17/2021 10:15 AM Kenyan WichitaAUGUSTO [facility]   [facility]21 [facility]

## 2021-02-10 ENCOUNTER — HOSPITAL ENCOUNTER (OUTPATIENT)
Dept: PHYSICAL THERAPY | Age: 71
Discharge: HOME | End: 2021-02-10
Payer: MEDICARE

## 2021-02-10 PROCEDURE — 97110 THERAPEUTIC EXERCISES: CPT

## 2021-02-15 ENCOUNTER — HOSPITAL ENCOUNTER (OUTPATIENT)
Dept: PHYSICAL THERAPY | Age: 71
Discharge: HOME | End: 2021-02-15
Payer: MEDICARE

## 2021-02-15 PROCEDURE — 97110 THERAPEUTIC EXERCISES: CPT

## 2021-02-15 NOTE — PROGRESS NOTES
PHYSICAL THERAPY - DAILY TREATMENT NOTE    Patient Name: Talat Fiore        Date: 2/15/2021  : 1950   yes Patient  Verified  Visit #:    6   of   10  Insurance: Payor: Pretty Alcantaraiver / Plan: [place] MEDICARE PART A & B / Product Type: Medicare /      In time: 11 Out time:    Total Treatment Time: 45     Medicare/BCBS Time Tracking (below)   Total Timed Codes (min):  35 1:1 Treatment Time:  35     TREATMENT AREA =  Right knee pain [M25.561]    SUBJECTIVE  Pain Level (on 0 to 10 scale):  1  / 10   Medication Changes/New allergies or changes in medical history, any new surgeries or procedures?    no  If yes, update Summary List   Subjective Functional Status/Changes:  []  No changes reported     I'm getting some tingling and itching in the (R) lateral knee. OBJECTIVE  Modalities Rationale:     decrease inflammation and decrease pain to improve patient's ability to perform pain free ADLs. min [] Estim, type/location:                                      []  att     []  unatt     []  w/US     []  w/ice    []  w/heat    min []  Mechanical Traction: type/lbs                   []  pro   []  sup   []  int   []  cont    []  before manual    []  after manual    min []  Ultrasound, settings/location:      min []  Iontophoresis w/ dexamethasone, location:                                               []  take home patch       []  in clinic   10 min [x]  Ice     []  Heat    location/position: (R) knee, supine. min []  Vasopneumatic Device, press/temp:     min []  Other:    [x] Skin assessment post-treatment (if applicable):    [x]  intact    []  redness- no adverse reaction     []redness  adverse reaction:        35 min Therapeutic Exercise:  [x]  See flow sheet   Rationale:      increase ROM, increase strength, improve coordination and improve balance to improve the patients ability to perform pain free ADLs.       Billed With/As:   [x] TE   [] TA   [] Neuro   [] Self Care Patient Education: [x] Review HEP    [] Progressed/Changed HEP based on:   [] positioning   [] body mechanics   [] transfers   [] heat/ice application    [] other:      Other Objective/Functional Measures: Therex per flow sheet. Post Treatment Pain Level (on 0 to 10) scale:   0  / 10     ASSESSMENT  Assessment/Changes in Function:     No exacerbation of symptoms with today's session. []  See Progress Note/Recertification   Patient will continue to benefit from skilled PT services to modify and progress therapeutic interventions, address functional mobility deficits, address ROM deficits, address strength deficits, analyze and address soft tissue restrictions, analyze and cue movement patterns, analyze and modify body mechanics/ergonomics and assess and modify postural abnormalities to attain remaining goals. Progress toward goals / Updated goals:    No change in progress toward LTG's with today's session.       PLAN  [x]  Upgrade activities as tolerated yes Continue plan of care   []  Discharge due to :    []  Other:      Therapist: Lamont Ganser, PTA    Date: 2/15/2021 Time: 11:14 AM     Future Appointments   Date Time Provider Leanne Carson   2/17/2021 10:15 AM [facility]   2/22/2021 10:15 AM Lourdes Arellano PTA [address]

## 2021-02-17 ENCOUNTER — HOSPITAL ENCOUNTER (OUTPATIENT)
Dept: PHYSICAL THERAPY | Age: 71
Discharge: HOME | End: 2021-02-17
Payer: MEDICARE

## 2021-02-17 PROCEDURE — 97110 THERAPEUTIC EXERCISES: CPT

## 2021-02-17 NOTE — PROGRESS NOTES
PHYSICAL THERAPY - DAILY TREATMENT NOTE    Patient Name: Talat Fiore        Date: 2021  : 1950   yes Patient  Verified  Visit #:    6   of   10  Insurance: Payor: Pretty Alcantaraiver / Plan: [place] MEDICARE PART A & B / Product Type: Medicare /      In time: 8113 Out time: 802   Total Treatment Time: 45     Medicare/BCBS Time Tracking (below)   Total Timed Codes (min):  35 1:1 Treatment Time:       TREATMENT AREA =  Right knee pain [M25.561]    SUBJECTIVE  Pain Level (on 0 to 10 scale):  1  / 10   Medication Changes/New allergies or changes in medical history, any new surgeries or procedures?    no  If yes, update Summary List   Subjective Functional Status/Changes:  []  No changes reported     No new complaints. OBJECTIVE  Modalities Rationale:     decrease inflammation and decrease pain to improve patient's ability to perform pain free ADLs. min [] Estim, type/location:                                      []  att     []  unatt     []  w/US     []  w/ice    []  w/heat    min []  Mechanical Traction: type/lbs                   []  pro   []  sup   []  int   []  cont    []  before manual    []  after manual    min []  Ultrasound, settings/location:      min []  Iontophoresis w/ dexamethasone, location:                                               []  take home patch       []  in clinic   10 min [x]  Ice     []  Heat    location/position: (R) knee, supine. min []  Vasopneumatic Device, press/temp:     min []  Other:    [x] Skin assessment post-treatment (if applicable):    [x]  intact    []  redness- no adverse reaction     []redness - adverse reaction:        35 min Therapeutic Exercise:  [x]  See flow sheet   Rationale:      increase ROM, increase strength, improve coordination and improve balance to improve the patients ability to perform pain free ADLs.       Billed With/As:   [x] TE   [] TA   [] Neuro   [] Self Care Patient Education: [x] Review HEP    [] Progressed/Changed HEP based on:   [] positioning   [] body mechanics   [] transfers   [] heat/ice application    [] other:      Other Objective/Functional Measures: Therex per flow sheet. Post Treatment Pain Level (on 0 to 10) scale:   0   / 10     ASSESSMENT  Assessment/Changes in Function:     No exacerbation of symptoms with today's session. []  See Progress Note/Recertification   Patient will continue to benefit from skilled PT services to modify and progress therapeutic interventions, address functional mobility deficits, address ROM deficits, address strength deficits, analyze and address soft tissue restrictions, analyze and cue movement patterns, analyze and modify body mechanics/ergonomics and assess and modify postural abnormalities to attain remaining goals. Progress toward goals / Updated goals:    No change in progress toward LTG's with today's session.       PLAN  [x]  Upgrade activities as tolerated yes Continue plan of care   []  Discharge due to :    []  Other:      Therapist: Keara Pichardo PTA    Date: 2/17/2021 Time: 10:34 AM     Future Appointments   Date Time Provider Leanne Carson   2/22/2021 10:15 AM Roslyn Santacruz PTA [address]

## 2021-02-22 ENCOUNTER — APPOINTMENT (OUTPATIENT)
Dept: PHYSICAL THERAPY | Age: 71
End: 2021-02-22
Payer: MEDICARE

## 2021-05-04 ENCOUNTER — OFFICE VISIT (OUTPATIENT)
Dept: CARDIOLOGY CLINIC | Age: 71
End: 2021-05-04
Payer: MEDICARE

## 2021-05-04 VITALS
HEART RATE: 89 BPM | DIASTOLIC BLOOD PRESSURE: 91 MMHG | RESPIRATION RATE: 16 BRPM | BODY MASS INDEX: 32.29 KG/M2 | SYSTOLIC BLOOD PRESSURE: 130 MMHG | HEIGHT: 69 IN | WEIGHT: 218 LBS | OXYGEN SATURATION: 98 % | TEMPERATURE: 97.8 F

## 2021-05-04 DIAGNOSIS — I10 ESSENTIAL HYPERTENSION: ICD-10-CM

## 2021-05-04 DIAGNOSIS — I48.91 ATRIAL FIBRILLATION, UNSPECIFIED TYPE (HCC): Primary | ICD-10-CM

## 2021-05-04 DIAGNOSIS — E78.00 HYPERCHOLESTEREMIA: ICD-10-CM

## 2021-05-04 PROCEDURE — 99214 OFFICE O/P EST MOD 30 MIN: CPT | Performed by: INTERNAL MEDICINE

## 2021-05-04 PROCEDURE — 3017F COLORECTAL CA SCREEN DOC REV: CPT | Performed by: INTERNAL MEDICINE

## 2021-05-04 PROCEDURE — 1101F PT FALLS ASSESS-DOCD LE1/YR: CPT | Performed by: INTERNAL MEDICINE

## 2021-05-04 PROCEDURE — G8417 CALC BMI ABV UP PARAM F/U: HCPCS | Performed by: INTERNAL MEDICINE

## 2021-05-04 PROCEDURE — 93000 ELECTROCARDIOGRAM COMPLETE: CPT | Performed by: INTERNAL MEDICINE

## 2021-05-04 PROCEDURE — G8752 SYS BP LESS 140: HCPCS | Performed by: INTERNAL MEDICINE

## 2021-05-04 PROCEDURE — G8428 CUR MEDS NOT DOCUMENT: HCPCS | Performed by: INTERNAL MEDICINE

## 2021-05-04 PROCEDURE — G8510 SCR DEP NEG, NO PLAN REQD: HCPCS | Performed by: INTERNAL MEDICINE

## 2021-05-04 PROCEDURE — G8536 NO DOC ELDER MAL SCRN: HCPCS | Performed by: INTERNAL MEDICINE

## 2021-05-04 PROCEDURE — G8755 DIAS BP > OR = 90: HCPCS | Performed by: INTERNAL MEDICINE

## 2021-05-04 RX ORDER — CHLORTHALIDONE 25 MG/1
25 TABLET ORAL DAILY
Qty: 90 TAB | Refills: 3 | Status: SHIPPED | OUTPATIENT
Start: 2021-05-04 | End: 2021-11-09

## 2021-05-04 NOTE — PROGRESS NOTES
[facility]    Mr. Luiz Shearer is a 72 y.o. male with a history of diabetes, hypertension, hyperlipidemia and DJD, sleep apnea    Patient is here today for add-on appointment. He denies any symptoms related to atrial fibrillation. He denies any symptoms concerning for angina or heart failure. He continues to have lower extremity swelling. He is tolerating his cardiac medication without any problem. He denies any palpitation, presyncope or syncope. Past Medical History:   Diagnosis Date    Arrhythmia     Atrial Fib    Arthritis     Degenerative disc disease, lumbar     Diabetes ([address]     HLD (hyperlipidemia)     Hypertension     Intracranial hemorrhage, cerebellar ([address] 2018    Hypertensive crisis, non-traumatic    PPD positive, treated 2018    was treated    Sleep apnea     on cpap    Spondylosis of lumbar spine          Past Surgical History:   Procedure Laterality Date    COLONOSCOPY  10/13/2015 Return 10/14/2025    Dr. Sierra Cap; screening    HX COLONOSCOPY      HX KNEE ARTHROSCOPY Right 1986    right knee    HX KNEE REPLACEMENT Left 01/2020    HX OTHER SURGICAL      cyst removed from chin    HX WRIST FRACTURE TX Left     ORIF       Current Outpatient Medications   Medication Sig    apixaban (ELIQUIS) 5 mg tablet Take 1 Tab by mouth two (2) times a day.  aspirin delayed-release 81 mg tablet Take 1 Tab by mouth daily.  amLODIPine (NORVASC) 5 mg tablet Take 1 Tab by mouth daily.  atorvastatin (LIPITOR) 10 mg tablet Take 1 Tab by mouth daily.  losartan (COZAAR) 50 mg tablet Take 1 Tab by mouth daily. (Patient taking differently: Take 25 mg by mouth daily.)    metoprolol tartrate (LOPRESSOR) 25 mg tablet Take 1 Tab by mouth two (2) times a day.  oxyCODONE IR (ROXICODONE) 5 mg immediate release tablet Take 5 mg by mouth every eight (8) hours as needed for Pain.  1-2 tabs every 8 hours prn pain    acetaminophen (TYLENOL) 500 mg tablet Take 500 mg by mouth every six (6) hours as needed for Pain.  HYDROcodone-acetaminophen (NORCO) 5-325 mg per tablet Take  by mouth two (2) times daily as needed.  cetirizine (ZYRTEC) 10 mg tablet Take 1 Tab by mouth daily.  metFORMIN ER (GLUCOPHAGE XR) 500 mg tablet Take 2 tabs po daily with a meal (Patient taking differently: two (2) times a day.)    SITagliptin (JANUVIA) 50 mg tablet Take 1 Tab by mouth daily.  sildenafil citrate (VIAGRA) 100 mg tablet Take 1 Tab by mouth as needed (E.D.). Once daily    tamsulosin (FLOMAX) 0.4 mg capsule Take 1 Cap by mouth daily.  melatonin 5 mg tablet Take 5 mg by mouth nightly. No current facility-administered medications for this visit. Allergies and Sensitivities:  No Known Allergies    Family History:  Family History   Problem Relation Age of Onset    No Known Problems Mother     No Known Problems Father        Social History:  Social History     Tobacco Use    Smoking status: Never Smoker    Smokeless tobacco: Never Used   Substance Use Topics    Alcohol use: Yes     Alcohol/week: 1.7 standard drinks     Types: 2 Cans of beer per week    Drug use: No     He  reports that he has never smoked. He has never used smokeless tobacco.  He  reports current alcohol use of about 1.7 standard drinks of alcohol per week. Review of Systems:  Cardiac symptoms as noted above in HPI. All others negative.     Physical Exam:  BP Readings from Last 3 Encounters:   05/04/21 (!) 130/91   12/08/20 110/76   11/20/20 110/76         Pulse Readings from Last 3 Encounters:   05/04/21 89   11/20/20 75   11/19/20 (!) 104          Wt Readings from Last 3 Encounters:   05/04/21 218 lb (98.9 kg)   12/08/20 216 lb (98 kg)   11/18/20 216 lb 6.4 oz (98.2 kg)       Objective:   Vital Signs: (As obtained by patient/caregiver at home)  Visit Vitals  BP (!) 130/91 (BP 1 Location: Right arm, BP Patient Position: Sitting, BP Cuff Size: Adult)   Pulse 89   Temp 97.8 °F (36.6 °C) (Temporal)   Resp 16   Ht 5' 9\" (1.753 m)   Wt 218 lb (98.9 kg)   SpO2 98%   BMI 32.19 kg/m²      Constitutional: Oriented to person, place, and time. HENT: Head: Normocephalic and atraumatic. Neck: No JVD present. Cardiovascular: Regular rhythm. No murmur, gallop or rubs appreciated  Lung: Breath sounds normal. No respiratory distress. No ronchi or rales appreciated  Abdominal: No tenderness. No rebound and no guarding. Musculoskeletal: There is trace/1+ lower extremity edema. No cynosis  Lymphadenopathy:  No cervical or supraclavicular adenopathy appriciated. Review of Data  LABS:   Lab Results   Component Value Date/Time    Sodium 140 10/23/2020 01:31 PM    Potassium 4.0 10/23/2020 01:31 PM    Chloride 104 10/23/2020 01:31 PM    CO2 28 10/23/2020 01:31 PM    Glucose 160 (H) 10/23/2020 01:31 PM    BUN 15 10/23/2020 01:31 PM    Creatinine 0.98 10/23/2020 01:31 PM     Lipids Latest Ref Rng & Units 6/25/2019 7/13/2018 1/18/2018 7/12/2017   Chol, Total <200 MG/ 168 167 200(H)   HDL 40 - 60 MG/DL 48 60 44 58   LDL 0 - 100 MG/DL 44.4 92.8 93 120. 4(H)   Trig <150 MG/ 76 151(H) 108   Chol/HDL Ratio 0 - 5.0   2.4 2.8 - 3.4   Some recent data might be hidden     Lab Results   Component Value Date/Time    ALT (SGPT) 29 10/23/2020 01:31 PM     Lab Results   Component Value Date/Time    Hemoglobin A1c 6.2 (H) 10/23/2020 01:31 PM    Hemoglobin A1c (POC) 5.8 08/11/2015 08:45 AM       EKG  Sinus rhythm. Incomplete right bundle branch block. No ST changes of ischemia    ECHO (12/2020)  Left Ventricle Normal cavity size and wall thickness. Wall motion: normal. The estimated EF is 50 - 55%. Visually measured ejection fraction. Low normal systolic function. Atrial fibrillation observed. Wall Scoring The left ventricular wall motion is normal.            Left Atrium Moderately dilated left atrium. Left Atrium volume index is 44.03 mL/m2.    Right Ventricle Normal global systolic function. Dilated right ventricle. Right Atrium Dilated right atrium. Aortic Valve Trileaflet valve structure and no stenosis. Mild aortic valve regurgitation. Mitral Valve No stenosis. Mitral valve non-specific thickening. Mild regurgitation. Tricuspid Valve Normal valve structure and no stenosis. Mild regurgitation. Pulmonic Valve Normal valve structure, no stenosis and no regurgitation. Aorta Normal aortic root and ascending aorta. Pulmonary Artery Pulmonary arterial systolic pressure (PASP) is 35 mmHg. Pulmonary hypertension not suggested by Doppler findings. STRESS TEST (01/20)  · Baseline ECG: Sinus rhythm. · Gated SPECT: Left ventricular function post-stress was normal. Calculated ejection fraction is 68%. · Myocardial perfusion imaging supports a low risk stress test.  · Technically difficult study. Inferior perfusion defect is most consistent with diaphragmatic attenuation artifact. There was no convincing evidence of significant reversible defect to suggest ongoing major ischemia. IMPRESSION & PLAN:  Mr. Shannan Herrera is 72 y.o. male with multiple medical problem    Hypertension:  Blood pressure is normal.  Continue metoprolol, losartan. He is on amlodipine. He has significant low extremity edema. Will discontinue amlodipine as it can cause lower extremity edema  We'll try chlorthalidone 25 mg daily. BMP will be checked again in 2 weeks. He will keep checking blood pressure at home. If edema does not improve, will consider echocardiogram to rule out A. fib related cardiomyopathy    Hyperlipidemia:  Currently on atorvastatin. Last LDL 44. Continue same PCP is checking lipid profile regularly    Diabetes:  Goal hemoglobin A1c less than 7 is recommended. Defer to PCP. Last A1c less 6.2 per patient    Atrial fibrillation:  Confirmed by EKG in August 2020  Remains in atrial fibrillation on exam.  He is asymptomatic  Continue beta-blocker and anticoagulation. No bleeding side effect    Thank you for allowing me to participate in patient care. Please feel free to call me if you have any question or concern. Marlin Claire MD  Please note: This document has been produced using voice recognition software. Unrecognized errors in transcription may be present.

## 2021-05-04 NOTE — LETTER
5/4/2021    Patient: Usama Chavez   YOB: 1950   Date of Visit: 5/4/2021     [address]iro [address]  [address]  [place]    Dear Wendy Ramos PA-C,      Thank you for referring Mr. Autumn Prieto to [facility] for evaluation. My notes for this consultation are attached. If you have questions, please do not hesitate to call me. I look forward to following your patient along with you.       Sincerely,    Narinder Mathews MD

## 2021-05-04 NOTE — PROGRESS NOTES
Michael Enamorado presents today for   Chief Complaint   Patient presents with    Follow-up     6 months       Michael Enamorado preferred language for health care discussion is english/other. Personal Protective Equipment:   Personal Protective Equipment was used including: mask-surgical and hands-gloves. Patient was placed on no precaution(s). Patient was masked. Precautions:   Patient currently on None  Patient currently roomed with door closed    Is someone accompanying this pt? yes    Is the patient using any DME equipment during [address]? no    Depression Screening:  3 most recent PHQ Screens 5/4/2021   Little interest or pleasure in doing things Not at all   Feeling down, depressed, irritable, or hopeless Not at all   Total Score PHQ 2 0       Learning Assessment:  Learning Assessment 4/29/2015   PRIMARY LEARNER Patient   HIGHEST LEVEL OF EDUCATION - PRIMARY LEARNER  [address] [place] [address] PRIMARY LEARNER NONE   CO-LEARNER CAREGIVER No   PRIMARY LANGUAGE ENGLISH    NEED No   LEARNER PREFERENCE PRIMARY READING   LEARNING SPECIAL TOPICS no   ANSWERED BY self   RELATIONSHIP SELF       Abuse Screening:  Abuse Screening Questionnaire 4/28/2020   Do you ever feel afraid of your partner? N   Are you in a relationship with someone who physically or mentally threatens you? N   Is it safe for you to go home? Y       Fall Risk  Fall Risk Assessment, last 12 mths 5/4/2021   Able to walk? Yes   Fall in past 12 months? 0   Do you feel unsteady? 0   Are you worried about falling 0       Pt currently taking Anticoagulant therapy? asa    Coordination of Care:  1. Have you been to the ER, urgent care clinic since your last visit? Hospitalized since your last visit? no    2. Have you seen or consulted any other health care providers outside of the [address] [place] [address] since your last visit? Include any pap smears or colon screening.  no

## 2021-05-04 NOTE — PATIENT INSTRUCTIONS
New Medication/Medication Changes  Stop Norvasc  Start Hygroten 25 mg Daily   **please allow 24-48 hrs for medication to be escribed to pharmacy** If you need any refills on medications please contact your pharmacy so that the request can be escribed to the provider for review.     Labs  BMP in two weeks    No appointment required for lab work  [address] [facility], [facility]  Hours are Mon-Fri 7:00 am-5:00 pm

## 2021-05-11 ENCOUNTER — HOSPITAL ENCOUNTER (OUTPATIENT)
Dept: LAB | Age: 71
Discharge: HOME | End: 2021-05-11
Payer: MEDICARE

## 2021-05-11 LAB
ALBUMIN SERPL-MCNC: 3.8 G/DL (ref 3.4–5)
ALBUMIN/GLOB SERPL: 1.1 {RATIO} (ref 0.8–1.7)
ALP SERPL-CCNC: 73 U/L (ref 45–117)
ALT SERPL-CCNC: 52 U/L (ref 16–61)
ANION GAP SERPL CALC-SCNC: 6 MMOL/L (ref 3–18)
AST SERPL-CCNC: 15 U/L (ref 10–38)
BILIRUB SERPL-MCNC: 0.8 MG/DL (ref 0.2–1)
BUN SERPL-MCNC: 22 MG/DL (ref 7–18)
BUN/CREAT SERPL: 24 (ref 12–20)
CALCIUM SERPL-MCNC: 9 MG/DL (ref 8.5–10.1)
CHLORIDE SERPL-SCNC: 102 MMOL/L (ref 100–111)
CO2 SERPL-SCNC: 30 MMOL/L (ref 21–32)
CREAT SERPL-MCNC: 0.9 MG/DL (ref 0.6–1.3)
GLOBULIN SER CALC-MCNC: 3.4 G/DL (ref 2–4)
GLUCOSE SERPL-MCNC: 119 MG/DL (ref 74–99)
POTASSIUM SERPL-SCNC: 3.7 MMOL/L (ref 3.5–5.5)
PROT SERPL-MCNC: 7.2 G/DL (ref 6.4–8.2)
SODIUM SERPL-SCNC: 138 MMOL/L (ref 136–145)

## 2021-05-11 PROCEDURE — 36415 COLL VENOUS BLD VENIPUNCTURE: CPT

## 2021-05-11 PROCEDURE — 80053 COMPREHEN METABOLIC PANEL: CPT

## 2021-08-19 NOTE — TELEPHONE ENCOUNTER
Doxycycline Counseling:  Patient counseled regarding possible photosensitivity and increased risk for sunburn.  Patient instructed to avoid sunlight, if possible.  When exposed to sunlight, patients should wear protective clothing, sunglasses, and sunscreen.  The patient was instructed to call the office immediately if the following severe adverse effects occur:  hearing changes, easy bruising/bleeding, severe headache, or vision changes.  The patient verbalized understanding of the proper use and possible adverse effects of doxycycline.  All of the patient's questions and concerns were addressed. Surgeons office, Tameka Delaney, requests letter for clearance be sent to them as well at the below fax #    Fax [phone] Benzoyl Peroxide Pregnancy And Lactation Text: This medication is Pregnancy Category C. It is unknown if benzoyl peroxide is excreted in breast milk. Include Pregnancy/Lactation Warning?: No Bactrim Pregnancy And Lactation Text: This medication is Pregnancy Category D and is known to cause fetal risk.  It is also excreted in breast milk. Topical Sulfur Applications Counseling: Topical Sulfur Counseling: Patient counseled that this medication may cause skin irritation or allergic reactions.  In the event of skin irritation, the patient was advised to reduce the amount of the drug applied or use it less frequently.   The patient verbalized understanding of the proper use and possible adverse effects of topical sulfur application.  All of the patient's questions and concerns were addressed. Spironolactone Counseling: Patient advised regarding risks of diarrhea, abdominal pain, hyperkalemia, birth defects (for female patients), liver toxicity and renal toxicity. The patient may need blood work to monitor liver and kidney function and potassium levels while on therapy. The patient verbalized understanding of the proper use and possible adverse effects of spironolactone.  All of the patient's questions and concerns were addressed. Topical Retinoid Pregnancy And Lactation Text: This medication is Pregnancy Category C. It is unknown if this medication is excreted in breast milk. Birth Control Pills Pregnancy And Lactation Text: This medication should be avoided if pregnant and for the first 30 days post-partum. Birth Control Pills Counseling: Birth Control Pill Counseling: I discussed with the patient the potential side effects of OCPs including but not limited to increased risk of stroke, heart attack, thrombophlebitis, deep venous thrombosis, hepatic adenomas, breast changes, GI upset, headaches, and depression.  The patient verbalized understanding of the proper use and possible adverse effects of OCPs. All of the patient's questions and concerns were addressed. Erythromycin Pregnancy And Lactation Text: This medication is Pregnancy Category B and is considered safe during pregnancy. It is also excreted in breast milk. Tazorac Pregnancy And Lactation Text: This medication is not safe during pregnancy. It is unknown if this medication is excreted in breast milk. Benzoyl Peroxide Counseling: Patient counseled that medicine may cause skin irritation and bleach clothing.  In the event of skin irritation, the patient was advised to reduce the amount of the drug applied or use it less frequently.   The patient verbalized understanding of the proper use and possible adverse effects of benzoyl peroxide.  All of the patient's questions and concerns were addressed. Azithromycin Counseling:  I discussed with the patient the risks of azithromycin including but not limited to GI upset, allergic reaction, drug rash, diarrhea, and yeast infections. Tetracycline Pregnancy And Lactation Text: This medication is Pregnancy Category D and not consider safe during pregnancy. It is also excreted in breast milk. Isotretinoin Counseling: Patient should get monthly blood tests, not donate blood, not drive at night if vision affected, not share medication, and not undergo elective surgery for 6 months after tx completed. Side effects reviewed, pt to contact office should one occur. Topical Retinoid counseling:  Patient advised to apply a pea-sized amount only at bedtime and wait 30 minutes after washing their face before applying.  If too drying, patient may add a non-comedogenic moisturizer. The patient verbalized understanding of the proper use and possible adverse effects of retinoids.  All of the patient's questions and concerns were addressed. Erythromycin Counseling:  I discussed with the patient the risks of erythromycin including but not limited to GI upset, allergic reaction, drug rash, diarrhea, increase in liver enzymes, and yeast infections. Bactrim Counseling:  I discussed with the patient the risks of sulfa antibiotics including but not limited to GI upset, allergic reaction, drug rash, diarrhea, dizziness, photosensitivity, and yeast infections.  Rarely, more serious reactions can occur including but not limited to aplastic anemia, agranulocytosis, methemoglobinemia, blood dyscrasias, liver or kidney failure, lung infiltrates or desquamative/blistering drug rashes. Azithromycin Pregnancy And Lactation Text: This medication is considered safe during pregnancy and is also secreted in breast milk. Tetracycline Counseling: Patient counseled regarding possible photosensitivity and increased risk for sunburn.  Patient instructed to avoid sunlight, if possible.  When exposed to sunlight, patients should wear protective clothing, sunglasses, and sunscreen.  The patient was instructed to call the office immediately if the following severe adverse effects occur:  hearing changes, easy bruising/bleeding, severe headache, or vision changes.  The patient verbalized understanding of the proper use and possible adverse effects of tetracycline.  All of the patient's questions and concerns were addressed. Patient understands to avoid pregnancy while on therapy due to potential birth defects. Topical Clindamycin Counseling: Patient counseled that this medication may cause skin irritation or allergic reactions.  In the event of skin irritation, the patient was advised to reduce the amount of the drug applied or use it less frequently.   The patient verbalized understanding of the proper use and possible adverse effects of clindamycin.  All of the patient's questions and concerns were addressed. Detail Level: Zone Dapsone Counseling: I discussed with the patient the risks of dapsone including but not limited to hemolytic anemia, agranulocytosis, rashes, methemoglobinemia, kidney failure, peripheral neuropathy, headaches, GI upset, and liver toxicity.  Patients who start dapsone require monitoring including baseline LFTs and weekly CBCs for the first month, then every month thereafter.  The patient verbalized understanding of the proper use and possible adverse effects of dapsone.  All of the patient's questions and concerns were addressed. Tazorac Counseling:  Patient advised that medication is irritating and drying.  Patient may need to apply sparingly and wash off after an hour before eventually leaving it on overnight.  The patient verbalized understanding of the proper use and possible adverse effects of tazorac.  All of the patient's questions and concerns were addressed. Minocycline Counseling: Patient advised regarding possible photosensitivity and discoloration of the teeth, skin, lips, tongue and gums.  Patient instructed to avoid sunlight, if possible.  When exposed to sunlight, patients should wear protective clothing, sunglasses, and sunscreen.  The patient was instructed to call the office immediately if the following severe adverse effects occur:  hearing changes, easy bruising/bleeding, severe headache, or vision changes.  The patient verbalized understanding of the proper use and possible adverse effects of minocycline.  All of the patient's questions and concerns were addressed. Spironolactone Pregnancy And Lactation Text: This medication can cause feminization of the male fetus and should be avoided during pregnancy. The active metabolite is also found in breast milk. Topical Clindamycin Pregnancy And Lactation Text: This medication is Pregnancy Category B and is considered safe during pregnancy. It is unknown if it is excreted in breast milk. Isotretinoin Pregnancy And Lactation Text: This medication is Pregnancy Category X and is considered extremely dangerous during pregnancy. It is unknown if it is excreted in breast milk. Dapsone Pregnancy And Lactation Text: This medication is Pregnancy Category C and is not considered safe during pregnancy or breast feeding. High Dose Vitamin A Pregnancy And Lactation Text: High dose vitamin A therapy is contraindicated during pregnancy and breast feeding. Sarecycline Counseling: Patient advised regarding possible photosensitivity and discoloration of the teeth, skin, lips, tongue and gums.  Patient instructed to avoid sunlight, if possible.  When exposed to sunlight, patients should wear protective clothing, sunglasses, and sunscreen.  The patient was instructed to call the office immediately if the following severe adverse effects occur:  hearing changes, easy bruising/bleeding, severe headache, or vision changes.  The patient verbalized understanding of the proper use and possible adverse effects of sarecycline.  All of the patient's questions and concerns were addressed. High Dose Vitamin A Counseling: Side effects reviewed, pt to contact office should one occur. Topical Sulfur Applications Pregnancy And Lactation Text: This medication is Pregnancy Category C and has an unknown safety profile during pregnancy. It is unknown if this topical medication is excreted in breast milk. Doxycycline Pregnancy And Lactation Text: This medication is Pregnancy Category D and not consider safe during pregnancy. It is also excreted in breast milk but is considered safe for shorter treatment courses.

## 2021-10-14 NOTE — PROGRESS NOTES
[address], [facility], [facility] - Phone: [phone]  Fax: [phone] FOR PHYSICAL THERAPY          Patient Name: Minh Christensen : 1950   Treatment/Medical Diagnosis: Right knee pain [M25.561]   Onset Date: 10/28/2020    Referral Source: Jayme Worley MD [facility]): 2020   Prior Hospitalization: See Medical History Provider #: [id number]   Prior Level of Function: I in functional ADLs/ambulation; reciprocal stair negotiation   Comorbidities: L TKR, OA, HTN, A-Fib, DM, latex allergy, heart disease   Medications: Verified on Patient Summary List   Visits from [facility]: 21 Missed Visits: 0     Goal/Measure of Progress Goal Met? 1. Pt will demonstrate increased right knee AROM to >/= 0-130deg in order to more easily ambulate and negotiate stairs   Status at last Eval: 2 - 129 deg  Current Status:  2-120deg R knee AROM progressing   2. Pt will improve SLS on RLE to >/= 30sec in order to decrease risk of falls   Status at last Eval: unable Current Status: Not tested no   3. Pt will be able to tolerate 10min on treadmill without rest break/pain/LOB in order to resume community walking program   Status at last Eval: unable Current Status: Achieved yes     Key Functional Changes/Progress: Pt presented to [facility] w/ recent max pain of 5/10 and 75% improvement in symptoms since beginning PT. Current FOTO = 46, GROC = +4. Knee AROM = 2-120deg R knee AROM . Knee circumference measurements @ mid-patella: (R) 42.5 cm, (L) 42.25 cm. Pt is able to ambulate 500' w/o AD, no rest break required. Pt is (I) and compliant with HEP exercises. Assessments/Recommendations: Other: Patient did not return to finish therapy. If you have any questions/comments please contact us directly at [phone]. Thank you for allowing us to assist in the care of your patient.     Therapist Signature: Marcio Matos PT, DPT Date: 10/14/2021   Reporting Period: 1/18/21 - 2/17/21 Time: 2:38 PM

## 2021-11-09 ENCOUNTER — TRANSCRIBE ORDER (OUTPATIENT)
Dept: CARDIAC CATH/INVASIVE PROCEDURES | Age: 71
End: 2021-11-09

## 2021-11-09 ENCOUNTER — OFFICE VISIT (OUTPATIENT)
Dept: CARDIOLOGY CLINIC | Age: 71
End: 2021-11-09
Payer: MEDICARE

## 2021-11-09 VITALS
HEART RATE: 76 BPM | DIASTOLIC BLOOD PRESSURE: 79 MMHG | SYSTOLIC BLOOD PRESSURE: 124 MMHG | TEMPERATURE: 98.1 F | OXYGEN SATURATION: 98 % | BODY MASS INDEX: 32.64 KG/M2 | RESPIRATION RATE: 14 BRPM | WEIGHT: 221 LBS

## 2021-11-09 DIAGNOSIS — I10 ESSENTIAL HYPERTENSION: ICD-10-CM

## 2021-11-09 DIAGNOSIS — I48.91 ATRIAL FIBRILLATION, UNSPECIFIED TYPE (HCC): Primary | ICD-10-CM

## 2021-11-09 DIAGNOSIS — I48.91 AFIB (HCC): Primary | ICD-10-CM

## 2021-11-09 DIAGNOSIS — Z01.818 PRE-OP TESTING: ICD-10-CM

## 2021-11-09 PROCEDURE — 1101F PT FALLS ASSESS-DOCD LE1/YR: CPT | Performed by: INTERNAL MEDICINE

## 2021-11-09 PROCEDURE — G8428 CUR MEDS NOT DOCUMENT: HCPCS | Performed by: INTERNAL MEDICINE

## 2021-11-09 PROCEDURE — G8754 DIAS BP LESS 90: HCPCS | Performed by: INTERNAL MEDICINE

## 2021-11-09 PROCEDURE — 3017F COLORECTAL CA SCREEN DOC REV: CPT | Performed by: INTERNAL MEDICINE

## 2021-11-09 PROCEDURE — G8417 CALC BMI ABV UP PARAM F/U: HCPCS | Performed by: INTERNAL MEDICINE

## 2021-11-09 PROCEDURE — G8752 SYS BP LESS 140: HCPCS | Performed by: INTERNAL MEDICINE

## 2021-11-09 PROCEDURE — G8536 NO DOC ELDER MAL SCRN: HCPCS | Performed by: INTERNAL MEDICINE

## 2021-11-09 PROCEDURE — G8510 SCR DEP NEG, NO PLAN REQD: HCPCS | Performed by: INTERNAL MEDICINE

## 2021-11-09 PROCEDURE — 99215 OFFICE O/P EST HI 40 MIN: CPT | Performed by: INTERNAL MEDICINE

## 2021-11-09 RX ORDER — METOPROLOL TARTRATE 25 MG/1
37.5 TABLET, FILM COATED ORAL 2 TIMES DAILY
Qty: 180 TABLET | Refills: 3 | Status: SHIPPED | OUTPATIENT
Start: 2021-11-09

## 2021-11-09 NOTE — PATIENT INSTRUCTIONS
New Medication/Medication Changes  Stop chlorthalidone   Increase Lopressor to 37.5 mg twice a day  **please allow 24-48 hrs for medication to be escribed to pharmacy** If you need any refills on medications please contact your pharmacy so that the request can be escribed to the provider for review. DR. CASTORENA [facility]          Patient  EP Instructions            Pre procedure lab( BMP) to be completed within 3-5 days prior to procedure. Covid testing must be completed 48 prior to procedure. Labs drawn in [address] [place] [address]. Their hours of operation are Monday through Friday 7am-3:30 pm.  If you have and questions regarding directions please contact them at [phone]. Covid testing is only offered Mon, Wednesday, Thursday and Friday from 1 pm-3 pm at this location. We request that you isolate after testing until procedure date. 1. You are scheduled to have a  STEVEN/ Cardioversion on  November 12, 2021 , at 1030. Please check in one hour prior to procedure (0930). 2. Please go to DR. RAFFAELE FINNEGAN and damaris in the outpatient parking lot that is located around to the back of the hospital and enter through the [facility] building. Once you enter through the [facility] check in with the  there. The  will either give you directions or assist you in getting to the cath holding area. 3.  You are not to eat or drink anything after midnight the night before your   procedure. 4. Please continue to take your medications with a small sip of water on the morning of the procedure. 5. If you are diabetic, do not take your insulin/sugar pill the morning of the procedure. 6. We encourage families to wait in the waiting room on the first floor while the procedure is being done. The Doctor will come out and talk with you as soon as the procedure is over.     7. There is the possibility that you may spend the night in the [facility], depending on the results of the procedure. This will be determined after the procedure is done. 8.   If you or your family have any questions, please call our office Monday-Friday 9:00am         -4:30 pm , at [phone], and ask to speak to one of the nurses.

## 2021-11-09 NOTE — PROGRESS NOTES
Identified pt with two pt identifiers(name and ). Reviewed record in preparation for visit and have obtained necessary documentation. Rohit Ward presents today for   Chief Complaint   Patient presents with    Follow-up     6m       Rohit Ward preferred language for health care discussion is english/other. Personal Protective Equipment:   Personal Protective Equipment was used including: mask-surgical and hands-gloves. Patient was placed on no precaution(s). Patient was masked. Precautions:   Patient currently on None  Patient currently roomed with door closed    Is someone accompanying this pt? Yes wife    Is the patient using any DME equipment during [address]? no    Depression Screening:  3 most recent PHQ Screens 2021   Little interest or pleasure in doing things Not at all   Feeling down, depressed, irritable, or hopeless Not at all   Total Score PHQ 2 0       Learning Assessment:  Learning Assessment 2015   PRIMARY LEARNER Patient   HIGHEST LEVEL OF EDUCATION - PRIMARY LEARNER  SOME COLLEGE   BARRIERS PRIMARY LEARNER NONE   CO-LEARNER CAREGIVER No   PRIMARY LANGUAGE ENGLISH    NEED No   LEARNER PREFERENCE PRIMARY READING   LEARNING SPECIAL TOPICS no   ANSWERED BY self   RELATIONSHIP SELF       Abuse Screening:  Abuse Screening Questionnaire 2020   Do you ever feel afraid of your partner? N   Are you in a relationship with someone who physically or mentally threatens you? N   Is it safe for you to go home? Y       Fall Risk  Fall Risk Assessment, last 12 mths 2021   Able to walk? Yes   Fall in past 12 months? -   Do you feel unsteady? -   Are you worried about falling -       Pt currently taking Anticoagulant therapy? no    Coordination of Care:  1. Have you been to the ER, urgent care clinic since your last visit? Hospitalized since your last visit? no    2.  Have you seen or consulted any other health care providers outside of the [facility] since your last visit? Include any pap smears or colon screening. no      Please see Red banners under Allergies and Med Rec to remove outside inquires. All correct information has been verified with patient and added to chart.      Medication's patient's would liked removed has been marked not taking to be removed per Verbal order and read back per Katherine Cabrera MD

## 2021-11-09 NOTE — PROGRESS NOTES
[facility]    Mr. Valentino Blanco is a 80 y.o. male with a history of diabetes, hypertension, hyperlipidemia and DJD, sleep apnea    Patient is here today for add-on appointment. He denies any symptoms concerning for angina or heart failure. He has been complaining of dizziness quite often since last time. Occasional palpitation. No presyncope or syncope. He denies any palpitation, presyncope or syncope. Past Medical History:   Diagnosis Date    Arrhythmia     Atrial Fib    Arthritis     Degenerative disc disease, lumbar     Diabetes ([address]     HLD (hyperlipidemia)     Hypertension     Intracranial hemorrhage, cerebellar ([address] 2018    Hypertensive crisis, non-traumatic    PPD positive, treated 2018    was treated    Sleep apnea     on cpap    Spondylosis of lumbar spine          Past Surgical History:   Procedure Laterality Date    COLONOSCOPY  10/13/2015 Return 10/14/2025    Dr. Vanessa Jamison; screening    HX COLONOSCOPY      HX KNEE ARTHROSCOPY Right 1986    right knee    HX KNEE REPLACEMENT Left 01/2020    HX OTHER SURGICAL      cyst removed from chin    HX WRIST FRACTURE TX Left     ORIF       Current Outpatient Medications   Medication Sig    apixaban (ELIQUIS) 5 mg tablet Take 1 Tab by mouth two (2) times a day.  atorvastatin (LIPITOR) 10 mg tablet Take 1 Tab by mouth daily.  losartan (COZAAR) 50 mg tablet Take 1 Tab by mouth daily. (Patient taking differently: Take 25 mg by mouth daily.)    metoprolol tartrate (LOPRESSOR) 25 mg tablet Take 1 Tab by mouth two (2) times a day.  oxyCODONE IR (ROXICODONE) 5 mg immediate release tablet Take 5 mg by mouth every eight (8) hours as needed for Pain. 1-2 tabs every 8 hours prn pain    acetaminophen (TYLENOL) 500 mg tablet Take 500 mg by mouth every six (6) hours as needed for Pain.     HYDROcodone-acetaminophen (NORCO) 5-325 mg per tablet Take  by mouth two (2) times daily as needed.  cetirizine (ZYRTEC) 10 mg tablet Take 1 Tab by mouth daily.  metFORMIN ER (GLUCOPHAGE XR) 500 mg tablet Take 2 tabs po daily with a meal (Patient taking differently: two (2) times a day.)    SITagliptin (JANUVIA) 50 mg tablet Take 1 Tab by mouth daily.  sildenafil citrate (VIAGRA) 100 mg tablet Take 1 Tab by mouth as needed (E.D.). Once daily    tamsulosin (FLOMAX) 0.4 mg capsule Take 1 Cap by mouth daily.  melatonin 5 mg tablet Take 5 mg by mouth nightly. No current facility-administered medications for this visit. Allergies and Sensitivities:  No Known Allergies    Family History:  Family History   Problem Relation Age of Onset    No Known Problems Mother     No Known Problems Father        Social History:  Social History     Tobacco Use    Smoking status: Never Smoker    Smokeless tobacco: Never Used   Substance Use Topics    Alcohol use: Yes     Alcohol/week: 1.7 standard drinks     Types: 2 Cans of beer per week    Drug use: No     He  reports that he has never smoked. He has never used smokeless tobacco.  He  reports current alcohol use of about 1.7 standard drinks of alcohol per week. Review of Systems:  Cardiac symptoms as noted above in HPI. All others negative. Physical Exam:  BP Readings from Last 3 Encounters:   11/09/21 124/79   05/04/21 (!) 130/91   12/08/20 110/76         Pulse Readings from Last 3 Encounters:   11/09/21 76   05/04/21 89   11/20/20 75          Wt Readings from Last 3 Encounters:   11/09/21 100.2 kg (221 lb)   05/04/21 98.9 kg (218 lb)   12/08/20 98 kg (216 lb)       Objective:   Vital Signs: (As obtained by patient/caregiver at home)  Visit Vitals  /79   Pulse 76   Temp 98.1 °F (36.7 °C) (Temporal)   Resp 14   Wt 100.2 kg (221 lb)   SpO2 98%   BMI 32.64 kg/m²      Constitutional: Oriented to person, place, and time. HENT: Head: Normocephalic and atraumatic. Neck: No JVD present. Cardiovascular: Irregular rhythm.    No murmur, gallop or rubs appreciated  Lung: Breath sounds normal. No respiratory distress. No ronchi or rales appreciated  Abdominal: No tenderness. No rebound and no guarding. Musculoskeletal: There is no lower extremity edema. No cynosis  Lymphadenopathy:  No cervical or supraclavicular adenopathy appriciated. Review of Data  LABS:   Lab Results   Component Value Date/Time    Sodium 138 05/11/2021 11:29 AM    Potassium 3.7 05/11/2021 11:29 AM    Chloride 102 05/11/2021 11:29 AM    CO2 30 05/11/2021 11:29 AM    Glucose 119 (H) 05/11/2021 11:29 AM    BUN 22 (H) 05/11/2021 11:29 AM    Creatinine 0.90 05/11/2021 11:29 AM     Lipids Latest Ref Rng & Units 6/25/2019 7/13/2018 1/18/2018   Chol, Total <200 MG/ 168 167   HDL 40 - 60 MG/DL 48 60 44   LDL 0 - 100 MG/DL 44.4 92.8 93   Trig <150 MG/ 76 151(H)   Chol/HDL Ratio 0 - 5.0   2.4 2.8 -   Some recent data might be hidden     Lab Results   Component Value Date/Time    ALT (SGPT) 52 05/11/2021 11:29 AM     Lab Results   Component Value Date/Time    Hemoglobin A1c 6.2 (H) 10/23/2020 01:31 PM    Hemoglobin A1c (POC) 5.8 08/11/2015 08:45 AM       EKG  Sinus rhythm. Incomplete right bundle branch block. No ST changes of ischemia    ECHO (12/2020)  Left Ventricle Normal cavity size and wall thickness. Wall motion: normal. The estimated EF is 50 - 55%. Visually measured ejection fraction. Low normal systolic function. Atrial fibrillation observed. Wall Scoring The left ventricular wall motion is normal.            Left Atrium Moderately dilated left atrium. Left Atrium volume index is 44.03 mL/m2. Right Ventricle Normal global systolic function. Dilated right ventricle. Right Atrium Dilated right atrium. Aortic Valve Trileaflet valve structure and no stenosis. Mild aortic valve regurgitation. Mitral Valve No stenosis. Mitral valve non-specific thickening. Mild regurgitation. Tricuspid Valve Normal valve structure and no stenosis.  Mild regurgitation. Pulmonic Valve Normal valve structure, no stenosis and no regurgitation. Aorta Normal aortic root and ascending aorta. Pulmonary Artery Pulmonary arterial systolic pressure (PASP) is 35 mmHg. Pulmonary hypertension not suggested by Doppler findings. STRESS TEST (01/20)  · Baseline ECG: Sinus rhythm. · Gated SPECT: Left ventricular function post-stress was normal. Calculated ejection fraction is 68%. · Myocardial perfusion imaging supports a low risk stress test.  · Technically difficult study. Inferior perfusion defect is most consistent with diaphragmatic attenuation artifact. There was no convincing evidence of significant reversible defect to suggest ongoing major ischemia. IMPRESSION & PLAN:  Mr. Jackelyn Lopez is 80 y.o. male with multiple medical problem    Hypertension:  Blood pressure is normal.  Continue metoprolol, losartan and chlorthalidone  His edema has resolved after discontinuing amlodipine and starting chlorthalidone however now patient has dizziness and some palpitation  I will discontinue chlorthalidone  Increase metoprolol to 37.5 mg twice daily    Atrial fibrillation:  Confirmed by EKG in August 2020  Remains in atrial fibrillation on exam.  Now has some palpitation and dizziness. Discontinue chlorthalidone  Family and patient is considering wanting rhythm control strategy. Electrical cardioversion versus antiarrhythmic therapy and ablation discussed. After lengthy discussion, patient would like to proceed with external cardioversion after confirming no thrombus by STEVEN. Risk, benefit, alternatives  He would like to proceed with STEVEN cardioversion  Continue beta-blocker and anticoagulation. Increasing dose of metoprolol to 37.5 mg twice daily no bleeding side effect    Hyperlipidemia:  Currently on atorvastatin. Last LDL 44. Continue same PCP is checking lipid profile regularly    Diabetes:  Goal hemoglobin A1c less than 7 is recommended. Defer to PCP.   Last A1c less 6.2 per patient      Thank you for allowing me to participate in patient care. Please feel free to call me if you have any question or concern. Kristy Hernandes MD  Please note: This document has been produced using voice recognition software. Unrecognized errors in transcription may be present.

## 2021-11-10 ENCOUNTER — HOSPITAL ENCOUNTER (OUTPATIENT)
Dept: LAB | Age: 71
Discharge: HOME | End: 2021-11-10
Payer: MEDICARE

## 2021-11-10 ENCOUNTER — TRANSCRIBE ORDER (OUTPATIENT)
Dept: CARDIAC CATH/INVASIVE PROCEDURES | Age: 71
End: 2021-11-10

## 2021-11-10 DIAGNOSIS — Z01.818 PRE-OP TESTING: ICD-10-CM

## 2021-11-10 DIAGNOSIS — I48.91 AFIB (HCC): Primary | ICD-10-CM

## 2021-11-10 DIAGNOSIS — I48.91 ATRIAL FIBRILLATION, UNSPECIFIED TYPE (HCC): ICD-10-CM

## 2021-11-10 LAB
ANION GAP SERPL CALC-SCNC: 7 MMOL/L (ref 3–18)
BUN SERPL-MCNC: 16 MG/DL (ref 7–18)
BUN/CREAT SERPL: 17 (ref 12–20)
CALCIUM SERPL-MCNC: 9.5 MG/DL (ref 8.5–10.1)
CHLORIDE SERPL-SCNC: 102 MMOL/L (ref 100–111)
CO2 SERPL-SCNC: 29 MMOL/L (ref 21–32)
CREAT SERPL-MCNC: 0.93 MG/DL (ref 0.6–1.3)
GLUCOSE SERPL-MCNC: 114 MG/DL (ref 74–99)
POTASSIUM SERPL-SCNC: 3.7 MMOL/L (ref 3.5–5.5)
SODIUM SERPL-SCNC: 138 MMOL/L (ref 136–145)

## 2021-11-10 PROCEDURE — U0003 INFECTIOUS AGENT DETECTION BY NUCLEIC ACID (DNA OR RNA); SEVERE ACUTE RESPIRATORY SYNDROME CORONAVIRUS 2 (SARS-COV-2) (CORONAVIRUS DISEASE [COVID-19]), AMPLIFIED PROBE TECHNIQUE, MAKING USE OF HIGH THROUGHPUT TECHNOLOGIES AS DESCRIBED BY CMS-2020-01-R: HCPCS

## 2021-11-10 PROCEDURE — 80048 BASIC METABOLIC PNL TOTAL CA: CPT

## 2021-11-10 PROCEDURE — 36415 COLL VENOUS BLD VENIPUNCTURE: CPT

## 2021-11-11 LAB — SARS-COV-2, COV2NT: NOT DETECTED

## 2021-11-12 ENCOUNTER — ANESTHESIA EVENT (OUTPATIENT)
Dept: CARDIAC CATH/INVASIVE PROCEDURES | Age: 71
End: 2021-11-12
Payer: MEDICARE

## 2021-11-12 ENCOUNTER — APPOINTMENT (OUTPATIENT)
Dept: NON INVASIVE DIAGNOSTICS | Age: 71
End: 2021-11-12
Attending: INTERNAL MEDICINE
Payer: MEDICARE

## 2021-11-12 ENCOUNTER — HOSPITAL ENCOUNTER (OUTPATIENT)
Age: 71
Setting detail: OUTPATIENT SURGERY
Discharge: HOME | End: 2021-11-12
Attending: INTERNAL MEDICINE | Admitting: INTERNAL MEDICINE
Payer: MEDICARE

## 2021-11-12 ENCOUNTER — ANESTHESIA (OUTPATIENT)
Dept: CARDIAC CATH/INVASIVE PROCEDURES | Age: 71
End: 2021-11-12
Payer: MEDICARE

## 2021-11-12 ENCOUNTER — TRANSCRIBE ORDER (OUTPATIENT)
Dept: CARDIAC CATH/INVASIVE PROCEDURES | Age: 71
End: 2021-11-12

## 2021-11-12 ENCOUNTER — HOSPITAL ENCOUNTER (OUTPATIENT)
Dept: CARDIAC CATH/INVASIVE PROCEDURES | Age: 71
Discharge: HOME | End: 2021-11-12
Attending: INTERNAL MEDICINE | Admitting: INTERNAL MEDICINE
Payer: MEDICARE

## 2021-11-12 VITALS
SYSTOLIC BLOOD PRESSURE: 96 MMHG | HEART RATE: 63 BPM | WEIGHT: 218 LBS | DIASTOLIC BLOOD PRESSURE: 55 MMHG | OXYGEN SATURATION: 100 % | BODY MASS INDEX: 32.29 KG/M2 | RESPIRATION RATE: 24 BRPM | HEIGHT: 69 IN

## 2021-11-12 VITALS — BODY MASS INDEX: 32.58 KG/M2 | WEIGHT: 220 LBS | HEIGHT: 69 IN

## 2021-11-12 DIAGNOSIS — I48.91 AFIB (HCC): Primary | ICD-10-CM

## 2021-11-12 DIAGNOSIS — I48.91 AFIB (HCC): ICD-10-CM

## 2021-11-12 LAB
ATRIAL RATE: 64 BPM
CALCULATED P AXIS, ECG09: 26 DEGREES
CALCULATED R AXIS, ECG10: -68 DEGREES
CALCULATED R AXIS, ECG10: -72 DEGREES
CALCULATED T AXIS, ECG11: 30 DEGREES
CALCULATED T AXIS, ECG11: 36 DEGREES
DIAGNOSIS, 93000: NORMAL
DIAGNOSIS, 93000: NORMAL
P-R INTERVAL, ECG05: 252 MS
Q-T INTERVAL, ECG07: 392 MS
Q-T INTERVAL, ECG07: 424 MS
QRS DURATION, ECG06: 94 MS
QRS DURATION, ECG06: 98 MS
QTC CALCULATION (BEZET), ECG08: 420 MS
QTC CALCULATION (BEZET), ECG08: 437 MS
VENTRICULAR RATE, ECG03: 64 BPM
VENTRICULAR RATE, ECG03: 69 BPM

## 2021-11-12 PROCEDURE — 93005 ELECTROCARDIOGRAM TRACING: CPT

## 2021-11-12 PROCEDURE — 99153 MOD SED SAME PHYS/QHP EA: CPT

## 2021-11-12 PROCEDURE — 96374 THER/PROPH/DIAG INJ IV PUSH: CPT

## 2021-11-12 PROCEDURE — 99152 MOD SED SAME PHYS/QHP 5/>YRS: CPT

## 2021-11-12 PROCEDURE — 74011000258 HC RX REV CODE- 258: Performed by: ANESTHESIOLOGY

## 2021-11-12 PROCEDURE — 00410 ANES INTEG SYS CONV ARRHYT: CPT | Performed by: ANESTHESIOLOGY

## 2021-11-12 PROCEDURE — 93320 DOPPLER ECHO COMPLETE: CPT

## 2021-11-12 PROCEDURE — 74011250636 HC RX REV CODE- 250/636: Performed by: ANESTHESIOLOGY

## 2021-11-12 RX ORDER — SODIUM CHLORIDE 9 MG/ML
INJECTION, SOLUTION INTRAVENOUS
Status: DISCONTINUED | OUTPATIENT
Start: 2021-11-12 | End: 2021-11-12 | Stop reason: HOSPADM

## 2021-11-12 RX ORDER — PROPOFOL 10 MG/ML
INJECTION, EMULSION INTRAVENOUS AS NEEDED
Status: DISCONTINUED | OUTPATIENT
Start: 2021-11-12 | End: 2021-11-12 | Stop reason: HOSPADM

## 2021-11-12 RX ADMIN — PROPOFOL 60 MG: 10 INJECTION, EMULSION INTRAVENOUS at 08:34

## 2021-11-12 RX ADMIN — PROPOFOL 50 MG: 10 INJECTION, EMULSION INTRAVENOUS at 08:47

## 2021-11-12 RX ADMIN — SODIUM CHLORIDE: 900 INJECTION, SOLUTION INTRAVENOUS at 08:30

## 2021-11-12 RX ADMIN — PROPOFOL 50 MG: 10 INJECTION, EMULSION INTRAVENOUS at 08:38

## 2021-11-12 RX ADMIN — PROPOFOL 50 MG: 10 INJECTION, EMULSION INTRAVENOUS at 08:43

## 2021-11-12 RX ADMIN — PHENYLEPHRINE HYDROCHLORIDE 100 MCG: 10 INJECTION INTRAVENOUS at 08:38

## 2021-11-12 RX ADMIN — PROPOFOL 50 MG: 10 INJECTION, EMULSION INTRAVENOUS at 08:54

## 2021-11-12 NOTE — DISCHARGE INSTRUCTIONS
Patient Education   Patient Education     DISCHARGE SUMMARY from Nurse    PATIENT INSTRUCTIONS:    After general anesthesia or intravenous sedation, for 24 hours or while taking prescription Narcotics:  · Limit your activities  · Do not drive and operate hazardous machinery  · Do not make important personal or business decisions  · Do  not drink alcoholic beverages  · If you have not urinated within 8 hours after discharge, please contact your surgeon on call. Report the following to your surgeon:  · Excessive pain, swelling, redness or odor of or around the surgical area  · Temperature over 100.5  · Nausea and vomiting lasting longer than 4 hours or if unable to take medications  · Any signs of decreased circulation or nerve impairment to extremity: change in color, persistent  numbness, tingling, coldness or increase pain  · Any questions    What to do at Home:  Recommended activity: Activity as tolerated and no driving for today. *  Please give a list of your current medications to your Primary Care Provider. *  Please update this list whenever your medications are discontinued, doses are      changed, or new medications (including over-the-counter products) are added. *  Please carry medication information at all times in case of emergency situations. These are general instructions for a healthy lifestyle:    No smoking/ No tobacco products/ Avoid exposure to second hand smoke  Surgeon General's Warning:  Quitting smoking now greatly reduces serious risk to your health.     Obesity, smoking, and sedentary lifestyle greatly increases your risk for illness    A healthy diet, regular physical exercise & weight monitoring are important for maintaining a healthy lifestyle    You may be retaining fluid if you have a history of heart failure or if you experience any of the following symptoms:  Weight gain of 3 pounds or more overnight or 5 pounds in a week, increased swelling in our hands or feet or shortness of breath while lying flat in bed. Please call your doctor as soon as you notice any of these symptoms; do not wait until your next office visit. The discharge information has been reviewed with the patient and spouse. The patient and spouse verbalized understanding. Discharge medications reviewed with the patient and spouse and appropriate educational materials and side effects teaching were provided. ___________________________________________________________________________________________________________________________________     Electrical Cardioversion: What to Expect at Home  Your Recovery     Electrical cardioversion is a treatment for an abnormal heartbeat, such as atrial fibrillation, supraventricular tachycardia, or ventricular tachycardia (VT). Your doctor used a brief electrical shock to reset your heart's rhythm. After the procedure, you may have redness, like a sunburn, where the patches were. The medicines you got to make you sleepy may make you feel drowsy for the rest of the day. Your doctor may have you take medicines to help the heart beat normally and to prevent blood clots. This care sheet gives you a general idea about how long it will take for you to recover. But each person recovers at a different pace. Follow the steps below to feel better as quickly as possible. How can you care for yourself at home? Medicines    · Be safe with medicines. Take your medicines exactly as prescribed. Call your doctor if you think you are having a problem with your medicine. You may take one or more of the following medicines:  ? Rate-control medicines to slow the heart rate. These include beta-blockers, calcium channel blockers, and digoxin. ? Rhythm control medicines that help the heart keep a normal rhythm. ? Blood thinners, also called anticoagulants, which help prevent blood clots. You will get more details on the specific medicines your doctor prescribes.  Be sure you know how to take your medicines safely.     · Do not take any vitamins, over-the-counter medicines, or herbal products without talking to your doctor first.   Exercise    · Start light exercise if your doctor says that it's okay. Even a small amount will help you get stronger, have more energy, and manage your stress. Walking is an easy way to get exercise. Start out by walking a little more than you did in the hospital. Bit by bit, increase the amount you walk.     · When you exercise, watch for signs that your heart is working too hard. You are pushing too hard if you cannot talk while you are exercising. If you become short of breath or dizzy or have chest pain, sit down and rest right away.     · Check your pulse regularly. Place two fingers on the artery at the palm side of your wrist in line with your thumb. If your heartbeat seems uneven or fast, talk to your doctor. Other instructions    · Ask your doctor when you can drive again.     · Do not smoke. If you need help quitting, talk to your doctor about stop-smoking programs and medicines. These can increase your chances of quitting for good.     · Limit alcohol. Follow-up care is a key part of your treatment and safety. Be sure to make and go to all appointments, and call your doctor if you are having problems. It's also a good idea to know your test results and keep a list of the medicines you take. When should you call for help? Call 911 anytime you think you may need emergency care. For example, call if:    · You passed out (lost consciousness).     · You have chest pain or pressure. This may occur with:  ? Sweating. ? Shortness of breath. ? Nausea or vomiting. ? Pain that spreads from the chest to the neck, jaw, or one or both shoulders or arms. ? A fast or uneven pulse. After calling 911, the  may tell you to chew 1 adult-strength or 2 to 4 low-dose aspirin. Wait for an ambulance.  Do not try to drive yourself.     · You have symptoms of a stroke. These may include:  ? Sudden numbness, tingling, weakness, or loss of movement in your face, arm, or leg, especially on only one side of your body. ? Sudden vision changes. ? Sudden trouble speaking. ? Sudden confusion or trouble understanding simple statements. ? Sudden problems with walking or balance. ? A sudden, severe headache that is different from past headaches. Call your doctor now or seek immediate medical care if:    · You feel dizzy or lightheaded, or you feel like you may faint.     · You have a fast or irregular heartbeat. Watch closely for any changes in your health, and be sure to contact your doctor if you have any problems. Where can you learn more? Go to http://www.gray.com/  Enter A617 in the search box to learn more about \"Electrical Cardioversion: What to Expect at Home. \"  Current as of: April 29, 2021               Content Version: 13.0  © 2006-2021 [employer]. Care instructions adapted under license by [employer] (which disclaims liability or warranty for this information). If you have questions about a medical condition or this instruction, always ask your healthcare professional. [place] [address] any warranty or liability for your use of this information. Transesophageal Echocardiogram: What to Expect at [facility]  A transesophageal echocardiogram is a test to help your doctor look at the inside of your heart. A small device called a transducer directs sound waves toward your heart. The sound waves make a picture of the heart's valves and chambers. Before the test, your throat was sprayed with medicine to numb it. Your throat may be sore for a few days. You may have had a sedative to help you relax. You may be unsteady after having sedation. It can take a few hours for the medicine's effects to wear off.  Common side effects include nausea, vomiting, and feeling sleepy or tired.  This care sheet gives you a general idea about how long it will take for you to recover. But each person recovers at a different pace. Follow the steps below to feel better as quickly as possible. How can you care for yourself at home? Activity    · If a sedative was used, your doctor will tell you when it is safe for you to do your normal activities.     · For your safety, do not drive or operate any machinery that could be dangerous. Wait until the medicine wears off and you can think clearly and react easily. Diet    · Do not eat or drink until the numbness in your throat wears off.     · When the numbness is gone, you can eat your normal diet.     · Throat lozenges and warm saltwater gargles can help relieve throat soreness. Throat lozenges can be used by people age 3 or older. And most people can gargle at age 6 and older.     · Do not drink alcohol for 24 hours. Follow-up care is a key part of your treatment and safety. Be sure to make and go to all appointments, and call your doctor if you are having problems. It's also a good idea to know your test results and keep a list of the medicines you take. When should you call for help? Call 911 anytime you think you may need emergency care. For example, call if:    · Your stools are maroon or very bloody.     · You vomit blood or what looks like coffee grounds. Call your doctor now or seek immediate medical care if:    · You have pain in your chest, belly, or back.     · You have new or worse trouble swallowing.     · You have trouble breathing. Watch closely for changes in your health, and be sure to contact your doctor if you have any problems. Current as of: April 29, 2021               Content Version: 13.0  © 2006-2021 [employer]. Care instructions adapted under license by [employer] (which disclaims liability or warranty for this information).  If you have questions about a medical condition or this instruction, always ask your healthcare professional. [place] [address] any warranty or liability for your use of this information.

## 2021-11-12 NOTE — ANESTHESIA POSTPROCEDURE EVALUATION
Procedure(s):  EP CARDIOVERSION/STEVEN PRIOR. MAC    Anesthesia Post Evaluation      Multimodal analgesia: multimodal analgesia used between 6 hours prior to anesthesia start to PACU discharge  Patient location during evaluation: PACU  Patient participation: complete - patient participated  Level of consciousness: awake and alert  Pain management: adequate  Airway patency: patent  Anesthetic complications: no  Cardiovascular status: acceptable and hemodynamically stable  Respiratory status: acceptable  Hydration status: acceptable  Post anesthesia nausea and vomiting:  controlled      INITIAL Post-op Vital signs:   Vitals Value Taken Time   BP 94/67 11/12/21 0932   Temp     Pulse 88 11/12/21 0933   Resp 14 11/12/21 0933   SpO2 100 % 11/12/21 0933   Vitals shown include unvalidated device data.

## 2021-11-12 NOTE — ANESTHESIA PREPROCEDURE EVALUATION
Anesthetic History   No history of anesthetic complications            Review of Systems / Medical History  Patient summary reviewed, nursing notes reviewed and pertinent labs reviewed    Pulmonary        Sleep apnea: CPAP           Neuro/Psych   Within defined limits           Cardiovascular    Hypertension  Valvular problems/murmurs (mild): tricuspid insufficiency, mitral insufficiency and aortic insufficiency      Dysrhythmias : atrial fibrillation  Hyperlipidemia      Comments: 12/2020 ECHO  Estimated LVEF is 50 - 55%. Mild aortic valve regurgitation is present. Mild mitral valve regurgitation is present. Mild tricuspid valve regurgitation is present.        GI/Hepatic/Renal  Within defined limits              Endo/Other    Diabetes    Obesity and arthritis     Other Findings            Physical Exam    Airway  Mallampati: III  TM Distance: 4 - 6 cm  Neck ROM: normal range of motion   Mouth opening: Normal     Cardiovascular    Rhythm: irregular           Dental    Dentition: Upper dentition intact and Lower dentition intact     Pulmonary  Breath sounds clear to auscultation               Abdominal  GI exam deferred       Other Findings            Anesthetic Plan    ASA: 3  Anesthesia type: MAC            Anesthetic plan and risks discussed with: Patient

## 2021-11-12 NOTE — H&P
Please see clinic note  for detail. I saw and examined patient and confirmed above. No interval change. Labs reviewed. Procedure explained to patient and all risk and benefit discussed with patient. Discuss with patient about STEVEN procedure. Risk, benefit and alternatives discussed with patient about procedure. All complication of procedure including but not limited to emergent thoracic surgery, esophageal damage other complication discussed in detail. DW patient about rate control strategy vs rhythm control strategy. Risk , benefit and alternatives and complication of both discussed  Complication assocaited with STEVEN and possible cardioversion discussed in detail including but not limited to emergent thoracic surgery, pacemaker need, cardiac arrest and stroke. .. Patient agreeable for STEVEN and possible cardioversion. DW wife as well  History and physical has been reviewed.  There have been no significant clinical changes since the completion of the originally dated History and Physical.  Will be using moderate sedation.    ------------------------------------------------------------------------------------------------------------------

## 2021-11-14 LAB
ECHO TV REGURGITANT MAX VELOCITY: 257.84 CM/S
ECHO TV REGURGITANT PEAK GRADIENT: 26.59 MMHG

## 2021-11-14 PROCEDURE — 93312 ECHO TRANSESOPHAGEAL: CPT | Performed by: INTERNAL MEDICINE

## 2021-11-14 PROCEDURE — 93320 DOPPLER ECHO COMPLETE: CPT | Performed by: INTERNAL MEDICINE

## 2021-11-14 PROCEDURE — 93325 DOPPLER ECHO COLOR FLOW MAPG: CPT | Performed by: INTERNAL MEDICINE

## 2021-11-16 ENCOUNTER — OFFICE VISIT (OUTPATIENT)
Dept: CARDIOLOGY CLINIC | Age: 71
End: 2021-11-16
Payer: MEDICARE

## 2021-11-16 VITALS
DIASTOLIC BLOOD PRESSURE: 94 MMHG | BODY MASS INDEX: 33.08 KG/M2 | SYSTOLIC BLOOD PRESSURE: 148 MMHG | RESPIRATION RATE: 18 BRPM | HEART RATE: 66 BPM | TEMPERATURE: 97.2 F | WEIGHT: 224 LBS | OXYGEN SATURATION: 99 %

## 2021-11-16 DIAGNOSIS — I48.91 ATRIAL FIBRILLATION, UNSPECIFIED TYPE (HCC): Primary | ICD-10-CM

## 2021-11-16 DIAGNOSIS — I10 ESSENTIAL HYPERTENSION: ICD-10-CM

## 2021-11-16 PROCEDURE — 99214 OFFICE O/P EST MOD 30 MIN: CPT | Performed by: INTERNAL MEDICINE

## 2021-11-16 PROCEDURE — G8428 CUR MEDS NOT DOCUMENT: HCPCS | Performed by: INTERNAL MEDICINE

## 2021-11-16 PROCEDURE — G8536 NO DOC ELDER MAL SCRN: HCPCS | Performed by: INTERNAL MEDICINE

## 2021-11-16 PROCEDURE — G8417 CALC BMI ABV UP PARAM F/U: HCPCS | Performed by: INTERNAL MEDICINE

## 2021-11-16 PROCEDURE — G8753 SYS BP > OR = 140: HCPCS | Performed by: INTERNAL MEDICINE

## 2021-11-16 PROCEDURE — G8510 SCR DEP NEG, NO PLAN REQD: HCPCS | Performed by: INTERNAL MEDICINE

## 2021-11-16 PROCEDURE — 1101F PT FALLS ASSESS-DOCD LE1/YR: CPT | Performed by: INTERNAL MEDICINE

## 2021-11-16 PROCEDURE — G8755 DIAS BP > OR = 90: HCPCS | Performed by: INTERNAL MEDICINE

## 2021-11-16 PROCEDURE — 3017F COLORECTAL CA SCREEN DOC REV: CPT | Performed by: INTERNAL MEDICINE

## 2021-11-16 RX ORDER — LOSARTAN POTASSIUM 50 MG/1
50 TABLET ORAL DAILY
Qty: 90 TABLET | Refills: 3 | Status: SHIPPED | OUTPATIENT
Start: 2021-11-16

## 2021-11-16 NOTE — PROGRESS NOTES
[facility]    Mr. Juan Antonio Flynn is a 80 y.o. male with a history of diabetes, hypertension, hyperlipidemia and DJD, sleep apnea    Patient is here for cardiac evaluation. He denies any prior history of MI or CHF. Patient has known history of atrial fibrillation. Because of symptomatic A. fib, he underwent STEVEN guided cardioversion in 10/2021 with return to sinus rhythm. Today he feels overall better. He thinks that his dizziness has improved significantly. He denies any palpitation. He is less fatigue and tired. He denies any symptoms concerning for angina or heart failure. He denies any palpitation, presyncope or syncope. Past Medical History:   Diagnosis Date    Arrhythmia     Atrial Fib    Arthritis     Degenerative disc disease, lumbar     Diabetes ([address]     HLD (hyperlipidemia)     Hypertension     Intracranial hemorrhage, cerebellar ([address] 2018    Hypertensive crisis, non-traumatic    PPD positive, treated 2018    was treated    Sleep apnea     on cpap    Spondylosis of lumbar spine          Past Surgical History:   Procedure Laterality Date    COLONOSCOPY  10/13/2015 Return 10/14/2025    Dr. Kathleen Wilson; screening    HX COLONOSCOPY      HX KNEE ARTHROSCOPY Right 1986    right knee    HX KNEE REPLACEMENT Left 01/2020    HX OTHER SURGICAL      cyst removed from chin    HX WRIST FRACTURE TX Left     ORIF       Current Outpatient Medications   Medication Sig    metoprolol tartrate (LOPRESSOR) 25 mg tablet Take 1.5 Tablets by mouth two (2) times a day.  apixaban (ELIQUIS) 5 mg tablet Take 1 Tab by mouth two (2) times a day.  atorvastatin (LIPITOR) 10 mg tablet Take 1 Tab by mouth daily.  losartan (COZAAR) 50 mg tablet Take 1 Tab by mouth daily.  (Patient taking differently: Take 25 mg by mouth daily.)    oxyCODONE IR (ROXICODONE) 5 mg immediate release tablet Take 5 mg by mouth every eight (8) hours as needed for Pain. 1-2 tabs every 8 hours prn pain    acetaminophen (TYLENOL) 500 mg tablet Take 500 mg by mouth every six (6) hours as needed for Pain.  HYDROcodone-acetaminophen (NORCO) 5-325 mg per tablet Take  by mouth two (2) times daily as needed.  cetirizine (ZYRTEC) 10 mg tablet Take 1 Tab by mouth daily.  metFORMIN ER (GLUCOPHAGE XR) 500 mg tablet Take 2 tabs po daily with a meal (Patient taking differently: two (2) times a day.)    SITagliptin (JANUVIA) 50 mg tablet Take 1 Tab by mouth daily.  sildenafil citrate (VIAGRA) 100 mg tablet Take 1 Tab by mouth as needed (E.D.). Once daily    tamsulosin (FLOMAX) 0.4 mg capsule Take 1 Cap by mouth daily.  melatonin 5 mg tablet Take 5 mg by mouth nightly. No current facility-administered medications for this visit. Allergies and Sensitivities:  No Known Allergies    Family History:  Family History   Problem Relation Age of Onset    No Known Problems Mother     No Known Problems Father        Social History:  Social History     Tobacco Use    Smoking status: Never Smoker    Smokeless tobacco: Never Used   Substance Use Topics    Alcohol use: Yes     Alcohol/week: 1.7 standard drinks     Types: 2 Cans of beer per week    Drug use: No     He  reports that he has never smoked. He has never used smokeless tobacco.  He  reports current alcohol use of about 1.7 standard drinks of alcohol per week. Review of Systems:  Cardiac symptoms as noted above in HPI. All others negative. Physical Exam:  BP Readings from Last 3 Encounters:   11/12/21 (!) 96/55   11/09/21 124/79   05/04/21 (!) 130/91         Pulse Readings from Last 3 Encounters:   11/12/21 63   11/09/21 76   05/04/21 89          Wt Readings from Last 3 Encounters:   11/12/21 99.8 kg (220 lb)   11/12/21 98.9 kg (218 lb)   11/09/21 100.2 kg (221 lb)       Objective:   Vital Signs: (As obtained by patient/caregiver at home)  There were no vitals taken for this visit. Constitutional: Oriented to person, place, and time. HENT: Head: Normocephalic and atraumatic. Neck: No JVD present. Cardiovascular: Regular rhythm. No murmur, gallop or rubs appreciated  Lung: Breath sounds normal. No respiratory distress. No ronchi or rales appreciated  Abdominal: No tenderness. No rebound and no guarding. Musculoskeletal: There is no lower extremity edema. No cynosis    Review of Data  LABS:   Lab Results   Component Value Date/Time    Sodium 138 11/10/2021 12:57 PM    Potassium 3.7 11/10/2021 12:57 PM    Chloride 102 11/10/2021 12:57 PM    CO2 29 11/10/2021 12:57 PM    Glucose 114 (H) 11/10/2021 12:57 PM    BUN 16 11/10/2021 12:57 PM    Creatinine 0.93 11/10/2021 12:57 PM     Lipids Latest Ref Rng & Units 6/25/2019 7/13/2018 1/18/2018   Chol, Total <200 MG/ 168 167   HDL 40 - 60 MG/DL 48 60 44   LDL 0 - 100 MG/DL 44.4 92.8 93   Trig <150 MG/ 76 151(H)   Chol/HDL Ratio 0 - 5.0   2.4 2.8 -   Some recent data might be hidden     Lab Results   Component Value Date/Time    ALT (SGPT) 52 05/11/2021 11:29 AM     Lab Results   Component Value Date/Time    Hemoglobin A1c 6.2 (H) 10/23/2020 01:31 PM    Hemoglobin A1c (POC) 5.8 08/11/2015 08:45 AM       EKG  Sinus rhythm. Incomplete right bundle branch block. No ST changes of ischemia    ECHO (12/2020)  Left Ventricle Normal cavity size and wall thickness. Wall motion: normal. The estimated EF is 50 - 55%. Visually measured ejection fraction. Low normal systolic function. Atrial fibrillation observed. Wall Scoring The left ventricular wall motion is normal.            Left Atrium Moderately dilated left atrium. Left Atrium volume index is 44.03 mL/m2. Right Ventricle Normal global systolic function. Dilated right ventricle. Right Atrium Dilated right atrium. Aortic Valve Trileaflet valve structure and no stenosis. Mild aortic valve regurgitation. Mitral Valve No stenosis. Mitral valve non-specific thickening.  Mild regurgitation. Tricuspid Valve Normal valve structure and no stenosis. Mild regurgitation. Pulmonic Valve Normal valve structure, no stenosis and no regurgitation. Aorta Normal aortic root and ascending aorta. Pulmonary Artery Pulmonary arterial systolic pressure (PASP) is 35 mmHg. Pulmonary hypertension not suggested by Doppler findings. STRESS TEST (01/20)  · Baseline ECG: Sinus rhythm. · Gated SPECT: Left ventricular function post-stress was normal. Calculated ejection fraction is 68%. · Myocardial perfusion imaging supports a low risk stress test.  · Technically difficult study. Inferior perfusion defect is most consistent with diaphragmatic attenuation artifact. There was no convincing evidence of significant reversible defect to suggest ongoing major ischemia. IMPRESSION & PLAN:  Mr. La Shah is 80 y.o. male with multiple medical problem    Hypertension:  Blood pressure is slightly elevated. Currently on losartan 25 mg daily and beta-blocker 37 5 mg twice daily. Have asked patient to keep checking blood pressure at home on a daily basis and make a diary and bring us record in 3 weeks. Chlorthalidone was discontinued in the past because of dizziness and amlodipine was discontinued because of edema    Atrial fibrillation:  Confirmed by EKG in August 2020  Status post STEVEN guided cardioversion in 11/2021 with return to normal rhythm  Appears to be in sinus rhythm by exam today. EKG will be performed  Continue beta-blocker and anticoagulation. I    Hyperlipidemia:  Currently on atorvastatin. Last LDL 44. Continue same PCP is checking lipid profile regularly    Diabetes:  Goal hemoglobin A1c less than 7 is recommended. Defer to PCP. Last A1c less 6.2 per patient    Thank you for allowing me to participate in patient care. Please feel free to call me if you have any question or concern. Jeison Hansen MD  Please note: This document has been produced using voice recognition software. Unrecognized errors in transcription may be present.

## 2021-11-16 NOTE — PROGRESS NOTES
Identified pt with two pt identifiers(name and ). Reviewed record in preparation for visit and have obtained necessary documentation. [facility] presents today for   Chief Complaint   Patient presents with   [facility] preferred language for health care discussion is english/other. Personal Protective Equipment:   Personal Protective Equipment was used including: mask-surgical and hands-gloves. Patient was placed on no precaution(s). Patient was masked. Precautions:   Patient currently on None  Patient currently roomed with door closed    Is someone accompanying this pt? Yes wife    Is the patient using any DME equipment during [address]? no    Depression Screening:  3 most recent PHQ Screens 2021   Little interest or pleasure in doing things Not at all   Feeling down, depressed, irritable, or hopeless Not at all   Total Score PHQ 2 0       Learning Assessment:  Learning Assessment 2015   PRIMARY LEARNER Patient   HIGHEST LEVEL OF EDUCATION - PRIMARY LEARNER  SOME COLLEGE   BARRIERS PRIMARY LEARNER NONE   CO-LEARNER CAREGIVER No   PRIMARY LANGUAGE ENGLISH    NEED No   LEARNER PREFERENCE PRIMARY READING   LEARNING SPECIAL TOPICS no   ANSWERED BY self   RELATIONSHIP SELF       Abuse Screening:  Abuse Screening Questionnaire 2020   Do you ever feel afraid of your partner? N   Are you in a relationship with someone who physically or mentally threatens you? N   Is it safe for you to go home? Y       Fall Risk  Fall Risk Assessment, last 12 mths 2021   Able to walk? Yes   Fall in past 12 months? -   Do you feel unsteady? -   Are you worried about falling -       Pt currently taking Anticoagulant therapy? no    Coordination of Care:  1. Have you been to the ER, urgent care clinic since your last visit? Hospitalized since your last visit? no    2.  Have you seen or consulted any other health care providers outside of the [facility] since your last visit? Include any pap smears or colon screening. no      Please see Red banners under Allergies and Med Rec to remove outside inquires. All correct information has been verified with patient and added to chart.      Medication's patient's would liked removed has been marked not taking to be removed per Verbal order and read back per Cristiane March MD

## 2021-11-16 NOTE — LETTER
11/16/2021    Patient: Brayden Horan   YOB: 1950   Date of Visit: 11/16/2021     [address] Wei [address]  [address]  [place] [facility] [address]    Dear Kathleen Rodrigues PA-C,      Thank you for referring Mr. Donna Grimes to CARDIO SPECIALIST AT [facility] for evaluation. My notes for this consultation are attached. If you have questions, please do not hesitate to call me. I look forward to following your patient along with you.       Sincerely,    Nury Hayes MD

## 2021-12-13 ENCOUNTER — CLINICAL SUPPORT (OUTPATIENT)
Dept: CARDIOLOGY CLINIC | Age: 71
End: 2021-12-13

## 2021-12-13 VITALS — DIASTOLIC BLOOD PRESSURE: 63 MMHG | HEART RATE: 63 BPM | SYSTOLIC BLOOD PRESSURE: 132 MMHG

## 2021-12-13 DIAGNOSIS — Z01.30 BLOOD PRESSURE CHECK: Primary | ICD-10-CM

## 2021-12-13 NOTE — PROGRESS NOTES
Leland Jimenez was seen in our office today for BP evaluation. Listed below are the patients current meds:      Current Outpatient Medications:     losartan (COZAAR) 50 mg tablet, Take 1 Tablet by mouth daily. , Disp: 90 Tablet, Rfl: 3    metoprolol tartrate (LOPRESSOR) 25 mg tablet, Take 1.5 Tablets by mouth two (2) times a day., Disp: 180 Tablet, Rfl: 3    oxyCODONE IR (ROXICODONE) 5 mg immediate release tablet, Take 5 mg by mouth every eight (8) hours as needed for Pain. 1-2 tabs every 8 hours prn pain, Disp: , Rfl:     apixaban (ELIQUIS) 5 mg tablet, Take 1 Tab by mouth two (2) times a day., Disp: 60 Tab, Rfl: 3    atorvastatin (LIPITOR) 10 mg tablet, Take 1 Tab by mouth daily. , Disp: 90 Tab, Rfl: 3    acetaminophen (TYLENOL) 500 mg tablet, Take 500 mg by mouth every six (6) hours as needed for Pain., Disp: , Rfl:     HYDROcodone-acetaminophen (NORCO) 5-325 mg per tablet, Take  by mouth two (2) times daily as needed. , Disp: , Rfl:     cetirizine (ZYRTEC) 10 mg tablet, Take 1 Tab by mouth daily. , Disp: 90 Tab, Rfl: 0    metFORMIN ER (GLUCOPHAGE XR) 500 mg tablet, Take 2 tabs po daily with a meal (Patient taking differently: two (2) times a day.), Disp: 180 Tab, Rfl: 0    SITagliptin (JANUVIA) 50 mg tablet, Take 1 Tab by mouth daily. , Disp: 90 Tab, Rfl: 0    sildenafil citrate (VIAGRA) 100 mg tablet, Take 1 Tab by mouth as needed (E.D.). Once daily, Disp: 12 Tab, Rfl: 1    tamsulosin (FLOMAX) 0.4 mg capsule, Take 1 Cap by mouth daily. , Disp: 90 Cap, Rfl: 1    melatonin 5 mg tablet, Take 5 mg by mouth nightly., Disp: , Rfl:       No current facility-administered medications for this visit. Visit Vitals  /63   Pulse 63       Plan:     Patient to continue current medications. Advised patient that I would forward to Dr. Sabrina Morataya for review and further instructions. Advised patient that we would call within 24-48 hours with any changes. Patient verbalized understanding.

## 2022-03-01 ENCOUNTER — OFFICE VISIT (OUTPATIENT)
Dept: CARDIOLOGY CLINIC | Age: 72
End: 2022-03-01
Payer: MEDICARE

## 2022-03-01 VITALS
HEART RATE: 61 BPM | DIASTOLIC BLOOD PRESSURE: 71 MMHG | OXYGEN SATURATION: 97 % | SYSTOLIC BLOOD PRESSURE: 128 MMHG | WEIGHT: 219 LBS | RESPIRATION RATE: 16 BRPM | TEMPERATURE: 98.4 F | BODY MASS INDEX: 32.33 KG/M2

## 2022-03-01 DIAGNOSIS — E78.00 PURE HYPERCHOLESTEROLEMIA: ICD-10-CM

## 2022-03-01 DIAGNOSIS — I10 ESSENTIAL HYPERTENSION WITH GOAL BLOOD PRESSURE LESS THAN 140/90: Primary | ICD-10-CM

## 2022-03-01 DIAGNOSIS — I48.0 PAF (PAROXYSMAL ATRIAL FIBRILLATION) (HCC): ICD-10-CM

## 2022-03-01 PROCEDURE — G8754 DIAS BP LESS 90: HCPCS | Performed by: INTERNAL MEDICINE

## 2022-03-01 PROCEDURE — G8432 DEP SCR NOT DOC, RNG: HCPCS | Performed by: INTERNAL MEDICINE

## 2022-03-01 PROCEDURE — 1101F PT FALLS ASSESS-DOCD LE1/YR: CPT | Performed by: INTERNAL MEDICINE

## 2022-03-01 PROCEDURE — G8752 SYS BP LESS 140: HCPCS | Performed by: INTERNAL MEDICINE

## 2022-03-01 PROCEDURE — 99214 OFFICE O/P EST MOD 30 MIN: CPT | Performed by: INTERNAL MEDICINE

## 2022-03-01 PROCEDURE — 3017F COLORECTAL CA SCREEN DOC REV: CPT | Performed by: INTERNAL MEDICINE

## 2022-03-01 PROCEDURE — G8536 NO DOC ELDER MAL SCRN: HCPCS | Performed by: INTERNAL MEDICINE

## 2022-03-01 PROCEDURE — G8417 CALC BMI ABV UP PARAM F/U: HCPCS | Performed by: INTERNAL MEDICINE

## 2022-03-01 PROCEDURE — G8428 CUR MEDS NOT DOCUMENT: HCPCS | Performed by: INTERNAL MEDICINE

## 2022-03-01 NOTE — PROGRESS NOTES
[facility]iovascular Specialists    Mr. Elliot Greco is a 80 y.o. male with a history of diabetes, hypertension, hyperlipidemia and DJD, sleep apnea    Patient is here for cardiac evaluation. He denies any prior history of MI or CHF. Patient has known history of atrial fibrillation. Because of symptomatic A. fib, he underwent STEVEN guided cardioversion in 10/2021 with return to sinus rhythm. Patient recently had to go to emergency department after having gross hematuria. He was told to stop Eliquis for 2 days. After stopping Eliquis for 2 days, his hematuria resolved. He was seen by urology team yesterday and work-up has been ordered. He denies any symptoms that is concerning for angina or heart failure at this time. His blood pressure at home is slightly elevated in the range of 140-150s. Denies any palpitation, presyncope or syncope    Past Medical History:   Diagnosis Date    Arrhythmia     Atrial Fib    Arthritis     Degenerative disc disease, lumbar     Diabetes ([address]     HLD (hyperlipidemia)     Hypertension     Intrac[address] hemorrhage, [address] ([facility] [address] 2018    Hypertensive crisis, non-traumatic    Liver disease     Obstructive sleep apnea 02/20/2018    mild AHI 7 REM AHI 14  min on 2/21/2018 on CPAP    PPD positive, treated 2018    was treated    Sleep apnea     on cpap    Spondylosis of lumbar spine          Past Surgical History:   Procedure Laterality Date    COLONOSCOPY  10/13/2015 Return 10/14/2025    Dr. Mark Oh; screening    HX COLONOSCOPY      HX KNEE ARTHROSCOPY Right 1986    right knee    HX KNEE REPLACEMENT Left 01/2020    HX OTHER SURGICAL      cyst removed from chin    HX WRIST FRACTURE TX Left     ORIF       Current Outpatient Medications   Medication Sig    losartan (COZAAR) 50 mg tablet Take 1 Tablet by mouth daily.     metoprolol tartrate (LOPRESSOR) 25 mg tablet Take 1.5 Tablets by mouth two (2) times a day.    oxyCODONE IR (ROXICODONE) 5 mg immediate release tablet Take 5 mg by mouth every eight (8) hours as needed for Pain. 1-2 tabs every 8 hours prn pain    apixaban (ELIQUIS) 5 mg tablet Take 1 Tab by mouth two (2) times a day.  atorvastatin (LIPITOR) 10 mg tablet Take 1 Tab by mouth daily.  HYDROcodone-acetaminophen (NORCO) 5-325 mg per tablet Take  by mouth two (2) times daily as needed.  cetirizine (ZYRTEC) 10 mg tablet Take 1 Tab by mouth daily.  metFORMIN ER (GLUCOPHAGE XR) 500 mg tablet Take 2 tabs po daily with a meal (Patient taking differently: two (2) times a day.)    SITagliptin (JANUVIA) 50 mg tablet Take 1 Tab by mouth daily.  sildenafil citrate (VIAGRA) 100 mg tablet Take 1 Tab by mouth as needed (E.D.). Once daily    tamsulosin (FLOMAX) 0.4 mg capsule Take 1 Cap by mouth daily. No current facility-administered medications for this visit. Allergies and Sensitivities:  No Known Allergies    Family History:  Family History   Problem Relation Age of Onset    No Known Problems Mother     No Known Problems Father        Social History:  Social History     Tobacco Use    Smoking status: Never Smoker    Smokeless tobacco: Never Used   Substance Use Topics    Alcohol use: Yes     Alcohol/week: 1.7 standard drinks     Types: 2 Cans of beer per week    Drug use: No     He  reports that he has never smoked. He has never used smokeless tobacco.  He  reports current alcohol use of about 1.7 standard drinks of alcohol per week. Review of Systems:  Cardiac symptoms as noted above in HPI. All others negative.     Physical Exam:  BP Readings from Last 3 Encounters:   12/13/21 132/63   11/16/21 (!) 148/94   11/12/21 (!) 96/55         Pulse Readings from Last 3 Encounters:   12/13/21 63   11/16/21 66   11/12/21 63          Wt Readings from Last 3 Encounters:   02/28/22 101.6 kg (224 lb)   11/16/21 101.6 kg (224 lb)   11/12/21 99.8 kg (220 lb)       Objective:     Vital Signs: (As obtained by patient/caregiver at home)  There were no vitals taken for this visit. Constitutional: Oriented to person, place, and time. HENT: Head: Normocephalic and atraumatic. Neck: No JVD present. Cardiovascular: Regular rhythm. No murmur, gallop or rubs appreciated  Lung: Breath sounds normal. No respiratory distress. No ronchi or rales appreciated  Abdominal: No tenderness. No rebound and no guarding. Musculoskeletal: There is no lower extremity edema. No cynosis    Review of Data  LABS:   Lab Results   Component Value Date/Time    Sodium 138 11/10/2021 12:57 PM    Potassium 3.7 11/10/2021 12:57 PM    Chloride 102 11/10/2021 12:57 PM    CO2 29 11/10/2021 12:57 PM    Glucose 114 (H) 11/10/2021 12:57 PM    BUN 16 11/10/2021 12:57 PM    Creatinine 0.93 11/10/2021 12:57 PM     Lipids Latest Ref Rng & Units 6/25/2019 7/13/2018   Chol, Total <200 MG/ 168   HDL 40 - 60 MG/DL 48 60   LDL 0 - 100 MG/DL 44.4 92.8   Trig <150 MG/ 76   Chol/HDL Ratio 0 - 5.0   2.4 2.8   Some recent data might be hidden     Lab Results   Component Value Date/Time    ALT (SGPT) 52 05/11/2021 11:29 AM     Lab Results   Component Value Date/Time    Hemoglobin A1c 6.2 (H) 10/23/2020 01:31 PM    Hemoglobin A1c (POC) 5.8 08/11/2015 08:45 AM       EKG  Sinus rhythm. Incomplete right bundle branch block. No ST changes of ischemia    ECHO (12/2020)  Left Ventricle Normal cavity size and wall thickness. Wall motion: normal. The estimated EF is 50 - 55%. Visually measured ejection fraction. Low normal systolic function. Atrial fibrillation observed. Wall Scoring The left ventricular wall motion is normal.            Left Atrium Moderately dilated left atrium. Left Atrium volume index is 44.03 mL/m2. Right Ventricle Normal global systolic function. Dilated right ventricle. Right Atrium Dilated right atrium. Aortic Valve Trileaflet valve structure and no stenosis. Mild aortic valve regurgitation.    Mitral Valve No stenosis. Mitral valve non-specific thickening. Mild regurgitation. Tricuspid Valve Normal valve structure and no stenosis. Mild regurgitation. Pulmonic Valve Normal valve structure, no stenosis and no regurgitation. Aorta Normal aortic root and ascending aorta. Pulmonary Artery Pulmonary arterial systolic pressure (PASP) is 35 mmHg. Pulmonary hypertension not suggested by Doppler findings. STRESS TEST (01/20)  · Baseline ECG: Sinus rhythm. · Gated SPECT: Left ventricular function post-stress was normal. Calculated ejection fraction is 68%. · Myocardial perfusion imaging supports a low risk stress test.  · Technically difficult study. Inferior perfusion defect is most consistent with diaphragmatic attenuation artifact. There was no convincing evidence of significant reversible defect to suggest ongoing major ischemia. IMPRESSION & PLAN:  Mr. Shona Johnson is 80 y.o. male with multiple medical problem    Hypertension:  Blood pressure is slightly elevated. Currently on losartan 25 mg daily and beta-blocker 37 5 mg twice daily. I will ask him to increase his losartan to 37.5 mg daily. Continue beta-blocker same dose. He will keep checking his blood pressure at home. Chlorthalidone was discontinued in the past because of dizziness and amlodipine was discontinued because of edema    Atrial fibrillation:  Confirmed by EKG in August 2020  Status post STEVEN guided cardioversion in 11/2021 with return to normal rhythm  Appears to be in sinus rhythm by exam today. EKG will be performed  Continue beta-blocker and anticoagulation. Recently had hematuria which resolved after stopping Eliquis for 2 days. Now he is taking Eliquis back. He is seeing urology team and work-up has been obtained and ordered. For now continue with anticoagulation and beta-blocker. If recurrent hematuria, will need to consider discontinuing Eliquis.   Will wait for the urology recommendation    Hyperlipidemia:  Currently on atorvastatin. Last LDL 44. Diabetes:  Goal hemoglobin A1c less than 7 is recommended. Defer to PCP. Last A1c less 6.2 per patient    Thank you for allowing me to participate in patient care. Please feel free to call me if you have any question or concern. Bruna Nieto MD  Please note: This document has been produced using voice recognition software. Unrecognized errors in transcription may be present.

## 2022-03-01 NOTE — PROGRESS NOTES
Identified pt with two pt identifiers(name and ). Reviewed record in preparation for visit and have obtained necessary documentation. Darwin Oviedo presents today for   Chief Complaint   Patient presents with    Follow-up     3m                   Darwin Oviedo preferred language for health care discussion is english/other. Personal Protective Equipment:   Personal Protective Equipment was used including: mask-surgical and hands-gloves. Patient was placed on no precaution(s). Patient was masked. Precautions:   Patient currently on None  Patient currently roomed with door closed. Is someone accompanying this pt?  wife    Is the patient using any DME equipment during [address]? no  Depression Screening:  3 most recent PHQ Screens 2021   Little interest or pleasure in doing things Not at all   Feeling down, depressed, irritable, or hopeless Not at all   Total Score PHQ 2 0       Learning Assessment:  Learning Assessment 2015   PRIMARY LEARNER Patient   HIGHEST LEVEL OF EDUCATION - PRIMARY LEARNER  SOME COLLEGE   BARRIERS PRIMARY LEARNER NONE   CO-LEARNER CAREGIVER No   PRIMARY LANGUAGE ENGLISH    NEED No   LEARNER PREFERENCE PRIMARY READING   LEARNING SPECIAL TOPICS no   ANSWERED BY self   RELATIONSHIP SELF       Abuse Screening:  Abuse Screening Questionnaire 2020   Do you ever feel afraid of your partner? N   Are you in a relationship with someone who physically or mentally threatens you? N   Is it safe for you to go home? Y       Fall Risk  Fall Risk Assessment, last 12 mths 2021   Able to walk? Yes   Fall in past 12 months? -   Do you feel unsteady? -   Are you worried about falling -       Pt currently taking Anticoagulant therapy? yes  Pt currently taking Antiplatelet therapy? no    Coordination of Care:  1. Have you been to the ER, urgent care clinic since your last visit? Hospitalized since your last visit? no    2.  Have you seen or consulted any other health care providers outside of the [address] [place] [address] since your last visit? Include any pap smears or colon screening. yes      Please see Red banners under Allergies and Med Rec to remove outside inquires. All correct information has been verified with patient and added to chart.      Medication's patient's would liked removed has been marked not taking to be removed per Verbal order and read back per Gretchen Mccloud MD

## 2022-03-01 NOTE — LETTER
3/1/2022    Patient: Ana Lilia Spence   YOB: 1950   Date of Visit: 3/1/2022     [address]  [address]  [place] [facility] [address]    Dear Alondra Bowers PA-C,      Thank you for referring Mr. Sandy Bejarano to CARDIO SPECIALIST AT [facility] for evaluation. My notes for this consultation are attached. If you have questions, please do not hesitate to call me. I look forward to following your patient along with you.       Sincerely,    Eleanor Chen MD

## 2022-03-01 NOTE — PATIENT INSTRUCTIONS
New Medication/Medication Changes  Losartan  37.5 mg tab daily ( 1.5 tab )    **please allow 24-48 hrs for medication to be escribed to pharmacy** If you need any refills on medications please contact your pharmacy so that the request can be escribed to the provider for review.

## 2022-03-18 PROBLEM — M20.21 HALLUX RIGIDUS OF BOTH FEET: Status: ACTIVE | Noted: 2019-01-28

## 2022-03-18 PROBLEM — L84 FOOT CALLUS: Status: ACTIVE | Noted: 2019-01-28

## 2022-03-18 PROBLEM — M20.22 HALLUX RIGIDUS OF BOTH FEET: Status: ACTIVE | Noted: 2019-01-28

## 2022-03-18 PROBLEM — I10 ESSENTIAL HYPERTENSION: Status: ACTIVE | Noted: 2018-02-21

## 2022-03-19 PROBLEM — M20.42 HAMMER TOES OF BOTH FEET: Status: ACTIVE | Noted: 2019-01-28

## 2022-03-19 PROBLEM — M17.9 KNEE OSTEOARTHRITIS: Status: ACTIVE | Noted: 2020-01-23

## 2022-03-19 PROBLEM — G47.33 OBSTRUCTIVE SLEEP APNEA: Status: ACTIVE | Noted: 2019-06-30

## 2022-03-19 PROBLEM — E11.9 TYPE 2 DIABETES MELLITUS WITHOUT COMPLICATION, WITHOUT LONG-TERM CURRENT USE OF INSULIN (HCC): Status: ACTIVE | Noted: 2017-07-12

## 2022-03-19 PROBLEM — M20.41 HAMMER TOES OF BOTH FEET: Status: ACTIVE | Noted: 2019-01-28

## 2022-09-27 ENCOUNTER — OFFICE VISIT (OUTPATIENT)
Dept: CARDIOLOGY CLINIC | Age: 72
End: 2022-09-27
Payer: MEDICARE

## 2022-09-27 ENCOUNTER — TELEPHONE (OUTPATIENT)
Dept: CARDIOLOGY CLINIC | Age: 72
End: 2022-09-27

## 2022-09-27 VITALS
TEMPERATURE: 97 F | SYSTOLIC BLOOD PRESSURE: 102 MMHG | HEART RATE: 69 BPM | BODY MASS INDEX: 31.96 KG/M2 | WEIGHT: 216.4 LBS | OXYGEN SATURATION: 98 % | DIASTOLIC BLOOD PRESSURE: 80 MMHG

## 2022-09-27 DIAGNOSIS — Z01.818 PREOP TESTING: Primary | ICD-10-CM

## 2022-09-27 DIAGNOSIS — R42 DIZZINESS: Primary | ICD-10-CM

## 2022-09-27 PROCEDURE — 1101F PT FALLS ASSESS-DOCD LE1/YR: CPT | Performed by: INTERNAL MEDICINE

## 2022-09-27 PROCEDURE — 1123F ACP DISCUSS/DSCN MKR DOCD: CPT | Performed by: INTERNAL MEDICINE

## 2022-09-27 PROCEDURE — G8752 SYS BP LESS 140: HCPCS | Performed by: INTERNAL MEDICINE

## 2022-09-27 PROCEDURE — G8536 NO DOC ELDER MAL SCRN: HCPCS | Performed by: INTERNAL MEDICINE

## 2022-09-27 PROCEDURE — G8428 CUR MEDS NOT DOCUMENT: HCPCS | Performed by: INTERNAL MEDICINE

## 2022-09-27 PROCEDURE — G8510 SCR DEP NEG, NO PLAN REQD: HCPCS | Performed by: INTERNAL MEDICINE

## 2022-09-27 PROCEDURE — 93000 ELECTROCARDIOGRAM COMPLETE: CPT | Performed by: INTERNAL MEDICINE

## 2022-09-27 PROCEDURE — G8754 DIAS BP LESS 90: HCPCS | Performed by: INTERNAL MEDICINE

## 2022-09-27 PROCEDURE — 3017F COLORECTAL CA SCREEN DOC REV: CPT | Performed by: INTERNAL MEDICINE

## 2022-09-27 PROCEDURE — 99215 OFFICE O/P EST HI 40 MIN: CPT | Performed by: INTERNAL MEDICINE

## 2022-09-27 PROCEDURE — G8417 CALC BMI ABV UP PARAM F/U: HCPCS | Performed by: INTERNAL MEDICINE

## 2022-09-27 RX ORDER — AMIODARONE HYDROCHLORIDE 200 MG/1
200 TABLET ORAL DAILY
Qty: 90 TABLET | Refills: 3 | Status: SHIPPED | OUTPATIENT
Start: 2022-09-27

## 2022-09-27 NOTE — TELEPHONE ENCOUNTER
Incoming from pt. Two patient Identifiers confirmed. Advised pt labs pended and needed to be completed within 7 days of procedure. Pts wife stated she would like for labs to me faxed to [facility] and mailed to their address of file. Address confirmed. No other issues noted.

## 2022-09-27 NOTE — PROGRESS NOTES
[facility]    Mr. Eze Spangler is a 71 y.o. male with a history of diabetes, hypertension, hyperlipidemia and DJD, sleep apnea    Patient is here for cardiac evaluation. He denies any prior history of MI or CHF. Patient has known history of atrial fibrillation. Because of symptomatic A. fib, he underwent STEVEN guided cardioversion in 10/2021 with return to sinus rhythm. He denies symptoms concerning for angina. He feels that he has been feeling more fatigue and tired lately. He is not able to keep himself up. He has mild dyspnea on moderate exertion. He feels like he may be back in A. fib and this may be related to it. He felt much better after cardioversion and restoring sinus rhythm    Past Medical History:   Diagnosis Date    A-fib [facility])     Arthritis     Degenerative disc disease, lumbar     Diabetes ([facility]     HLD (hyperlipidemia)     Hypertension     Intracranial hemorrhage, cerebellar ([facility] 2018    Hypertensive crisis, non-traumatic    Liver disease     Obstructive sleep apnea 02/20/2018    mild AHI 7 REM AHI 14  min on 2/21/2018 on CPAP    PPD positive, treated 2018    was treated    Sleep apnea     on cpap    Spondylosis of lumbar spine          Past Surgical History:   Procedure Laterality Date    COLONOSCOPY  10/13/2015 Return 10/14/2025    Dr. Dori Barlow; screening    HX COLONOSCOPY      HX KNEE ARTHROSCOPY Right 1986    right knee    HX KNEE REPLACEMENT Left 01/2020    HX OTHER SURGICAL      cyst removed from chin    HX WRIST FRACTURE TX Left     ORIF       Current Outpatient Medications   Medication Sig    losartan (COZAAR) 50 mg tablet Take 1 Tablet by mouth daily. (Patient taking differently: Take 37.5 mg by mouth daily.)    metoprolol tartrate (LOPRESSOR) 25 mg tablet Take 1.5 Tablets by mouth two (2) times a day. apixaban (ELIQUIS) 5 mg tablet Take 1 Tab by mouth two (2) times a day.     atorvastatin (LIPITOR) 10 mg tablet Take 1 Tab by mouth daily. SITagliptin (JANUVIA) 50 mg tablet Take 1 Tab by mouth daily. terbinafine HCL (LAMISIL) 1 % topical cream     clotrimazole-betamethasone (LOTRISONE) topical cream APPLY TOPICALLY TO THE AFFECTED AREA TWICE DAILY FOR 14 DAYS    ciclopirox (PENLAC) 8 % solution     cephALEXin (KEFLEX) 500 mg capsule Take 1 Capsule by mouth four (4) times daily. tamsulosin (FLOMAX) 0.4 mg capsule Take 1 Capsule by mouth two (2) times a day. HYDROcodone-acetaminophen (NORCO) 5-325 mg per tablet Take  by mouth two (2) times daily as needed. cetirizine (ZYRTEC) 10 mg tablet Take 1 Tab by mouth daily. metFORMIN ER (GLUCOPHAGE XR) 500 mg tablet Take 2 tabs po daily with a meal (Patient taking differently: two (2) times a day.)    sildenafil citrate (VIAGRA) 100 mg tablet Take 1 Tab by mouth as needed (E.D.). Once daily     No current facility-administered medications for this visit. Allergies and Sensitivities:  No Known Allergies    Family History:  Family History   Problem Relation Age of Onset    No Known Problems Mother     No Known Problems Father        Social History:  Social History     Tobacco Use    Smoking status: Never    Smokeless tobacco: Never   Substance Use Topics    Alcohol use: Yes     Alcohol/week: 1.7 standard drinks     Types: 2 Cans of beer per week    Drug use: No     He  reports that he has never smoked. He has never used smokeless tobacco.  He  reports current alcohol use of about 1.7 standard drinks per week. Review of Systems:  Cardiac symptoms as noted above in HPI. All others negative.     Physical Exam:  BP Readings from Last 3 Encounters:   09/27/22 102/80   03/01/22 128/71   12/13/21 132/63         Pulse Readings from Last 3 Encounters:   09/27/22 69   03/01/22 61   12/13/21 63          Wt Readings from Last 3 Encounters:   09/27/22 98.2 kg (216 lb 6.4 oz)   06/14/22 90.7 kg (200 lb)   03/23/22 90.7 kg (200 lb)       Objective: Constitutional: Oriented to person, place, and time. HENT: Head: Normocephalic and atraumatic. Neck: No JVD present. Cardiovascular: Regular rhythm. No murmur, gallop or rubs appreciated  Lung: Breath sounds normal. No respiratory distress. No ronchi or rales appreciated  Abdominal: No tenderness. No rebound and no guarding. Musculoskeletal: There is no lower extremity edema. No cynosis    Review of Data  LABS:   Lab Results   Component Value Date/Time    Sodium 138 11/10/2021 12:57 PM    Potassium 3.7 11/10/2021 12:57 PM    Chloride 102 11/10/2021 12:57 PM    CO2 29 11/10/2021 12:57 PM    Glucose 114 (H) 11/10/2021 12:57 PM    BUN 16 11/10/2021 12:57 PM    Creatinine 0.93 11/10/2021 12:57 PM     Lipids Latest Ref Rng & Units 6/25/2019   Chol, Total <200 MG/   HDL 40 - 60 MG/DL 48   LDL 0 - 100 MG/DL 44.4   Trig <150 MG/   Chol/HDL Ratio 0 - 5.0   2.4   Some recent data might be hidden     Lab Results   Component Value Date/Time    ALT (SGPT) 52 05/11/2021 11:29 AM     Lab Results   Component Value Date/Time    Hemoglobin A1c 6.2 (H) 10/23/2020 01:31 PM    Hemoglobin A1c (POC) 5.8 08/11/2015 08:45 AM       EKG  Sinus rhythm. Incomplete right bundle branch block. No ST changes of ischemia    ECHO (12/2020)  Left Ventricle Normal cavity size and wall thickness. Wall motion: normal. The estimated EF is 50 - 55%. Visually measured ejection fraction. Low normal systolic function. Atrial fibrillation observed. Wall Scoring The left ventricular wall motion is normal.            Left Atrium Moderately dilated left atrium. Left Atrium volume index is 44.03 mL/m2. Right Ventricle Normal global systolic function. Dilated right ventricle. Right Atrium Dilated right atrium. Aortic Valve Trileaflet valve structure and no stenosis. Mild aortic valve regurgitation. Mitral Valve No stenosis. Mitral valve non-specific thickening. Mild regurgitation.    Tricuspid Valve Normal valve structure and no stenosis. Mild regurgitation. Pulmonic Valve Normal valve structure, no stenosis and no regurgitation. Aorta Normal aortic root and ascending aorta. Pulmonary Artery Pulmonary arterial systolic pressure (PASP) is 35 mmHg. Pulmonary hypertension not suggested by Doppler findings. STRESS TEST (01/20)  Baseline ECG: Sinus rhythm. Gated SPECT: Left ventricular function post-stress was normal. Calculated ejection fraction is 68%. Myocardial perfusion imaging supports a low risk stress test.  Technically difficult study. Inferior perfusion defect is most consistent with diaphragmatic attenuation artifact. There was no convincing evidence of significant reversible defect to suggest ongoing major ischemia. IMPRESSION & PLAN:  Mr. Wes Garcia is 71 y.o. male with multiple medical problem    Hypertension: /80. Continue same    Atrial fibrillation:  Confirmed by EKG in August 2020  Status post STEVEN guided cardioversion in 11/2021 with return to normal rhythm  Patient has been feeling more fatigue and tired and short of breath lately. On exam and by EKG he is back in atrial fibrillation. He is symptomatic from A. fib. Will start patient on amiodarone to her milligrams daily. Side effect discussed. We will schedule patient on cardioversion without STEVEN. DW patient about rate control strategy vs rhythm control strategy. Risk , benefit and alternatives and complication of both discussed  Complication assocaited with cardioversion discussed in detail including but not limited to emergent thoracic surgery, pacemaker need, cardiac arrest and stroke. .. Patient agreeable for diversion. As patient has been on Eliquis without any interruption, it is reasonable to proceed with cardioversion without STEVEN. Patient is agreeable    Patient had episode of hematuria in 02/2022. Urology work-up has been negative. No more hematuria. Hyperlipidemia: Currently on atorvastatin. Last LDL 44.   BCP checks this regularly. Diabetes: Goal hemoglobin A1c less than 7 is recommended. Defer to PCP. Thank you for allowing me to participate in patient care. Please feel free to call me if you have any question or concern. Sasha Stern MD  Please note: This document has been produced using voice recognition software. Unrecognized errors in transcription may be present.

## 2022-09-27 NOTE — PROGRESS NOTES
Identified pt with two pt identifiers(name and ). Reviewed record in preparation for visit and have obtained necessary documentation. Mamie Walden presents today for No chief complaint on file. Pt c/o [address] [place] [address] preferred language for health care discussion is english/other. Personal Protective Equipment:   Personal Protective Equipment was used including: mask-surgical and hands-gloves. Patient was placed on no precaution(s). Patient was masked. Precautions:   Patient currently on None  Patient currently roomed with door closed. Is someone accompanying this pt? Yes     Is the patient using any DME equipment during OV? No     Depression Screening:  3 most recent PHQ Screens 2022   Little interest or pleasure in doing things Not at all   Feeling down, depressed, irritable, or hopeless Not at all   Total Score PHQ 2 0       Learning Assessment:  Learning Assessment 2015   PRIMARY LEARNER Patient   HIGHEST LEVEL OF EDUCATION - PRIMARY LEARNER  SOME COLLEGE   BARRIERS PRIMARY LEARNER NONE   CO-LEARNER CAREGIVER No   PRIMARY LANGUAGE ENGLISH    NEED No   LEARNER PREFERENCE PRIMARY READING   LEARNING SPECIAL TOPICS no   ANSWERED BY self   RELATIONSHIP SELF       Abuse Screening:  Abuse Screening Questionnaire 2020   Do you ever feel afraid of your partner? N   Are you in a relationship with someone who physically or mentally threatens you? N   Is it safe for you to go home? Y       Fall Risk  Fall Risk Assessment, last 12 mths 2022   Able to walk? Yes   Fall in past 12 months? 0   Do you feel unsteady? 0   Are you worried about falling 0       Pt currently taking Anticoagulant therapy? yes  Pt currently taking Antiplatelet therapy? No     Coordination of Care:  1. Have you been to the ER, urgent care clinic since your last visit? Hospitalized since your last visit? No     2.  Have you seen or consulted any other health care providers outside of the [address] since your last visit? Include any pap smears or colon screening. Yes       Please see Red banners under Allergies and Med Rec to remove outside inquires. All correct information has been verified with patient and added to chart.      Medication's patient's would liked removed has been marked not taking to be removed per Verbal order and read back per Sebastián Pineda MD

## 2022-09-27 NOTE — TELEPHONE ENCOUNTER
Attempted to contact pt at  number, no answer. Lvm for pt to return call to office at [phone]. Will continue to try to contact pt.

## 2022-09-27 NOTE — PATIENT INSTRUCTIONS
[facility]          Patient  EP Instructions                  You are scheduled to have a Cardioversion on  October 19, 2022 , at 10:30AM.    Please check in at 9:00AM.    Please go to [facility] and park in the outpatient parking lot that is located around to the back of the hospital and enter through the [facility]. Once you enter through the [facility] check in with the  there. The  will either give you directions or assist you in getting to the cath holding area. 3.  You are not to eat or drink anything after midnight the night before your procedure. Please continue to take your medications with a small sip of water on the morning of the procedure with the exception of Januvia. If you are diabetic, do not take your insulin/sugar pill the morning of the procedure (hold Januvia). We encourage families to wait in the waiting room on the first floor while the procedure is being done. The Doctor will come out and talk with you as soon as the procedure is over. There is the possibility that you may spend the night in the hospital, depending on the results of the procedure. This will be determined after the procedure is done. 8.   If you or your family have any questions, please call our office Monday-Friday 9:00am         -4:30 pm , at [phone], and ask to speak to one of the nurses. New Medication/Medication Changes    Amiodarone 200mg daily    **please allow 24-48 hrs for medication to be escribed to pharmacy** If you need any refills on medications please contact your pharmacy so that the request can be escribed to the provider for review.

## 2022-09-27 NOTE — LETTER
9/27/2022    Patient: Joseph Mistry   YOB: 1950   Date of Visit: 9/27/2022     [address]  [address]  [place] [facility] [address]    Dear Awa Pina PA-C,      Thank you for referring Mr. Leticia Glasgow to [facility] for evaluation. My notes for this consultation are attached. If you have questions, please do not hesitate to call me. I look forward to following your patient along with you.       Sincerely,    Dexter Chu MD

## 2022-10-12 LAB
ANION GAP SERPL CALC-SCNC: 14 MMOL/L (ref 3–15)
BUN SERPL-MCNC: 14 MG/DL (ref 6–22)
CALCIUM SERPL-MCNC: 9 MG/DL (ref 8.4–10.5)
CHLORIDE SERPL-SCNC: 99 MMOL/L (ref 98–110)
CO2 SERPL-SCNC: 27 MMOL/L (ref 20–32)
CREAT SERPL-MCNC: 0.9 MG/DL (ref 0.8–1.6)
GLOMERULAR FILTRATION RATE: >60 ML/MIN/1.73 SQ.M.
GLUCOSE SERPL-MCNC: 121 MG/DL (ref 70–99)
POTASSIUM SERPL-SCNC: 3.9 MMOL/L (ref 3.5–5.5)
SODIUM SERPL-SCNC: 140 MMOL/L (ref 133–145)

## 2022-10-19 ENCOUNTER — HOSPITAL ENCOUNTER (OUTPATIENT)
Age: 72
Setting detail: OUTPATIENT SURGERY
Discharge: HOME | End: 2022-10-19
Attending: INTERNAL MEDICINE | Admitting: INTERNAL MEDICINE
Payer: MEDICARE

## 2022-10-19 ENCOUNTER — ANESTHESIA (OUTPATIENT)
Dept: CARDIAC CATH/INVASIVE PROCEDURES | Age: 72
End: 2022-10-19
Payer: MEDICARE

## 2022-10-19 ENCOUNTER — ANESTHESIA EVENT (OUTPATIENT)
Dept: CARDIAC CATH/INVASIVE PROCEDURES | Age: 72
End: 2022-10-19
Payer: MEDICARE

## 2022-10-19 VITALS
SYSTOLIC BLOOD PRESSURE: 136 MMHG | BODY MASS INDEX: 32.57 KG/M2 | OXYGEN SATURATION: 100 % | WEIGHT: 219.9 LBS | TEMPERATURE: 96.8 F | RESPIRATION RATE: 22 BRPM | DIASTOLIC BLOOD PRESSURE: 85 MMHG | HEIGHT: 69 IN | HEART RATE: 59 BPM

## 2022-10-19 DIAGNOSIS — I48.91 ATRIAL FIBRILLATION, UNSPECIFIED TYPE (HCC): ICD-10-CM

## 2022-10-19 LAB
ATRIAL RATE: 61 BPM
CALCULATED P AXIS, ECG09: 60 DEGREES
CALCULATED R AXIS, ECG10: -66 DEGREES
CALCULATED T AXIS, ECG11: 58 DEGREES
DIAGNOSIS, 93000: NORMAL
P-R INTERVAL, ECG05: 224 MS
Q-T INTERVAL, ECG07: 418 MS
QRS DURATION, ECG06: 98 MS
QTC CALCULATION (BEZET), ECG08: 420 MS
VENTRICULAR RATE, ECG03: 61 BPM

## 2022-10-19 PROCEDURE — 00410 ANES INTEG SYS CONV ARRHYT: CPT | Performed by: ANESTHESIOLOGY

## 2022-10-19 PROCEDURE — 99100 ANES PT EXTEME AGE<1 YR&>70: CPT | Performed by: ANESTHESIOLOGY

## 2022-10-19 PROCEDURE — 92960 CARDIOVERSION ELECTRIC EXT: CPT | Performed by: INTERNAL MEDICINE

## 2022-10-19 PROCEDURE — 76060000031 HC ANESTHESIA FIRST 0.5 HR: Performed by: INTERNAL MEDICINE

## 2022-10-19 PROCEDURE — 93005 ELECTROCARDIOGRAM TRACING: CPT

## 2022-10-19 PROCEDURE — 74011250636 HC RX REV CODE- 250/636: Performed by: ANESTHESIOLOGY

## 2022-10-19 RX ORDER — PROPOFOL 10 MG/ML
INJECTION, EMULSION INTRAVENOUS AS NEEDED
Status: DISCONTINUED | OUTPATIENT
Start: 2022-10-19 | End: 2022-10-19 | Stop reason: HOSPADM

## 2022-10-19 RX ADMIN — PROPOFOL 50 MG: 10 INJECTION, EMULSION INTRAVENOUS at 12:32

## 2022-10-19 NOTE — PROCEDURES
Indication:   Symptomatic a.fib    Procedure:  - External cardioversion under moderate sedation    Detail:  Informed consent obtained. Complication assocaited with possible cardioversion discussed in detail including but not limited to emergent thoracic surgery, pacemaker need, cardiac arrest and stroke. .. Under moderate sedation external cardioversion  External cardioversion performed using pads using 300 J of biphasic synchronized energy.   Successfully converted back to NSR from A.fib    Complication:  No immediate complication

## 2022-10-19 NOTE — ANESTHESIA POSTPROCEDURE EVALUATION
Procedure(s):  EP CARDIOVERSION.     MAC    Anesthesia Post Evaluation      Multimodal analgesia: multimodal analgesia used between 6 hours prior to anesthesia start to PACU discharge  Patient location during evaluation: bedside  Patient participation: complete - patient participated  Level of consciousness: awake and alert  Pain management: adequate  Airway patency: patent  Anesthetic complications: no  Cardiovascular status: stable  Respiratory status: acceptable  Hydration status: acceptable  Post anesthesia nausea and vomiting:  controlled  Final Post Anesthesia Temperature Assessment:  Normothermia (36.0-37.5 degrees C)      INITIAL Post-op Vital signs:   Vitals Value Taken Time   /88 10/19/22 1237   Temp 36 °C (96.8 °F) 10/19/22 1237   Pulse 63 10/19/22 1237   Resp 18 10/19/22 1237   SpO2 98 % 10/19/22 1237

## 2022-10-19 NOTE — DISCHARGE INSTRUCTIONS
Discharge Instructions for Cardioversion    Your healthcare provider performed a procedure called cardioversion. Your healthcare provider used a controlled electric shock or a medicine to briefly stop all electrical activity in your heart. This helped restore your hearts normal rhythm. Here are some instructions to follow while you recover. Home care  Because cardioversion typically requires sedation, you won't be able to drive home. You will need a ride. Wait at least 24 hours before driving a car or operating heavy machinery after receiving sedating medicines. Dont be alarmed if the skin on your chest is irritated or feels like it is sunburned. Your healthcare provider may prescribe a soothing lotion to relieve this discomfort. These minor symptoms will go away in a few days. Ask your healthcare provider about medicines to keep your heart rhythm steady. If you were prescribed medicine, take it as instructed by your healthcare provider. Dont skip doses or take double doses. Cardioversion requires blood thinners for at least 4 weeks to prevent a delayed risk of stroke when treating atrial fibrillation or atrial flutter. Be sure you discuss which medicine you are taking to prevent stroke. Ask when you need to have your medicine levels checked, and whether you may be able to stop taking it in the future or whether it is recommended that you take it for life. Some of these blood-thinning medicines will have the dose adjusted and interact with other medicines or foods. Your healthcare team will give you full instructions on what to watch out for. Report bleeding or symptoms of stroke immediately to your healthcare team and seek emergency medical attention. Learn to take your own pulse. Keep a record of your results. Ask your healthcare provider when you should seek emergency medical attention. He or she will tell you which pulse rate reading is dangerous.      Keep in mind this procedure may need to be repeated if the abnormal heart rhythm returns. After the procedure, your healthcare provider will tell you if the treatment worked or if you will need further treatments or medication. Follow-up Care  Make a follow-up appointment, or as directed. Call 911  Call 911 right away if you have:    Chest pain    Shortness of breath    Loss of vision, speech, or strength or coordination in any body part    When to call your healthcare provider  Call your healthcare provider right away if you:    Feel faint, dizzy, or lightheaded    Have chest pain with increased activity    Have irregular heartbeat or fast pulse    Have bleeding issues from blood-thinning medicines. DISCHARGE SUMMARY from Nurse    PATIENT INSTRUCTIONS:    After general anesthesia or intravenous sedation, for 24 hours or while taking prescription Narcotics:  Limit your activities  Do not drive and operate hazardous machinery  Do not make important personal or business decisions  Do  not drink alcoholic beverages  If you have not urinated within 8 hours after discharge, please contact your surgeon on call. Report the following to your surgeon:  Excessive pain, swelling, redness or odor of or around the surgical area  Temperature over 100.5  Nausea and vomiting lasting longer than 4 hours or if unable to take medications  Any signs of decreased circulation or nerve impairment to extremity: change in color, persistent  numbness, tingling, coldness or increase pain  Any questions    What to do at Home:  Recommended activity: Activity as tolerated and no driving for today. *  Please give a list of your current medications to your Primary Care Provider. *  Please update this list whenever your medications are discontinued, doses are changed, or new medications (including over-the-counter products) are added. *  Please carry medication information at all times in case of emergency situations.     These are general instructions for a healthy lifestyle:    No smoking/ No tobacco products/ Avoid exposure to second hand smoke  Surgeon General's Warning:  Quitting smoking now greatly reduces serious risk to your health. Obesity, smoking, and sedentary lifestyle greatly increases your risk for illness    A healthy diet, regular physical exercise & weight monitoring are important for maintaining a healthy lifestyle    You may be retaining fluid if you have a history of heart failure or if you experience any of the following symptoms:  Weight gain of 3 pounds or more overnight or 5 pounds in a week, increased swelling in our hands or feet or shortness of breath while lying flat in bed. Please call your doctor as soon as you notice any of these symptoms; do not wait until your next office visit. The discharge information has been reviewed with the patient. The patient verbalized understanding. Discharge medications reviewed with the patient and appropriate educational materials and side effects teaching were provided.   ___________________________________________________________________________________________________________________________________

## 2022-10-19 NOTE — PROGRESS NOTES
Cath holding summary     Patient escorted to cath holding from waiting area ambulatory, alert and oriented x 4, voicing no complaints. Changed into gown and placed on monitor. NPO since MN. Lab results, med rec and H&P reviewed on chart. PIV x 2 inserted without difficulty. Family to bedside. 5  AVS Discharge instructions reviewed with patient and copy given to patient. All questions answered. Patient verbalized understanding to all discharge instructions. PIV removed. No hematoma or bleeding noted from PIV site. No pain noted at discharge. Patient discharged with support person in stable condition. Escorted out to vehicle for transport home.

## 2022-10-19 NOTE — ANESTHESIA PREPROCEDURE EVALUATION
Relevant Problems   RESPIRATORY SYSTEM   (+) Obstructive sleep apnea      CARDIOVASCULAR   (+) Essential hypertension      ENDOCRINE   (+) Type 2 diabetes mellitus without complication, without long-term current use of insulin (HCC)       Anesthetic History   No history of anesthetic complications            Review of Systems / Medical History  Patient summary reviewed and pertinent labs reviewed    Pulmonary        Sleep apnea           Neuro/Psych             Comments: H/o SDH Cardiovascular    Hypertension  Valvular problems/murmurs: tricuspid insufficiency and mitral insufficiency      Dysrhythmias : atrial fibrillation  Hyperlipidemia    Exercise tolerance: <4 METS     GI/Hepatic/Renal           Liver disease     Endo/Other    Diabetes: type 2    Obesity and arthritis     Other Findings              Physical Exam    Airway  Mallampati: II  TM Distance: > 6 cm  Neck ROM: normal range of motion   Mouth opening: Normal     Cardiovascular          Murmur: Grade 1, Mitral area and Tricuspid area     Dental    Dentition: Poor dentition     Pulmonary  Breath sounds clear to auscultation               Abdominal  GI exam deferred       Other Findings            Anesthetic Plan    ASA: 4  Anesthesia type: MAC          Induction: Intravenous  Anesthetic plan and risks discussed with: Patient

## 2022-10-20 NOTE — H&P
Please see clinic note  for detail. Symptomatic a.fib  I saw and examined patient and confirmed above. No interval change. Labs reviewed. Procedure explained to patient and all risk and benefit discussed with patient. DW patient about rate control strategy vs rhythm control strategy. Risk , benefit and alternatives and complication of both discussed  Complication assocaited with STEVEN and possible cardioversion discussed in detail including but not limited to emergent thoracic surgery, pacemaker need, cardiac arrest and stroke. .. Patient agreeable for  cardioversion. History and physical has been reviewed.  There have been no significant clinical changes since the completion of the originally dated History and Physical.  Will be using moderate sedation.    ------------------------------------------------------------------------------------------------------------------

## 2022-11-18 DIAGNOSIS — Z01.818 PREOP TESTING: ICD-10-CM

## 2022-11-22 ENCOUNTER — OFFICE VISIT (OUTPATIENT)
Dept: CARDIOLOGY CLINIC | Age: 72
End: 2022-11-22
Payer: MEDICARE

## 2022-11-22 VITALS
HEIGHT: 69 IN | OXYGEN SATURATION: 98 % | TEMPERATURE: 97.9 F | SYSTOLIC BLOOD PRESSURE: 128 MMHG | HEART RATE: 56 BPM | DIASTOLIC BLOOD PRESSURE: 82 MMHG | BODY MASS INDEX: 33.18 KG/M2 | WEIGHT: 224 LBS

## 2022-11-22 DIAGNOSIS — E78.00 HYPERCHOLESTEREMIA: ICD-10-CM

## 2022-11-22 DIAGNOSIS — I48.91 ATRIAL FIBRILLATION, UNSPECIFIED TYPE (HCC): Primary | ICD-10-CM

## 2022-11-22 DIAGNOSIS — I10 ESSENTIAL HYPERTENSION: ICD-10-CM

## 2022-11-22 PROCEDURE — 3074F SYST BP LT 130 MM HG: CPT | Performed by: INTERNAL MEDICINE

## 2022-11-22 PROCEDURE — 1123F ACP DISCUSS/DSCN MKR DOCD: CPT | Performed by: INTERNAL MEDICINE

## 2022-11-22 PROCEDURE — G8752 SYS BP LESS 140: HCPCS | Performed by: INTERNAL MEDICINE

## 2022-11-22 PROCEDURE — G8510 SCR DEP NEG, NO PLAN REQD: HCPCS | Performed by: INTERNAL MEDICINE

## 2022-11-22 PROCEDURE — 3017F COLORECTAL CA SCREEN DOC REV: CPT | Performed by: INTERNAL MEDICINE

## 2022-11-22 PROCEDURE — G8417 CALC BMI ABV UP PARAM F/U: HCPCS | Performed by: INTERNAL MEDICINE

## 2022-11-22 PROCEDURE — G8428 CUR MEDS NOT DOCUMENT: HCPCS | Performed by: INTERNAL MEDICINE

## 2022-11-22 PROCEDURE — 93000 ELECTROCARDIOGRAM COMPLETE: CPT | Performed by: INTERNAL MEDICINE

## 2022-11-22 PROCEDURE — 1101F PT FALLS ASSESS-DOCD LE1/YR: CPT | Performed by: INTERNAL MEDICINE

## 2022-11-22 PROCEDURE — 3078F DIAST BP <80 MM HG: CPT | Performed by: INTERNAL MEDICINE

## 2022-11-22 PROCEDURE — G8536 NO DOC ELDER MAL SCRN: HCPCS | Performed by: INTERNAL MEDICINE

## 2022-11-22 PROCEDURE — G8754 DIAS BP LESS 90: HCPCS | Performed by: INTERNAL MEDICINE

## 2022-11-22 PROCEDURE — 99214 OFFICE O/P EST MOD 30 MIN: CPT | Performed by: INTERNAL MEDICINE

## 2022-11-22 RX ORDER — METOPROLOL TARTRATE 25 MG/1
25 TABLET, FILM COATED ORAL 2 TIMES DAILY
Qty: 180 TABLET | Refills: 3 | Status: SHIPPED | OUTPATIENT
Start: 2022-11-22

## 2022-11-22 RX ORDER — ATORVASTATIN CALCIUM 10 MG/1
10 TABLET, FILM COATED ORAL DAILY
Qty: 90 TABLET | Refills: 3 | Status: SHIPPED | OUTPATIENT
Start: 2022-11-22

## 2022-11-22 RX ORDER — LOSARTAN POTASSIUM 50 MG/1
75 TABLET ORAL DAILY
Qty: 90 TABLET | Refills: 3 | Status: SHIPPED | OUTPATIENT
Start: 2022-11-22

## 2022-11-22 NOTE — PATIENT INSTRUCTIONS
New Medication/Medication Changes  Lopressor 25 mg twice a day    **please allow 24-48 hrs for medication to be escribed to pharmacy** If you need any refills on medications please contact your pharmacy so that the request can be escribed to the provider for review.         New address  [place] [address], [facility], [place] [address]

## 2022-11-22 NOTE — PROGRESS NOTES
Identified pt with two pt identifiers(name and ). Reviewed record in preparation for visit and have obtained necessary documentation. Viry Prince presents today for   Chief Complaint   Patient presents with    Follow-up     S/p cardioversion       Pt c/o occ lightheadedness. Viry Prince preferred language for health care discussion is english/other. Personal Protective Equipment:   Personal Protective Equipment was used including: mask-surgical and hands-gloves. Patient was placed on no precaution(s). Patient was masked. Precautions:   Patient currently on None  Patient currently roomed with door closed. Is someone accompanying this pt? No     Is the patient using any DME equipment during OV? no    Depression Screening:  3 most recent PHQ Screens 2022   Little interest or pleasure in doing things Not at all   Feeling down, depressed, irritable, or hopeless Not at all   Total Score PHQ 2 0       Learning Assessment:  Learning Assessment 2015   PRIMARY LEARNER Patient   HIGHEST LEVEL OF EDUCATION - PRIMARY LEARNER  SOME COLLEGE   BARRIERS PRIMARY LEARNER NONE   CO-LEARNER CAREGIVER No   PRIMARY LANGUAGE ENGLISH    NEED No   LEARNER PREFERENCE PRIMARY READING   LEARNING SPECIAL TOPICS no   ANSWERED BY self   RELATIONSHIP SELF       Abuse Screening:  Abuse Screening Questionnaire 2020   Do you ever feel afraid of your partner? N   Are you in a relationship with someone who physically or mentally threatens you? N   Is it safe for you to go home? Y       Fall Risk  Fall Risk Assessment, last 12 mths 2022   Able to walk? Yes   Fall in past 12 months? 0   Do you feel unsteady? 0   Are you worried about falling 0       Pt currently taking Anticoagulant therapy? yes  Pt currently taking Antiplatelet therapy? No     Coordination of Care:  1. Have you been to the ER, urgent care clinic since your last visit? Hospitalized since your last visit? No     2. Have you seen or consulted any other health care providers outside of the [address] [place] [address] since your last visit? Include any pap smears or colon screening. No       Please see Red banners under Allergies and Med Rec to remove outside inquires. All correct information has been verified with patient and added to chart.      Medication's patient's would liked removed has been marked not taking to be removed per Verbal order and read back per Adelaide Hester MD

## 2022-11-22 NOTE — LETTER
11/22/2022    Patient: Darwin Oviedo   YOB: 1950   Date of Visit: 11/22/2022     Ben Mckeon MD  [facility] [address]  Via Fax: [phone]    Dear Ben Mckeon MD,      Thank you for referring Mr. August Wilson to [facility] for evaluation. My notes for this consultation are attached. If you have questions, please do not hesitate to call me. I look forward to following your patient along with you.       Sincerely,    Gretchen Mccloud MD

## 2022-11-22 NOTE — PROGRESS NOTES
[facility]    Mr. Alondra Merino is a 71 y.o. male with a history of diabetes, hypertension, hyperlipidemia and DJD, sleep apnea    Patient is here for cardiac evaluation. He denies any prior history of MI or CHF. Patient has known history of atrial fibrillation. Because of symptomatic A. fib, he underwent STEVEN guided cardioversion in 10/2021 with return to sinus rhythm. Unfortunately patient went back in atrial fibrillation, symptomatically so amiodarone was started and eventually underwent cardioversion again in 10/2022 successfully to sinus rhythm. He denies symptoms concerning for angina. He feels that he has been feeling more fatigue and tired lately. He is not able to keep himself up. He has mild dyspnea on moderate exertion. He feels like he may be back in A. fib and this may be related to it. He felt much better after cardioversion and restoring sinus rhythm. Patient is occasionally having some dizziness but no presyncope or syncope. Able to perform activity of daily living.   Overall feeling better    Past Medical History:   Diagnosis Date    A-fib [facility])     Arthritis     Degenerative disc disease, lumbar     Diabetes ([facility] [address]     HLD (hyperlipidemia)     Hypertension     Intracranial hemorrhage, cerebellar ([facility] [address] 2018    Hypertensive crisis, non-traumatic    Liver disease     Obstructive sleep apnea 02/20/2018    mild AHI 7 REM AHI 14  min on 2/21/2018 on CPAP    PPD positive, treated 2018    was treated    Sleep apnea     on cpap    Spondylosis of lumbar spine          Past Surgical History:   Procedure Laterality Date    COLONOSCOPY  10/13/2015 Return 10/14/2025    Dr. Aravind Baez; screening    HX COLONOSCOPY      HX KNEE ARTHROSCOPY Right 1986    right knee    HX KNEE REPLACEMENT Left 01/2020    HX OTHER SURGICAL      cyst removed from chin    HX WRIST FRACTURE TX Left     ORIF       Current Outpatient Medications Medication Sig    amiodarone (CORDARONE) 200 mg tablet Take 1 Tablet by mouth daily. losartan (COZAAR) 50 mg tablet Take 1 Tablet by mouth daily. (Patient taking differently: Take 37.5 mg by mouth daily.)    metoprolol tartrate (LOPRESSOR) 25 mg tablet Take 1.5 Tablets by mouth two (2) times a day. (Patient taking differently: Take 25 mg by mouth two (2) times a day.)    apixaban (ELIQUIS) 5 mg tablet Take 1 Tab by mouth two (2) times a day. atorvastatin (LIPITOR) 10 mg tablet Take 1 Tab by mouth daily. terbinafine HCL (LAMISIL) 1 % topical cream  (Patient not taking: Reported on 10/19/2022)    clotrimazole-betamethasone (LOTRISONE) topical cream APPLY TOPICALLY TO THE AFFECTED AREA TWICE DAILY FOR 14 DAYS (Patient not taking: Reported on 10/19/2022)    ciclopirox (PENLAC) 8 % solution  (Patient not taking: Reported on 10/19/2022)    cephALEXin (KEFLEX) 500 mg capsule Take 1 Capsule by mouth four (4) times daily. (Patient not taking: Reported on 10/19/2022)    tamsulosin (FLOMAX) 0.4 mg capsule Take 1 Capsule by mouth two (2) times a day. HYDROcodone-acetaminophen (NORCO) 5-325 mg per tablet Take  by mouth two (2) times daily as needed. cetirizine (ZYRTEC) 10 mg tablet Take 1 Tab by mouth daily. metFORMIN ER (GLUCOPHAGE XR) 500 mg tablet Take 2 tabs po daily with a meal (Patient taking differently: two (2) times a day.)    SITagliptin (JANUVIA) 50 mg tablet Take 1 Tab by mouth daily. sildenafil citrate (VIAGRA) 100 mg tablet Take 1 Tab by mouth as needed (E.D.). Once daily (Patient not taking: Reported on 10/19/2022)     No current facility-administered medications for this visit.        Allergies and Sensitivities:  No Known Allergies    Family History:  Family History   Problem Relation Age of Onset    No Known Problems Mother     No Known Problems Father        Social History:  Social History     Tobacco Use    Smoking status: Never    Smokeless tobacco: Never   Substance Use Topics Alcohol use: Yes     Alcohol/week: 1.7 standard drinks     Types: 2 Cans of beer per week    Drug use: No     He  reports that he has never smoked. He has never used smokeless tobacco.  He  reports current alcohol use of about 1.7 standard drinks per week. Review of Systems:  Cardiac symptoms as noted above in HPI. All others negative. Physical Exam:  BP Readings from Last 3 Encounters:   11/22/22 128/82   10/19/22 136/85   09/27/22 102/80         Pulse Readings from Last 3 Encounters:   11/22/22 (!) 56   10/19/22 (!) 59   09/27/22 69          Wt Readings from Last 3 Encounters:   11/22/22 101.6 kg (224 lb)   10/19/22 99.7 kg (219 lb 14.4 oz)   09/27/22 98.2 kg (216 lb 6.4 oz)       Objective:     Constitutional: Oriented to person, place, and time. HENT: Head: Normocephalic and atraumatic. Neck: No JVD present. Cardiovascular: Regular rhythm. No murmur, gallop or rubs appreciated  Lung: Breath sounds normal. No respiratory distress. No ronchi or rales appreciated  Abdominal: No tenderness. No rebound and no guarding. Musculoskeletal: There is no lower extremity edema. No cynosis    Review of Data  LABS:   Lab Results   Component Value Date/Time    Sodium 140 10/12/2022 07:12 AM    Potassium 3.9 10/12/2022 07:12 AM    Chloride 99 10/12/2022 07:12 AM    CO2 27 10/12/2022 07:12 AM    Glucose 121 (H) 10/12/2022 07:12 AM    BUN 14 10/12/2022 07:12 AM    Creatinine 0.9 10/12/2022 07:12 AM     Lipids Latest Ref Rng & Units 6/25/2019   Chol, Total <200 MG/   HDL 40 - 60 MG/DL 48   LDL 0 - 100 MG/DL 44.4   Trig <150 MG/   Chol/HDL Ratio 0 - 5.0   2.4   Some recent data might be hidden     Lab Results   Component Value Date/Time    ALT (SGPT) 52 05/11/2021 11:29 AM     Lab Results   Component Value Date/Time    Hemoglobin A1c 6.2 (H) 10/23/2020 01:31 PM    Hemoglobin A1c (POC) 5.8 08/11/2015 08:45 AM       EKG  Sinus rhythm. Incomplete right bundle branch block.   No ST changes of ischemia  11/22/2022: Sinus bradycardia at 56 bpm.  No ST changes of ischemia. Normal MI and QRS interval    ECHO (12/2020)  Left Ventricle Normal cavity size and wall thickness. Wall motion: normal. The estimated EF is 50 - 55%. Visually measured ejection fraction. Low normal systolic function. Atrial fibrillation observed. Wall Scoring The left ventricular wall motion is normal.            Left Atrium Moderately dilated left atrium. Left Atrium volume index is 44.03 mL/m2. Right Ventricle Normal global systolic function. Dilated right ventricle. Right Atrium Dilated right atrium. Aortic Valve Trileaflet valve structure and no stenosis. Mild aortic valve regurgitation. Mitral Valve No stenosis. Mitral valve non-specific thickening. Mild regurgitation. Tricuspid Valve Normal valve structure and no stenosis. Mild regurgitation. Pulmonic Valve Normal valve structure, no stenosis and no regurgitation. Aorta Normal aortic root and ascending aorta. Pulmonary Artery Pulmonary arterial systolic pressure (PASP) is 35 mmHg. Pulmonary hypertension not suggested by Doppler findings. STRESS TEST (01/20)  Baseline ECG: Sinus rhythm. Gated SPECT: Left ventricular function post-stress was normal. Calculated ejection fraction is 68%. Myocardial perfusion imaging supports a low risk stress test.  Technically difficult study. Inferior perfusion defect is most consistent with diaphragmatic attenuation artifact. There was no convincing evidence of significant reversible defect to suggest ongoing major ischemia. IMPRESSION & PLAN:  Mr. Concepcion Gomez is 71 y.o. male with multiple medical problem    Hypertension: /82.   Blood pressure stable and continue current medication     Atrial fibrillation:  Confirmed by EKG in August 2020  Status post STEVEN guided cardioversion in 11/2021 with return to normal rhythm  amiodarone started in 09/2022 and then underwent cardioversion again successfully to sinus rhythm in 10/2022  EKG today showed sinus bradycardia. Will reduce metoprolol dose to 25 mg twice daily. Continue amiodarone. Continue Eliquis for anticoagulation for stroke prophylaxis. Hyperlipidemia: Currently on atorvastatin. Last LDL 44. BCP checks this regularly. Diabetes: Goal hemoglobin A1c less than 7 is recommended. Defer to PCP. Thank you for allowing me to participate in patient care. Please feel free to call me if you have any question or concern. Marian Adame MD  Please note: This document has been produced using voice recognition software. Unrecognized errors in transcription may be present.

## 2022-12-08 NOTE — ANESTHESIA PROCEDURE NOTES
Peripheral Block    Start time: 10/28/2020 7:03 AM  End time: 10/28/2020 7:11 AM  Performed by: Steven Love MD  Authorized by: Steven Love MD       Pre-procedure: Indications: at surgeon's request, post-op pain management and procedure for pain    Preanesthetic Checklist: patient identified, risks and benefits discussed, site marked, timeout performed, anesthesia consent given and patient being monitored    Timeout Time: 07:03          Block Type:   Block Type: Adductor canal  Laterality:  Right  Monitoring:  Standard ASA monitoring, continuous pulse ox, frequent vital sign checks, oxygen, heart rate and responsive to questions  Injection Technique:  Single shot  Procedures: ultrasound guided and nerve stimulator    Patient Position: supine  Prep: chlorhexidine    Location:  Lower thigh  Needle Type:  Stimuplex  Needle Gauge:  21 G  Needle Localization:  Ultrasound guidance and nerve stimulator  Medication Injected:  Lidocaine (XYLOCAINE) Preserv-Free 2% injection, 1 mL  ropivacaine (NAROPIN) 2 mg/mL (0.2 %) injection, 40 mg  Med Admin Time: 10/28/2020 7:10 AM    Assessment:  Number of attempts:  1  Injection Assessment:  No paresthesia, incremental injection every 5 mL, ultrasound image on chart, no intravascular symptoms, negative aspiration for blood and local visualized surrounding nerve on ultrasound  Patient tolerance:  Patient tolerated the procedure well with no immediate complications  Location:  PREOP HOLDING    Patient given 2 mg IV Versed and 100 mcg IV Fentanyl for sedation.     10/28/2020     7:12 AM     Josef Mendez MD TRANSFER - OUT REPORT:    Verbal report given to [facility], RN(name) on Conductor.  being transferred to (unit) for routine progression of care       Report consisted of patients Situation, Background, Assessment and   Recommendations(SBAR). Information from the following report(s) SBAR, Kardex, ED Summary, Procedure Summary, MAR, Recent Results, and Cardiac Rhythm NSR  was reviewed with the receiving nurse. Lines:   Peripheral IV 12/07/22 Anterior;Distal;Right Forearm (Active)   Site Assessment Clean, dry, & intact 12/08/22 0339   Phlebitis Assessment 0 12/08/22 0339   Infiltration Assessment 0 12/08/22 0339   Dressing Status Clean, dry, & intact 12/08/22 0339   Dressing Type Transparent 12/08/22 0339   Hub Color/Line Status Pink;Capped 12/08/22 0339   Action Taken Open ports on tubing capped 12/08/22 0339   Alcohol Cap Used Yes 12/08/22 0339       Peripheral IV 12/08/22 Left;Posterior Hand (Active)   Site Assessment Clean, dry, & intact 12/08/22 0339   Phlebitis Assessment 0 12/08/22 0339   Infiltration Assessment 0 12/08/22 0339   Dressing Status Clean, dry, & intact 12/08/22 0339   Dressing Type Transparent 12/08/22 0339   Hub Color/Line Status Pink;Flushed; Infusing 12/08/22 9932   Action Taken Blood drawn;Open ports on tubing capped 12/08/22 0339   Alcohol Cap Used Yes 12/08/22 3457        Opportunity for questions and clarification was provided.       Patient transported with:   [employer]

## 2023-08-22 ENCOUNTER — OFFICE VISIT (OUTPATIENT)
Age: 73
End: 2023-08-22

## 2023-08-22 VITALS
BODY MASS INDEX: 31.82 KG/M2 | HEART RATE: 57 BPM | WEIGHT: 223 LBS | OXYGEN SATURATION: 96 % | DIASTOLIC BLOOD PRESSURE: 60 MMHG | SYSTOLIC BLOOD PRESSURE: 121 MMHG

## 2023-08-22 DIAGNOSIS — I48.91 ATRIAL FIBRILLATION, UNSPECIFIED TYPE (HCC): Primary | ICD-10-CM

## 2023-08-22 NOTE — PROGRESS NOTES
[facility]    Shasta Hancock is 68 y.o. male with a history of diabetes, hypertension, hyperlipidemia and DJD, sleep apnea      Patient is here for cardiac evaluation. He denies any prior history of MI or CHF. Patient has known history of atrial fibrillation. Because of symptomatic A. fib, he underwent OLU guided cardioversion in 10/2021 with return to sinus rhythm. Unfortunately patient went back in atrial fibrillation, symptomatically so amiodarone was  started and eventually underwent cardioversion again in 10/2022 successfully to sinus rhythm. Here for follow-up appointment   He denies symptoms concerning for angina. Minimal dizziness at random episode. No presyncope or syncope. Mild fatigue. No significant dyspnea or edema. Past Medical History:   Diagnosis Date    A-fib [facility])     Arthritis     Degenerative disc disease, lumbar     Diabetes ([address])     HLD (hyperlipidemia)     Hypertension     Intracranial hemorrhage, cerebellar ([address]) 2018    Hypertensive crisis, non-traumatic    Liver disease     Obstructive sleep apnea 02/20/2018    mild AHI 7 REM AHI 14  min on 2/21/2018 on CPAP    PPD positive, treated 2018    was treated    Sleep apnea     on cpap    Spondylosis of lumbar spine        Review of Systems:  Cardiac symptoms as noted above in HPI. All others negative. Current Outpatient Medications   Medication Sig    amiodarone (CORDARONE) 200 MG tablet Take 200 mg by mouth daily    apixaban (ELIQUIS) 5 MG TABS tablet Take 5 mg by mouth 2 times daily    atorvastatin (LIPITOR) 10 MG tablet Take 10 mg by mouth daily    cetirizine (ZYRTEC) 10 MG tablet Take 10 mg by mouth daily    HYDROcodone-acetaminophen (NORCO) 5-325 MG per tablet Take by mouth 2 times daily as needed.     losartan (COZAAR) 50 MG tablet Take 75 mg by mouth daily    metoprolol tartrate (LOPRESSOR) 25 MG tablet Take 25 mg by mouth 2 times daily

## 2023-08-22 NOTE — PROGRESS NOTES
Identified pt with two pt identifiers(name and ). Reviewed record in preparation for visit and have obtained necessary documentation. Rae Dillon presents today for   Chief Complaint   Patient presents with    Follow-up       Pt c/o DIZZINESS, SOB, CHEST PAIN/ PRESSURE,  SWELLING. Rae Dillon preferred language for health care discussion is english/other. Personal Protective Equipment:   Personal Protective Equipment was used including: mask-surgical and hands-gloves. Patient was placed on no precaution(s). Patient was not masked. Precautions:   Patient currently on None  Patient currently roomed with door closed. Is someone accompanying this pt? no    Is the patient using any DME equipment during OV? no    Depression Screening:  PHQ-9 Questionaire 2022   Little interest or pleasure in doing things 0 0 0 0 0   Feeling down, depressed, or hopeless 0 0 0 0 0   PHQ-9 Total Score 0 0 0 0 0        Learning Assessment:  No question data found. Abuse Screening: AMB Abuse Screening 2023   Do you ever feel afraid of your partner? N   Are you in a relationship with someone who physically or mentally threatens you? N   Is it safe for you to go home? Y          Fall Risk  Fall Risk 2023   2 or more falls in past year? no   Fall with injury in past year? no         Pt currently taking Anticoagulant therapy? Eliquis 5 mg BID  Pt currently taking Antiplatelet therapy? no    Coordination of Care:  1. Have you been to the ER, urgent care clinic since your last visit? Hospitalized since your last visit? no    2. Have you seen or consulted any other health care providers outside of the [address] [place] [address] since your last visit? Include any pap smears or colon screening. DOD PCP      Please see Red banners under Allergies and Med Rec to remove outside inquires.  All correct information has been verified with patient and added

## 2023-08-22 NOTE — PATIENT INSTRUCTIONS
Bette-( 7 days) will be calling you to confirm your address to send your Heart Monitor. Please allow 7-10 business days for monitor to arrive at your home.   Customer Service for [address] [place] [address] Avenue:  [phone]

## 2023-08-25 ENCOUNTER — TELEPHONE (OUTPATIENT)
Age: 73
End: 2023-08-25

## 2023-08-25 NOTE — TELEPHONE ENCOUNTER
Attempted to contact pt at  number, no answer. Lvm for pt to return call to office at [phone]. Will continue to try to contact pt.

## 2023-08-25 NOTE — TELEPHONE ENCOUNTER
Patient has concerns about his biotel device making noises. Told him to call biotel but would also like to speak to a nurse as well.

## 2023-08-28 NOTE — TELEPHONE ENCOUNTER
Contacted pt at [facility] number. Two patient Identifiers confirmed. Issue resolved pt contacted [employer]. No other issues noted.

## 2024-05-23 ENCOUNTER — OFFICE VISIT (OUTPATIENT)
Age: 74
End: 2024-05-23
Payer: MEDICARE

## 2024-05-23 VITALS
DIASTOLIC BLOOD PRESSURE: 76 MMHG | SYSTOLIC BLOOD PRESSURE: 119 MMHG | WEIGHT: 205.4 LBS | HEART RATE: 62 BPM | HEIGHT: 70 IN | BODY MASS INDEX: 29.41 KG/M2 | OXYGEN SATURATION: 99 %

## 2024-05-23 DIAGNOSIS — I10 ESSENTIAL HYPERTENSION: Primary | ICD-10-CM

## 2024-05-23 DIAGNOSIS — I48.91 ATRIAL FIBRILLATION, UNSPECIFIED TYPE (HCC): ICD-10-CM

## 2024-05-23 PROCEDURE — 3017F COLORECTAL CA SCREEN DOC REV: CPT | Performed by: INTERNAL MEDICINE

## 2024-05-23 PROCEDURE — 99214 OFFICE O/P EST MOD 30 MIN: CPT | Performed by: INTERNAL MEDICINE

## 2024-05-23 PROCEDURE — G8417 CALC BMI ABV UP PARAM F/U: HCPCS | Performed by: INTERNAL MEDICINE

## 2024-05-23 PROCEDURE — 3078F DIAST BP <80 MM HG: CPT | Performed by: INTERNAL MEDICINE

## 2024-05-23 PROCEDURE — 1036F TOBACCO NON-USER: CPT | Performed by: INTERNAL MEDICINE

## 2024-05-23 PROCEDURE — 1123F ACP DISCUSS/DSCN MKR DOCD: CPT | Performed by: INTERNAL MEDICINE

## 2024-05-23 PROCEDURE — 93000 ELECTROCARDIOGRAM COMPLETE: CPT | Performed by: INTERNAL MEDICINE

## 2024-05-23 PROCEDURE — G8427 DOCREV CUR MEDS BY ELIG CLIN: HCPCS | Performed by: INTERNAL MEDICINE

## 2024-05-23 PROCEDURE — 3074F SYST BP LT 130 MM HG: CPT | Performed by: INTERNAL MEDICINE

## 2024-05-23 RX ORDER — MONTELUKAST SODIUM 10 MG/1
TABLET ORAL
COMMUNITY
Start: 2024-03-04

## 2024-05-23 RX ORDER — AMIODARONE HYDROCHLORIDE 200 MG/1
200 TABLET ORAL DAILY
Qty: 180 TABLET | Refills: 2 | Status: SHIPPED | OUTPATIENT
Start: 2024-05-23

## 2024-05-23 RX ORDER — TRIAMCINOLONE ACETONIDE 1 MG/G
CREAM TOPICAL
COMMUNITY
Start: 2024-04-21

## 2024-05-23 RX ORDER — KETOCONAZOLE 20 MG/ML
SHAMPOO TOPICAL
COMMUNITY
Start: 2024-04-15 | End: 2025-04-16

## 2024-05-23 RX ORDER — FINASTERIDE 5 MG/1
TABLET, FILM COATED ORAL
COMMUNITY
Start: 2024-04-24 | End: 2025-04-24

## 2024-05-23 RX ORDER — AMLODIPINE BESYLATE 5 MG/1
TABLET ORAL
COMMUNITY
Start: 2023-12-13 | End: 2025-03-15

## 2024-05-23 RX ORDER — SILDENAFIL 100 MG/1
TABLET, FILM COATED ORAL
COMMUNITY
Start: 2024-03-28

## 2024-05-23 NOTE — PROGRESS NOTES
Identified pt with two pt identifiers(name and ). Reviewed record in preparation for visit and have obtained necessary documentation.    Ruizyulyderian TOMMIE Dill presents today for   Chief Complaint   Patient presents with    Follow-up       Pt c/o , FATIGUE/WEAKNESS, SWELLING.             Frederick Dill preferred language for health care discussion is english/other.    Personal Protective Equipment:   Personal Protective Equipment was used including: mask-surgical and hands-gloves. Patient was placed on no precaution(s). Patient was masked.    Precautions:   Patient currently on None  Patient currently roomed with door closed.    Is someone accompanying this pt? wife    Is the patient using any DME equipment during OV? no    Depression Screenin/22/2022     9:04 AM 2022     8:53 AM 2021     1:59 PM 2021     1:02 PM 2021     3:26 PM   PHQ-9 Questionaire   Little interest or pleasure in doing things 0 0 0 0 0   Feeling down, depressed, or hopeless 0 0 0 0 0   PHQ-9 Total Score 0 0 0 0 0        Learning Assessment:  No question data found.    Abuse Screenin/22/2023    10:00 AM   AMB Abuse Screening   Do you ever feel afraid of your partner? N   Are you in a relationship with someone who physically or mentally threatens you? N   Is it safe for you to go home? Y          Fall Risk      2023    10:34 AM   Fall Risk   2 or more falls in past year? no   Fall with injury in past year? no         Pt currently taking Anticoagulant /Antiplatelet therapy? eliquiis    Coordination of Care:  1. Have you been to the ER, urgent care clinic since your last visit? Hospitalized since your last visit? no    2. Have you seen or consulted any other health care providers outside of the [facility] since your last visit? Include any pap smears or colon screening. no      Please see Red banners under Allergies and Med Rec to remove outside inquires. All correct information has 
128/82         Pulse Readings from Last 3 Encounters:   05/23/24 62   08/22/23 57   11/22/22 56          Wt Readings from Last 3 Encounters:   05/23/24 93.2 kg (205 lb 6.4 oz)   04/08/24 101.2 kg (223 lb)   12/11/23 101.2 kg (223 lb)       Constitutional: Oriented to person, place, and time.   HENT: Head: Normocephalic and atraumatic.   Neck: No JVD present. Carotid bruit is not appreciated.   Cardiovascular: Regular rhythm.   No murmur, gallop or rubs appreciated  Lung: Breath sounds normal. No respiratory distress. No ronchi or rales appreciated  Abdominal: No tenderness. No rebound and no guarding.   Musculoskeletal: There is no lower extremity edema. No cynosis    LABS:   @  Lab Results   Component Value Date/Time    WBC 9.0 10/23/2020 01:31 PM    HGB 13.2 10/28/2020 12:46 PM    HCT 38.6 10/28/2020 12:46 PM     10/23/2020 01:31 PM    MCV 85.9 10/23/2020 01:31 PM     Lab Results   Component Value Date/Time     10/12/2022 07:12 AM    K 3.9 10/12/2022 07:12 AM    CL 99 10/12/2022 07:12 AM    CO2 27 10/12/2022 07:12 AM    BUN 14 10/12/2022 07:12 AM    CREATININE 0.9 10/12/2022 07:12 AM    GLUCOSE 121 10/12/2022 07:12 AM    CALCIUM 9.0 10/12/2022 07:12 AM           Latest Ref Rng & Units 5/11/2021    11:29 AM 10/23/2020     1:31 PM 7/24/2020    12:19 PM 1/10/2020    10:17 AM 6/25/2019    12:08 PM   Lipids   Chol <200 MG/DL     113    HDL 40 - 60 MG/DL     48    LDL Calc 0 - 100 MG/DL     44.4    VLDL Calc MG/DL     20.6    Trig <150 MG/DL     103    Ratio 0 - 5.0     2.4    ALT 16 - 61 U/L 52  29  23  34  41    AST 10 - 38 U/L 15  18  12  16  20    LDL 0 - 100 MG/DL     44.4      Lab Results   Component Value Date/Time    ALT 52 05/11/2021 11:29 AM     Hemoglobin A1C   Date Value Ref Range Status   10/23/2020 6.2 (H) 4.2 - 5.6 % Final     Comment:     (NOTE)  HbA1C Interpretive Ranges  <5.7              Normal  5.7 - 6.4         Consider Prediabetes  >6.5              Consider Diabetes       No results

## 2024-05-23 NOTE — PATIENT INSTRUCTIONS
Testing     Echo ( limited)   PATIENT PREPS:   -Wear easy to remove shirt to your appointment for easier accessibility    **call office 3-5 days after testing is completed for results** Please ensure testing is completed prior to scheduled follow up appointment. If it is not completed your appointment may be rescheduled**

## 2024-06-11 DIAGNOSIS — E78.00 PURE HYPERCHOLESTEROLEMIA, UNSPECIFIED: ICD-10-CM

## 2024-06-11 DIAGNOSIS — I10 ESSENTIAL HYPERTENSION: Primary | ICD-10-CM

## 2024-06-11 NOTE — TELEPHONE ENCOUNTER
Incoming from patient. Two patient identifiers confirmed. Patient requesting medication refill.    PCP: Bhavik Griffiths MD  Last appt:  5/23/2024   Future Appointments   Date Time Provider Department Center   8/1/2024  1:30 PM I [facility] ECHO 1 [facility] BS AMB   10/7/2024  1:15 PM Mo Darnell MD [facility] Amy Sched   2/27/2025 11:30 AM Gina Corona PA-C [facility] BS AMB       Medication requested: amiodarone 200mg    Pharmacy: [place] [id number] [address] [facility]    Patient said pharmacy has not received any refills for prescription

## 2024-06-11 NOTE — TELEPHONE ENCOUNTER
PCP: Bhavik Griffiths MD    Last appt:  5/23/2024   Future Appointments   Date Time Provider Department Center   8/1/2024  1:30 PM I [facility] 1 [facility] BS AMB   10/7/2024  1:15 PM Mo Darnell MD [facility] Sched   2/27/2025 11:30 AM Gina Corona PA-C [facility] BS AMB       Requested Prescriptions     Pending Prescriptions Disp Refills    amiodarone (CORDARONE) 200 MG tablet 180 tablet 2     Sig: Take 1 tablet by mouth daily       Request for a 30 or 90 day supply? Provider Discretion    Pharmacy: confirm    Other Comments: n/a

## 2024-06-12 RX ORDER — AMIODARONE HYDROCHLORIDE 200 MG/1
200 TABLET ORAL DAILY
Qty: 180 TABLET | Refills: 3 | Status: SHIPPED | OUTPATIENT
Start: 2024-06-12

## 2024-12-09 DIAGNOSIS — I10 ESSENTIAL HYPERTENSION: ICD-10-CM

## 2024-12-09 DIAGNOSIS — E78.00 PURE HYPERCHOLESTEROLEMIA, UNSPECIFIED: ICD-10-CM

## 2024-12-09 NOTE — TELEPHONE ENCOUNTER
Patient called to report he is out of CORDARONE and is requesting a refill. I advised patient it was ordered in June and sent to the [facility]'s pharmacy but he states his pharmacy is the [facility] pharmacy and would like the order sent there. Patient also asking if we could call when order is sent to he can activate the order and arrange  with the pharmacy. Ph: # [phone].

## 2024-12-11 RX ORDER — AMIODARONE HYDROCHLORIDE 200 MG/1
200 TABLET ORAL DAILY
Qty: 180 TABLET | Refills: 3 | Status: SHIPPED | OUTPATIENT
Start: 2024-12-11

## 2024-12-11 NOTE — TELEPHONE ENCOUNTER
PCP: Bhavik Griffiths MD    Last appt:  5/23/2024   Future Appointments   Date Time Provider Department Center   2/27/2025 11:30 AM Gina Corona PA-C [facility] BS AMB   4/17/2025  3:00 PM Marge Diaz APRN - NP [facility] Amy Sched       Requested Prescriptions     Pending Prescriptions Disp Refills    amiodarone (CORDARONE) 200 MG tablet 180 tablet 3     Sig: Take 1 tablet by mouth daily       Request for a 30 or 90 day supply? Provider Discretion    Pharmacy: confirmed     Other Comments: Pt requested [facility] pharmacy.

## 2025-02-27 ENCOUNTER — OFFICE VISIT (OUTPATIENT)
Age: 75
End: 2025-02-27
Payer: MEDICARE

## 2025-02-27 VITALS
WEIGHT: 202 LBS | BODY MASS INDEX: 28.92 KG/M2 | DIASTOLIC BLOOD PRESSURE: 63 MMHG | HEIGHT: 70 IN | HEART RATE: 59 BPM | SYSTOLIC BLOOD PRESSURE: 104 MMHG | OXYGEN SATURATION: 95 %

## 2025-02-27 DIAGNOSIS — I48.0 PAF (PAROXYSMAL ATRIAL FIBRILLATION) (HCC): ICD-10-CM

## 2025-02-27 DIAGNOSIS — I10 ESSENTIAL HYPERTENSION WITH GOAL BLOOD PRESSURE LESS THAN 140/90: Primary | ICD-10-CM

## 2025-02-27 PROCEDURE — 1036F TOBACCO NON-USER: CPT | Performed by: INTERNAL MEDICINE

## 2025-02-27 PROCEDURE — G8417 CALC BMI ABV UP PARAM F/U: HCPCS | Performed by: INTERNAL MEDICINE

## 2025-02-27 PROCEDURE — 3078F DIAST BP <80 MM HG: CPT | Performed by: INTERNAL MEDICINE

## 2025-02-27 PROCEDURE — 3017F COLORECTAL CA SCREEN DOC REV: CPT | Performed by: INTERNAL MEDICINE

## 2025-02-27 PROCEDURE — G8428 CUR MEDS NOT DOCUMENT: HCPCS | Performed by: INTERNAL MEDICINE

## 2025-02-27 PROCEDURE — 1123F ACP DISCUSS/DSCN MKR DOCD: CPT | Performed by: INTERNAL MEDICINE

## 2025-02-27 PROCEDURE — 99214 OFFICE O/P EST MOD 30 MIN: CPT | Performed by: INTERNAL MEDICINE

## 2025-02-27 PROCEDURE — 3074F SYST BP LT 130 MM HG: CPT | Performed by: INTERNAL MEDICINE

## 2025-02-27 NOTE — PROGRESS NOTES
Identified pt with two pt identifiers(name and ). Reviewed record in preparation for visit and have obtained necessary documentation.    Frederick TOMMIE Aniyah presents today for   Chief Complaint   Patient presents with    Follow-up     9m post echo       Pt denies DIZZINESS, SOB, CHEST PAIN/ PRESSURE, FATIGUE/WEAKNESS, HEADACHES, SWELLING.             Frederick Dill preferred language for health care discussion is english/other.    Personal Protective Equipment:   Personal Protective Equipment was used including: mask-surgical and hands-gloves. Patient was placed on no precaution(s). Patient was not masked.    Precautions:   Patient currently on None  Patient currently roomed with door closed.    Is someone accompanying this pt? wife    Is the patient using any DME equipment during OV? no    Depression Screenin/27/2025    11:09 AM 2022     9:04 AM 2022     8:53 AM 2021     1:59 PM 2021     1:02 PM 2021     3:26 PM   PHQ-9 Questionaire   Little interest or pleasure in doing things 0 0 0 0 0 0   Feeling down, depressed, or hopeless 0 0 0 0 0 0   PHQ-9 Total Score 0 0 0 0 0 0        Learning Assessment:  Who is the primary learner? Patient    What is the preferred language for health care of the primary learner? ENGLISH    How does the primary learner prefer to learn new concepts? DEMONSTRATION    Answered By patient    Relationship to Learner SELF        Abuse Screenin/27/2025    11:00 AM 2023    10:00 AM   AMB Abuse Screening   Do you ever feel afraid of your partner? N N   Are you in a relationship with someone who physically or mentally threatens you? N N   Is it safe for you to go home? Y Y          Fall Risk      2025    11:10 AM 2023    10:34 AM   Fall Risk   Do you feel unsteady or are you worried about falling?  no no   2 or more falls in past year? no no   Fall with injury in past year? no no         Pt currently taking Anticoagulant

## 2025-02-27 NOTE — PROGRESS NOTES
[facility]    Frederick Dill is 74 y.o. male with a history of diabetes, hypertension, hyperlipidemia and DJD, sleep apnea      Patient is here for cardiac evaluation.  He denies any prior history of MI or CHF.   Patient has known history of atrial fibrillation.  Because of symptomatic A. fib, he underwent OLU guided cardioversion in 10/2021 with return to sinus rhythm.  Unfortunately patient went back in atrial fibrillation, symptomatically so amiodarone was  started and eventually underwent cardioversion again in 10/2022 successfully to sinus rhythm.     Here for follow-up appointment.  No hospital admission or ER visits since last time   He denies symptoms concerning for angina.  No palpitation no presyncope or syncope.   No significant dyspnea or edema.  No significant lower extremity swelling.    Past Medical History:   Diagnosis Date    A-fib (HCC)     Arthritis     Degenerative disc disease, lumbar     Diabetes (HCC)     HLD (hyperlipidemia)     Hypertension     Intracranial hemorrhage, cerebellar (HCC) 2018    Hypertensive crisis, non-traumatic    Liver disease     Neuropathy     Obstructive sleep apnea 02/20/2018    mild AHI 7 REM AHI 14  min on 2/21/2018 on CPAP    PPD positive, treated 2018    was treated    Sleep apnea     on cpap    Spondylosis of lumbar spine        Review of Systems:  Cardiac symptoms as noted above in HPI. All others negative.    Current Outpatient Medications   Medication Sig    amiodarone (CORDARONE) 200 MG tablet Take 1 tablet by mouth daily    amLODIPine (NORVASC) 5 MG tablet     apixaban (ELIQUIS) 5 MG TABS tablet Take 1 tablet by mouth 2 times daily    empagliflozin (JARDIANCE) 10 MG tablet Take 1 tablet by mouth daily    atorvastatin (LIPITOR) 10 MG tablet Take 1 tablet by mouth daily    losartan (COZAAR) 50 MG tablet Take 1.5 tablets by mouth daily    metoprolol tartrate (LOPRESSOR) 25 MG tablet

## 2025-04-02 NOTE — TELEPHONE ENCOUNTER
PCP: Bhavik Griffiths MD    Last appt:  2/27/2025   Future Appointments   Date Time Provider Department Center   4/17/2025  3:00 PM Marge Diaz APRN - NP [facility]   12/30/2025 11:30 AM Gina Corona PA-C [facility] BS AMB       Requested Prescriptions     Pending Prescriptions Disp Refills    apixaban (ELIQUIS) 5 MG TABS tablet 180 tablet 2     Sig: Take 1 tablet by mouth 2 times daily       Request for a 30 or 90 day supply? Provider Discretion    Pharmacy: confirmed    Other Comments: n/a

## 2025-04-04 NOTE — TELEPHONE ENCOUNTER
Patient is requesting a refill of Eliquis, to the pharmacy on file. Patient is asking to be called once the order has been sent [phone]

## 2025-05-27 NOTE — DISCHARGE INSTRUCTIONS
Discharge Instructions for Total Knee Replacement Patients    · The dressing on your knee will be changed by the Home Health professional at the appropriate time. Keep your incision clean and dry. Do not apply any ointments to the incision. · You may shower as long as you keep you incision dry. When showering, leave your dressing on. The dressing is waterproof as long as the edges are sealed. · Notify your surgeon if:  · Your temperature is greater than 100.5  · You have pain not controlled by your pain medication  · You have increased drainage from your incision  · You have increased redness or swelling in your leg  · You have chest pain, shortness of breath, or any other problems    · Do your exercises as instructed by the home physical therapist.    · Bend and straighten your operative leg every hour. Walk once an hour during normal walking hours. · During periods of inactivity or rest, your leg should be elevated with a rolled towel or folded pillow under the heel to keep the knee straight. · Do not place anything under the knee. · Do not sit in a recliner chair with the footrest elevated. · You may use ice to your knee for 20-30 minutes after exercise and as needed. Do not apply the ice pack directly to your skin. Use a barrier such as your pant leg or a thin towel. · If you have GUERRERO hose (the white support stockings), remove them at bedtime and re-apply the hose in the morning for the next 2 weeks. Take your blood thinner medication everyday at the same time. Do not skip a dose. Your pain medication can be taken next at 2000    You can take Tylenol for Pain. Do not take more than 3000 mg in 24 hours. Best of luck with your new knee and [address] YOU for choosing the [address]! normal sinus rhythm

## (undated) DEVICE — GARMENT,MEDLINE,DVT,SEQUENTIAL,CALF,MD: Brand: MEDLINE

## (undated) DEVICE — GUIDEPIN ORTH THRD HI PERF HD SIG

## (undated) DEVICE — INTENDED FOR TISSUE SEPARATION, AND OTHER PROCEDURES THAT REQUIRE A SHARP SURGICAL BLADE TO PUNCTURE OR CUT.: Brand: BARD-PARKER SAFETY BLADES SIZE 10, STERILE

## (undated) DEVICE — BLANKET WRM W29.9XL79.1IN UP BODY FORC AIR MISTRAL-AIR

## (undated) DEVICE — PACK SURG BSHR TOT KNEE LF

## (undated) DEVICE — BLADE RMFG DBL RECIP DBL SD --

## (undated) DEVICE — NEEDLE HYPO 18GA L1.5IN PNK S STL HUB POLYPR SHLD REG BVL

## (undated) DEVICE — HANDPIECE SET WITH HIGH FLOW TIP AND SUCTION TUBE: Brand: INTERPULSE

## (undated) DEVICE — TAPE,CLOTH/SILK,CURAD,3"X10YD,LF,40/CS: Brand: CURAD

## (undated) DEVICE — ZIMMER® STERILE DISPOSABLE TOURNIQUET CUFF WITH PROTECTIVE SLEEVE AND PLC, DUAL PORT, SINGLE BLADDER, 34 IN. (86 CM)

## (undated) DEVICE — 4-PORT MANIFOLD: Brand: NEPTUNE 2

## (undated) DEVICE — SUTURE VCRL SZ 0 L36IN ABSRB UD L36MM CT-1 1/2 CIR J946H

## (undated) DEVICE — REM POLYHESIVE ADULT PATIENT RETURN ELECTRODE: Brand: VALLEYLAB

## (undated) DEVICE — SUTURE MCRYL SZ 2-0 L36IN ABSRB UD L36MM CT-1 1/2 CIR Y945H

## (undated) DEVICE — SOLUTION IRRIG 3000ML 0.9% SOD CHL FLX CONT 0797208] ICU MEDICAL INC]

## (undated) DEVICE — PADDING CAST W6INXL4YD ST COT COHESIVE HND TEARABLE SPEC

## (undated) DEVICE — Device

## (undated) DEVICE — INTENDED FOR TISSUE SEPARATION, AND OTHER PROCEDURES THAT REQUIRE A SHARP SURGICAL BLADE TO PUNCTURE OR CUT.: Brand: BARD-PARKER SAFETY BLADES SIZE 15, STERILE

## (undated) DEVICE — SYR LR LCK 1ML GRAD NSAF 30ML --

## (undated) DEVICE — (D)PREP SKN CHLRAPRP APPL 26ML -- CONVERT TO ITEM 371833

## (undated) DEVICE — NEEDLE HYPO 21GA L1.5IN INTRAMUSCULAR S STL LATCH BVL UP

## (undated) DEVICE — WATERPROOF, BACTERIA PROOF DRESSING WITH ABSORBENT SEE THROUGH PAD: Brand: OPSITE POST-OP VISIBLE 30X10CM CTN 20

## (undated) DEVICE — Z DISCONTINUED BY MEDLINE USE 2711682 TRAY SKIN PREP DRY W/ PREM GLV

## (undated) DEVICE — BOWL AND CEMENT CARTRIDGE WITH BREAKAWAY FEMORAL NOZZLE: Brand: ACM

## (undated) DEVICE — ELASTIC BANDAGE 6": Brand: CARDINAL HEALTH

## (undated) DEVICE — BNDG ELAS HK LOOP 6X5YD NS -- MATRIX

## (undated) DEVICE — NEEDLE SPNL 22GA L3.5IN BLK HUB S STL REG WALL FIT STYL W/

## (undated) DEVICE — SOFT SILICONE HYDROCELLULAR SACRUM DRESSING WITH LOCK AWAY LAYER: Brand: ALLEVYN LIFE SACRUM (LARGE) PACK OF 10

## (undated) DEVICE — DRESSING FOAM DISK DIA1IN H 7MM HYDRPHLC CHG IMPREG IN SL

## (undated) DEVICE — SUTURE VCRL SZ 2 L27IN ABSRB VLT L65MM TP-1 1/2 CIR J649G

## (undated) DEVICE — HOOD, PEEL-AWAY: Brand: FLYTE

## (undated) DEVICE — STRYKER PERFORMANCE SERIES SAGITTAL BLADE: Brand: STRYKER PERFORMANCE SERIES

## (undated) DEVICE — 3M™ TEGADERM™ TRANSPARENT FILM DRESSING FRAME STYLE, 1626W, 4 IN X 4-3/4 IN (10 CM X 12 CM), 50/CT 4CT/CASE: Brand: 3M™ TEGADERM™

## (undated) DEVICE — KIT CLN UP BON SECOURS MARYV

## (undated) DEVICE — BLANKET WRM AD W50XL85.8IN PACU FULL BODY FORC AIR

## (undated) DEVICE — PREP SKN CHLRAPRP APL 26ML STR --

## (undated) DEVICE — JP 3-SPRING RES W/15FR PVC DRAIN/TR: Brand: CARDINAL HEALTH

## (undated) DEVICE — SPIROMETER INCENT 2500ML W ONE W VLV

## (undated) DEVICE — PIN DRL QUIK HI PERF FOR SIG SYS

## (undated) DEVICE — SOLUTION IV 1000ML 0.9% SOD CHL

## (undated) DEVICE — GARMENT COMPR M FOR 13IN FT INTMIT SGL BLDR HEM FORC II

## (undated) DEVICE — BAG ICE